# Patient Record
Sex: MALE | Race: WHITE | NOT HISPANIC OR LATINO | Employment: UNEMPLOYED | URBAN - METROPOLITAN AREA
[De-identification: names, ages, dates, MRNs, and addresses within clinical notes are randomized per-mention and may not be internally consistent; named-entity substitution may affect disease eponyms.]

---

## 2019-08-06 ENCOUNTER — HOSPITAL ENCOUNTER (EMERGENCY)
Facility: HOSPITAL | Age: 54
Discharge: HOME | End: 2019-08-07
Attending: EMERGENCY MEDICINE

## 2019-08-06 VITALS
HEIGHT: 72 IN | RESPIRATION RATE: 20 BRPM | TEMPERATURE: 99 F | WEIGHT: 210 LBS | OXYGEN SATURATION: 98 % | BODY MASS INDEX: 28.44 KG/M2 | HEART RATE: 84 BPM | DIASTOLIC BLOOD PRESSURE: 84 MMHG | SYSTOLIC BLOOD PRESSURE: 143 MMHG

## 2019-08-06 DIAGNOSIS — M54.50 LUMBAR BACK PAIN: Primary | ICD-10-CM

## 2019-08-06 DIAGNOSIS — R31.9 HEMATURIA, UNSPECIFIED TYPE: ICD-10-CM

## 2019-08-06 LAB
ALBUMIN SERPL BCP-MCNC: 3.9 G/DL (ref 3.5–5.2)
ALP SERPL-CCNC: 116 U/L (ref 55–135)
ALT SERPL W/O P-5'-P-CCNC: 68 U/L (ref 10–44)
ANION GAP SERPL CALC-SCNC: 9 MMOL/L (ref 8–16)
AST SERPL-CCNC: 34 U/L (ref 10–40)
BACTERIA #/AREA URNS HPF: NORMAL /HPF
BASOPHILS # BLD AUTO: 0.03 K/UL (ref 0–0.2)
BASOPHILS NFR BLD: 0.4 % (ref 0–1.9)
BILIRUB SERPL-MCNC: 0.3 MG/DL (ref 0.1–1)
BILIRUB UR QL STRIP: NEGATIVE
BUN SERPL-MCNC: 11 MG/DL (ref 6–20)
CALCIUM SERPL-MCNC: 9.5 MG/DL (ref 8.7–10.5)
CHLORIDE SERPL-SCNC: 100 MMOL/L (ref 95–110)
CLARITY UR: CLEAR
CO2 SERPL-SCNC: 30 MMOL/L (ref 23–29)
COLOR UR: YELLOW
CREAT SERPL-MCNC: 1.3 MG/DL (ref 0.5–1.4)
DIFFERENTIAL METHOD: ABNORMAL
EOSINOPHIL # BLD AUTO: 0.1 K/UL (ref 0–0.5)
EOSINOPHIL NFR BLD: 1.7 % (ref 0–8)
ERYTHROCYTE [DISTWIDTH] IN BLOOD BY AUTOMATED COUNT: 13 % (ref 11.5–14.5)
EST. GFR  (AFRICAN AMERICAN): >60 ML/MIN/1.73 M^2
EST. GFR  (NON AFRICAN AMERICAN): >60 ML/MIN/1.73 M^2
GLUCOSE SERPL-MCNC: 100 MG/DL (ref 70–110)
GLUCOSE UR QL STRIP: NEGATIVE
HCT VFR BLD AUTO: 45.9 % (ref 40–54)
HCV AB SERPL QL IA: NEGATIVE
HGB BLD-MCNC: 16.2 G/DL (ref 14–18)
HGB UR QL STRIP: ABNORMAL
HIV 1+2 AB+HIV1 P24 AG SERPL QL IA: NEGATIVE
KETONES UR QL STRIP: NEGATIVE
LEUKOCYTE ESTERASE UR QL STRIP: NEGATIVE
LYMPHOCYTES # BLD AUTO: 3.2 K/UL (ref 1–4.8)
LYMPHOCYTES NFR BLD: 39.2 % (ref 18–48)
MCH RBC QN AUTO: 30.6 PG (ref 27–31)
MCHC RBC AUTO-ENTMCNC: 35.3 G/DL (ref 32–36)
MCV RBC AUTO: 87 FL (ref 82–98)
MICROSCOPIC COMMENT: NORMAL
MONOCYTES # BLD AUTO: 0.6 K/UL (ref 0.3–1)
MONOCYTES NFR BLD: 7.7 % (ref 4–15)
NEUTROPHILS # BLD AUTO: 4.2 K/UL (ref 1.8–7.7)
NEUTROPHILS NFR BLD: 51 % (ref 38–73)
NITRITE UR QL STRIP: NEGATIVE
PH UR STRIP: 6 [PH] (ref 5–8)
PLATELET # BLD AUTO: 315 K/UL (ref 150–350)
PMV BLD AUTO: 8.9 FL (ref 9.2–12.9)
POTASSIUM SERPL-SCNC: 3.9 MMOL/L (ref 3.5–5.1)
PROT SERPL-MCNC: 7.4 G/DL (ref 6–8.4)
PROT UR QL STRIP: NEGATIVE
RBC # BLD AUTO: 5.3 M/UL (ref 4.6–6.2)
RBC #/AREA URNS HPF: 3 /HPF (ref 0–4)
SODIUM SERPL-SCNC: 139 MMOL/L (ref 136–145)
SP GR UR STRIP: 1.01 (ref 1–1.03)
URN SPEC COLLECT METH UR: ABNORMAL
UROBILINOGEN UR STRIP-ACNC: NEGATIVE EU/DL
WBC # BLD AUTO: 8.21 K/UL (ref 3.9–12.7)

## 2019-08-06 PROCEDURE — 86703 HIV-1/HIV-2 1 RESULT ANTBDY: CPT

## 2019-08-06 PROCEDURE — 36415 COLL VENOUS BLD VENIPUNCTURE: CPT

## 2019-08-06 PROCEDURE — 85025 COMPLETE CBC W/AUTO DIFF WBC: CPT

## 2019-08-06 PROCEDURE — 96361 HYDRATE IV INFUSION ADD-ON: CPT

## 2019-08-06 PROCEDURE — 81000 URINALYSIS NONAUTO W/SCOPE: CPT

## 2019-08-06 PROCEDURE — 86803 HEPATITIS C AB TEST: CPT

## 2019-08-06 PROCEDURE — 63600175 PHARM REV CODE 636 W HCPCS: Performed by: REGISTERED NURSE

## 2019-08-06 PROCEDURE — 80053 COMPREHEN METABOLIC PANEL: CPT

## 2019-08-06 PROCEDURE — 99284 EMERGENCY DEPT VISIT MOD MDM: CPT | Mod: 25

## 2019-08-06 PROCEDURE — 96374 THER/PROPH/DIAG INJ IV PUSH: CPT

## 2019-08-06 RX ORDER — CYCLOBENZAPRINE HCL 10 MG
10 TABLET ORAL 3 TIMES DAILY PRN
Qty: 15 TABLET | Refills: 0 | Status: SHIPPED | OUTPATIENT
Start: 2019-08-06 | End: 2019-08-11

## 2019-08-06 RX ORDER — DICLOFENAC SODIUM 75 MG/1
75 TABLET, DELAYED RELEASE ORAL 2 TIMES DAILY
Qty: 20 TABLET | Refills: 0 | Status: ON HOLD | OUTPATIENT
Start: 2019-08-06 | End: 2023-06-07 | Stop reason: HOSPADM

## 2019-08-06 RX ORDER — KETOROLAC TROMETHAMINE 30 MG/ML
30 INJECTION, SOLUTION INTRAMUSCULAR; INTRAVENOUS
Status: COMPLETED | OUTPATIENT
Start: 2019-08-06 | End: 2019-08-06

## 2019-08-06 RX ORDER — TRAMADOL HYDROCHLORIDE 50 MG/1
50 TABLET ORAL EVERY 6 HOURS PRN
Qty: 12 TABLET | Refills: 0 | Status: SHIPPED | OUTPATIENT
Start: 2019-08-06

## 2019-08-06 RX ADMIN — SODIUM CHLORIDE 1000 ML: 0.9 INJECTION, SOLUTION INTRAVENOUS at 10:08

## 2019-08-06 RX ADMIN — KETOROLAC TROMETHAMINE 30 MG: 30 INJECTION, SOLUTION INTRAMUSCULAR at 10:08

## 2019-08-07 NOTE — ED PROVIDER NOTES
"SCRIBE #1 NOTE: ISamm, am scribing for, and in the presence of, LEATHA Pierce Jr.. I have scribed the HPI, ROS, and PEx    SCRIBE #2 NOTE: I, Josselin Villeda , am scribing for, and in the presence of,  LEATHA Pierce Jr.. I have scribed the remaining portions of the note not scribed by Scribe #1.      History     Chief Complaint   Patient presents with    Back Pain     left side pain, patient c/o stinging     Review of patient's allergies indicates:   Allergen Reactions    Hydrocodone      "I get flustered and disoriented when I take hydrocodone"         History of Present Illness     HPI    8/6/2019, 9:41 PM  History obtained from the patient      History of Present Illness: Michael Hopper is a 53 y.o. male patient with a PMHx of colitis, CAD and HTN who presents to the Emergency Department for evaluation of left lower back pain which onset gradually 2 days ago. Symptoms are worsening and moderate in severity. Pt states the pain is worse with movement and palpation. No other sxs reported. Patient denies any pain radiation, fever, chills, extremity weakness/numbness, saddle anesthesia, bowel/bladder incontinence, and all other sxs at this time. Pt reports similar pain in the past. Pt makes note he just made it back from [place] after being out of the country for a year. No further complaints or concerns at this time.         Arrival mode: Personal vehicle    PCP: Nkechi Madrid NP        Past Medical History:  Past Medical History:   Diagnosis Date    Coronary artery disease     Hypertension        Past Surgical History:  Past Surgical History:   Procedure Laterality Date    APPENDECTOMY      CARDIAC SURGERY      cardiac stents          Family History:  No family history on file.    Social History:  Social History     Tobacco Use    Smoking status: Current Every Day Smoker   Substance and Sexual Activity    Alcohol use: No    Drug use: Not on file    Sexual activity: Not on " file        Review of Systems     Review of Systems   Constitutional: Negative for chills and fever.   HENT: Negative for sore throat.    Respiratory: Negative for shortness of breath.    Cardiovascular: Negative for chest pain.   Gastrointestinal: Negative for nausea.   Genitourinary: Negative for dysuria.        (-) Urinary or bowel incontinence   Musculoskeletal: Negative for back pain.   Skin: Negative for rash.   Neurological: Negative for weakness and numbness.        (-) saddle anesthesia    Hematological: Does not bruise/bleed easily.   All other systems reviewed and are negative.       Physical Exam     Initial Vitals [08/06/19 2120]   BP Pulse Resp Temp SpO2   (!) 143/84 84 20 98.6 °F (37 °C) 98 %      MAP       --          Physical Exam  Nursing Notes and Vital Signs Reviewed.  Constitutional: Patient is in no apparent distress. Well-developed and well-nourished.  Head: Atraumatic. Normocephalic.  Eyes: PERRL. EOM intact. Conjunctivae are not pale. No scleral icterus.  ENT: Mucous membranes are moist. Oropharynx is clear and symmetric.    Neck: Supple. Full ROM. No lymphadenopathy.  Cardiovascular: Regular rate. Regular rhythm. No murmurs, rubs, or gallops. Distal pulses are 2+ and symmetric.  Pulmonary/Chest: No respiratory distress. Clear to auscultation bilaterally. No wheezing or rales.  Abdominal: Soft and non-distended.  There is no tenderness.  No rebound, guarding, or rigidity. Good bowel sounds.  Genitourinary: No CVA tenderness  Musculoskeletal: Moves all extremities. No obvious deformities. No edema.   Back: lumbar paraspinal tenderness that is worse with movement. No midline bony tenderness, deformities, or step-offs of the T-spine or L-spine. Skin appears normal without abrasions or bruising. No erythema, induration, or fluctuance.   Neurological: Awake and alert. Appropriate for age. negative straight leg raise bilaterally. No strength deficit; equal and 5/5 in bilateral upper and lower  extremities. No light touch sensory deficit. DTRs 2+ and equal. Normal gait. No acute focal neurological deficits noted.  Skin: Warm and dry.  Neurological:  Alert, awake, and appropriate.  Normal speech.  No acute focal neurological deficits are appreciated.  Psychiatric: Normal affect. Good eye contact. Appropriate in content.     ED Course   Procedures  ED Vital Signs:  Vitals:    08/06/19 2120   BP: (!) 143/84   Pulse: 84   Resp: 20   Temp: 98.6 °F (37 °C)   TempSrc: Oral   SpO2: 98%   Weight: 95.3 kg (210 lb)   Height: 6' (1.829 m)       Abnormal Lab Results:  Labs Reviewed   URINALYSIS, REFLEX TO URINE CULTURE - Abnormal; Notable for the following components:       Result Value    Occult Blood UA 2+ (*)     All other components within normal limits    Narrative:     Preferred Collection Type->Urine, Clean Catch   CBC W/ AUTO DIFFERENTIAL - Abnormal; Notable for the following components:    MPV 8.9 (*)     All other components within normal limits   COMPREHENSIVE METABOLIC PANEL - Abnormal; Notable for the following components:    CO2 30 (*)     ALT 68 (*)     All other components within normal limits   HIV 1 / 2 ANTIBODY   HEPATITIS C ANTIBODY   URINALYSIS MICROSCOPIC    Narrative:     Preferred Collection Type->Urine, Clean Catch        All Lab Results:  Results for orders placed or performed during the hospital encounter of 08/06/19   HIV 1/2 Ag/Ab (4th Gen)   Result Value Ref Range    HIV 1/2 Ag/Ab Negative Negative   Hepatitis C antibody   Result Value Ref Range    Hepatitis C Ab Negative    Urinalysis, Reflex to Urine Culture Urine, Clean Catch   Result Value Ref Range    Specimen UA Urine, Clean Catch     Color, UA Yellow Yellow, Straw, Viki    Appearance, UA Clear Clear    pH, UA 6.0 5.0 - 8.0    Specific Gravity, UA 1.010 1.005 - 1.030    Protein, UA Negative Negative    Glucose, UA Negative Negative    Ketones, UA Negative Negative    Bilirubin (UA) Negative Negative    Occult Blood UA 2+ (A)  Negative    Nitrite, UA Negative Negative    Urobilinogen, UA Negative <2.0 EU/dL    Leukocytes, UA Negative Negative   CBC auto differential   Result Value Ref Range    WBC 8.21 3.90 - 12.70 K/uL    RBC 5.30 4.60 - 6.20 M/uL    Hemoglobin 16.2 14.0 - 18.0 g/dL    Hematocrit 45.9 40.0 - 54.0 %    Mean Corpuscular Volume 87 82 - 98 fL    Mean Corpuscular Hemoglobin 30.6 27.0 - 31.0 pg    Mean Corpuscular Hemoglobin Conc 35.3 32.0 - 36.0 g/dL    RDW 13.0 11.5 - 14.5 %    Platelets 315 150 - 350 K/uL    MPV 8.9 (L) 9.2 - 12.9 fL    Gran # (ANC) 4.2 1.8 - 7.7 K/uL    Lymph # 3.2 1.0 - 4.8 K/uL    Mono # 0.6 0.3 - 1.0 K/uL    Eos # 0.1 0.0 - 0.5 K/uL    Baso # 0.03 0.00 - 0.20 K/uL    Gran% 51.0 38.0 - 73.0 %    Lymph% 39.2 18.0 - 48.0 %    Mono% 7.7 4.0 - 15.0 %    Eosinophil% 1.7 0.0 - 8.0 %    Basophil% 0.4 0.0 - 1.9 %    Differential Method Automated    Comprehensive metabolic panel   Result Value Ref Range    Sodium 139 136 - 145 mmol/L    Potassium 3.9 3.5 - 5.1 mmol/L    Chloride 100 95 - 110 mmol/L    CO2 30 (H) 23 - 29 mmol/L    Glucose 100 70 - 110 mg/dL    BUN, Bld 11 6 - 20 mg/dL    Creatinine 1.3 0.5 - 1.4 mg/dL    Calcium 9.5 8.7 - 10.5 mg/dL    Total Protein 7.4 6.0 - 8.4 g/dL    Albumin 3.9 3.5 - 5.2 g/dL    Total Bilirubin 0.3 0.1 - 1.0 mg/dL    Alkaline Phosphatase 116 55 - 135 U/L    AST 34 10 - 40 U/L    ALT 68 (H) 10 - 44 U/L    Anion Gap 9 8 - 16 mmol/L    eGFR if African American >60 >60 mL/min/1.73 m^2    eGFR if non African American >60 >60 mL/min/1.73 m^2   Urinalysis Microscopic   Result Value Ref Range    RBC, UA 3 0 - 4 /hpf    Bacteria Rare None-Occ /hpf    Microscopic Comment SEE COMMENT          Imaging Results:  Imaging Results          CT Renal Stone Study ABD Pelvis WO (Final result)  Result time 08/06/19 23:18:56    Final result by Travis Herrera III, MD (08/06/19 23:18:56)                 Impression:      1.  No kidney stones or obstruction.  No definite evidence of acute  appendicitis.  No bowel obstruction or free air.    2.  Fatty liver.    All CT scans at this facility are performed  using dose modulation techniques as appropriate to performed exam including the following:  automated exposure control; adjustment of mA and/or kV according to the patients size (this includes techniques or standardized protocols for targeted exams where dose is matched to indication/reason for exam: i.e. extremities or head);  iterative reconstruction technique.      Electronically signed by: Travis Herrera  Date:    08/06/2019  Time:    23:18             Narrative:    EXAMINATION:  CT RENAL STONE STUDY ABD PELVIS WO    CLINICAL HISTORY:  Hematuria;    TECHNIQUE:  Axial CT images performed through the abdomen and pelvis without intravenous contrast. Multiplanar reformats were performed and interpreted.    FINDINGS:  The heart size is normal.  Lung bases are grossly clear.  No free intraperitoneal air or bowel obstruction is seen.  Bony windows show degenerative changes but no gross acute abnormality.    Liver is diffusely of low-attenuation consistent with fatty change.  Gallbladder is mildly distended.  Spleen is unremarkable.  Scattered granulomatous calcifications.  There is no adrenal mass or enlargement.  No pancreatic abnormality is seen.  The kidneys are unremarkable.  No abnormality in the right lower quadrant that would suggest acute appendicitis.  Moderate volume of stool in the colon.  Bladder is unremarkable.                                          The Emergency Provider reviewed the vital signs and test results, which are outlined above.     ED Discussion     11:36 PM: Reassessed pt at this time.  Pt states his condition has improved at this time. Discussed with pt all pertinent ED information and results. Discussed pt dx and plan of tx. Gave pt all f/u and return to the ED instructions. All questions and concerns were addressed at this time. Pt expresses understanding of information  and instructions, and is comfortable with plan to discharge. Pt is stable for discharge.    I discussed with patient and/or family/caretaker that evaluation in the ED does not suggest any emergent or life threatening medical conditions requiring immediate intervention beyond what was provided in the ED, and I believe patient is safe for discharge.  Regardless, an unremarkable evaluation in the ED does not preclude the development or presence of a serious of life threatening condition. As such, patient was instructed to return immediately for any worsening or change in current symptoms.          ED Medication(s):  Medications   ketorolac injection 30 mg (30 mg Intravenous Given 8/6/19 2219)   sodium chloride 0.9% bolus 1,000 mL (1,000 mLs Intravenous New Bag 8/6/19 2216)       New Prescriptions    CYCLOBENZAPRINE (FLEXERIL) 10 MG TABLET    Take 1 tablet (10 mg total) by mouth 3 (three) times daily as needed for Muscle spasms.    DICLOFENAC (VOLTAREN) 75 MG EC TABLET    Take 1 tablet (75 mg total) by mouth 2 (two) times daily.    TRAMADOL (ULTRAM) 50 MG TABLET    Take 1 tablet (50 mg total) by mouth every 6 (six) hours as needed for Pain.       Follow-up Information     Nkechi Madrid NP In 3 days.    Specialty:  Family Medicine  Contact information:  [address]  [place] [address] [phone]             [facility]    Specialty:  Emergency Medicine  Why:  If symptoms worsen  Contact information:  [address] [facility] [address] [phone]                                   Scribe Attestation:   Scribe #1: I performed the above scribed service and the documentation accurately describes the services I performed. I attest to the accuracy of the note.     Attending:   Physician Attestation Statement for Scribe #1: I, Ganesh Alan Jr., LEATHA, personally performed the services described in this documentation, as scribed by Samm Song, in my presence, and  it is both accurate and complete.       Scribe Attestation:   Scribe #2: I performed the above scribed service and the documentation accurately describes the services I performed. I attest to the accuracy of the note.    Attending Attestation:           Physician Attestation for Scribe:    Physician Attestation Statement for Scribe #2: I, Ganesh Alan Jr., LEATHA, reviewed documentation, as scribed by Josselin Villeda  in my presence, and it is both accurate and complete. I also acknowledge and confirm the content of the note done by Scribe #1.           Clinical Impression       ICD-10-CM ICD-9-CM   1. Lumbar back pain M54.5 724.2   2. Hematuria, unspecified type R31.9 599.70               Ganesh Alan Jr., ANA ROSA  08/07/19 1524

## 2020-07-05 NOTE — ED NOTES
Patient identifiers verified and correct for Michael VERN Hopper.    Pt to ED c/o L sided flank pain. Pt denies urinary sx.     LOC: The patient is awake, alert and aware of environment with an appropriate affect, the patient is oriented x 3 and speaking appropriately.  Appearance: Pt is resting in stretcher, no acute distress is noted. Clothing and hygiene are appropriate.   Skin: The skin is warm and dry, color consistent with ethnicity, patient has normal skin turgor and moist mucus membranes, skin intact, no breakdown or bruising noted.  Musculoskeletal: Moves all extremities well, full range of motion. No obvious deformities noted.   Respiratory: Airway open and patent. Respiration rate even and unlabored. No use of accessory muscles noted.   Cardiac: Patient has a normal rate, no periphreal edema noted, capillary refill < 3 seconds.  Abdomen: No distension noted. Soft and non-tender to palpation.  Neurologic: Symmetrical expression noted in face. Hand grasps equal. Normal sensation reported in all extremities. No obvious neurological deficits noted.   : WNL except pt c/o L sided flank pain. Denies urinary sx.      60.3

## 2021-10-30 ENCOUNTER — HOSPITAL ENCOUNTER (EMERGENCY)
Facility: HOSPITAL | Age: 56
Discharge: HOME | End: 2021-10-30
Attending: EMERGENCY MEDICINE

## 2021-10-30 VITALS
OXYGEN SATURATION: 99 % | TEMPERATURE: 98 F | SYSTOLIC BLOOD PRESSURE: 190 MMHG | DIASTOLIC BLOOD PRESSURE: 93 MMHG | RESPIRATION RATE: 20 BRPM | HEART RATE: 88 BPM

## 2021-10-30 DIAGNOSIS — K04.7 DENTAL ABSCESS: Primary | ICD-10-CM

## 2021-10-30 PROCEDURE — 99284 EMERGENCY DEPT VISIT MOD MDM: CPT

## 2021-10-30 RX ORDER — PENICILLIN V POTASSIUM 500 MG/1
500 TABLET, FILM COATED ORAL EVERY 6 HOURS
Qty: 28 TABLET | Refills: 0 | Status: ON HOLD | OUTPATIENT
Start: 2021-10-30 | End: 2023-06-07 | Stop reason: HOSPADM

## 2021-10-30 RX ORDER — IBUPROFEN 800 MG/1
800 TABLET ORAL EVERY 6 HOURS PRN
Qty: 30 TABLET | Refills: 0 | Status: ON HOLD | OUTPATIENT
Start: 2021-10-30 | End: 2023-06-07 | Stop reason: HOSPADM

## 2023-06-02 ENCOUNTER — HOSPITAL ENCOUNTER (INPATIENT)
Facility: HOSPITAL | Age: 58
LOS: 6 days | Discharge: HOME | DRG: 246 | End: 2023-06-08
Attending: EMERGENCY MEDICINE | Admitting: INTERNAL MEDICINE
Payer: MEDICAID

## 2023-06-02 DIAGNOSIS — R41.0 CONFUSION: ICD-10-CM

## 2023-06-02 DIAGNOSIS — I42.9 CARDIOMYOPATHY, UNSPECIFIED TYPE: ICD-10-CM

## 2023-06-02 DIAGNOSIS — R94.31 ABNORMAL EKG: ICD-10-CM

## 2023-06-02 DIAGNOSIS — N17.9 AKI (ACUTE KIDNEY INJURY): Primary | ICD-10-CM

## 2023-06-02 DIAGNOSIS — R79.89 ELEVATED TROPONIN: ICD-10-CM

## 2023-06-02 DIAGNOSIS — E86.0 DEHYDRATION: ICD-10-CM

## 2023-06-02 DIAGNOSIS — I21.4 NSTEMI (NON-ST ELEVATED MYOCARDIAL INFARCTION): ICD-10-CM

## 2023-06-02 LAB
ALBUMIN SERPL BCP-MCNC: 4 G/DL (ref 3.5–5.2)
ALP SERPL-CCNC: 76 U/L (ref 55–135)
ALT SERPL W/O P-5'-P-CCNC: 78 U/L (ref 10–44)
ANION GAP SERPL CALC-SCNC: 15 MMOL/L (ref 8–16)
AST SERPL-CCNC: 67 U/L (ref 10–40)
BILIRUB SERPL-MCNC: 2.7 MG/DL (ref 0.1–1)
BUN SERPL-MCNC: 24 MG/DL (ref 6–20)
CALCIUM SERPL-MCNC: 10.5 MG/DL (ref 8.7–10.5)
CHLORIDE SERPL-SCNC: 79 MMOL/L (ref 95–110)
CO2 SERPL-SCNC: 38 MMOL/L (ref 23–29)
CREAT SERPL-MCNC: 1.7 MG/DL (ref 0.5–1.4)
EST. GFR  (NO RACE VARIABLE): 46 ML/MIN/1.73 M^2
GLUCOSE SERPL-MCNC: 121 MG/DL (ref 70–110)
POCT GLUCOSE: 130 MG/DL (ref 70–110)
POTASSIUM SERPL-SCNC: 3 MMOL/L (ref 3.5–5.1)
PROT SERPL-MCNC: 7.7 G/DL (ref 6–8.4)
SODIUM SERPL-SCNC: 132 MMOL/L (ref 136–145)
TROPONIN I SERPL DL<=0.01 NG/ML-MCNC: 0.16 NG/ML (ref 0–0.03)

## 2023-06-02 PROCEDURE — 93010 EKG 12-LEAD: ICD-10-PCS | Mod: ,,, | Performed by: INTERNAL MEDICINE

## 2023-06-02 PROCEDURE — 93005 ELECTROCARDIOGRAM TRACING: CPT

## 2023-06-02 PROCEDURE — 84484 ASSAY OF TROPONIN QUANT: CPT | Performed by: NURSE PRACTITIONER

## 2023-06-02 PROCEDURE — 80053 COMPREHEN METABOLIC PANEL: CPT | Performed by: NURSE PRACTITIONER

## 2023-06-02 PROCEDURE — 82962 GLUCOSE BLOOD TEST: CPT

## 2023-06-02 PROCEDURE — 11000001 HC ACUTE MED/SURG PRIVATE ROOM

## 2023-06-02 PROCEDURE — 83735 ASSAY OF MAGNESIUM: CPT | Performed by: NURSE PRACTITIONER

## 2023-06-02 PROCEDURE — 85025 COMPLETE CBC W/AUTO DIFF WBC: CPT | Performed by: NURSE PRACTITIONER

## 2023-06-02 PROCEDURE — 93010 ELECTROCARDIOGRAM REPORT: CPT | Mod: ,,, | Performed by: INTERNAL MEDICINE

## 2023-06-02 PROCEDURE — 93010 ELECTROCARDIOGRAM REPORT: CPT | Mod: 76,59,, | Performed by: INTERNAL MEDICINE

## 2023-06-02 RX ORDER — MAG HYDROX/ALUMINUM HYD/SIMETH 200-200-20
30 SUSPENSION, ORAL (FINAL DOSE FORM) ORAL ONCE
Status: COMPLETED | OUTPATIENT
Start: 2023-06-03 | End: 2023-06-03

## 2023-06-02 RX ORDER — LIDOCAINE HYDROCHLORIDE 20 MG/ML
15 SOLUTION OROPHARYNGEAL ONCE
Status: DISCONTINUED | OUTPATIENT
Start: 2023-06-03 | End: 2023-06-08 | Stop reason: HOSPADM

## 2023-06-02 RX ORDER — PANTOPRAZOLE SODIUM 40 MG/1
40 TABLET, DELAYED RELEASE ORAL
Status: COMPLETED | OUTPATIENT
Start: 2023-06-02 | End: 2023-06-03

## 2023-06-02 RX ORDER — POTASSIUM CHLORIDE 7.45 MG/ML
10 INJECTION INTRAVENOUS
Status: COMPLETED | OUTPATIENT
Start: 2023-06-03 | End: 2023-06-03

## 2023-06-02 RX ORDER — ASPIRIN 325 MG
325 TABLET ORAL
Status: COMPLETED | OUTPATIENT
Start: 2023-06-03 | End: 2023-06-03

## 2023-06-02 NOTE — Clinical Note
The catheter was inserted into the proximal   left anterior descending. Hemodynamics were performed.

## 2023-06-02 NOTE — Clinical Note
The catheter was inserted into the ostium   right coronary artery. Hemodynamics were performed.  An angiography was performed of the right coronary arteries. Multiple views were taken. The catheter was unable to engage the area..

## 2023-06-02 NOTE — Clinical Note
The catheter was inserted into the ostium   right coronary artery. The catheter was unable to engage the area..

## 2023-06-02 NOTE — Clinical Note
Diagnosis: CONNOR (acute kidney injury) [[id number]]   Future Attending Provider: PHILLIP PAUL [[id number]]   Admitting Provider:: PHILLIP PAUL [[id number]]

## 2023-06-02 NOTE — Clinical Note
The catheter was reinserted into the proximal   left anterior descending. Hemodynamics were performed.

## 2023-06-03 PROBLEM — I50.43 ACUTE ON CHRONIC COMBINED SYSTOLIC AND DIASTOLIC CONGESTIVE HEART FAILURE: Status: ACTIVE | Noted: 2023-06-03

## 2023-06-03 PROBLEM — I51.3 LEFT VENTRICULAR APICAL THROMBUS: Status: ACTIVE | Noted: 2023-06-03

## 2023-06-03 PROBLEM — I21.4 NSTEMI (NON-ST ELEVATED MYOCARDIAL INFARCTION): Status: ACTIVE | Noted: 2023-06-03

## 2023-06-03 LAB
ALBUMIN SERPL BCP-MCNC: 3.1 G/DL (ref 3.5–5.2)
ALBUMIN SERPL BCP-MCNC: 3.3 G/DL (ref 3.5–5.2)
ALP SERPL-CCNC: 50 U/L (ref 55–135)
ALP SERPL-CCNC: 57 U/L (ref 55–135)
ALT SERPL W/O P-5'-P-CCNC: 52 U/L (ref 10–44)
ALT SERPL W/O P-5'-P-CCNC: 54 U/L (ref 10–44)
AMPHET+METHAMPHET UR QL: NEGATIVE
ANION GAP SERPL CALC-SCNC: 12 MMOL/L (ref 8–16)
ANION GAP SERPL CALC-SCNC: 17 MMOL/L (ref 8–16)
AORTIC ROOT ANNULUS: 3.54 CM
APTT PPP: 22.5 SEC (ref 21–32)
APTT PPP: 24.8 SEC (ref 21–32)
APTT PPP: 35.9 SEC (ref 21–32)
ASCENDING AORTA: 3.61 CM
AST SERPL-CCNC: 41 U/L (ref 10–40)
AST SERPL-CCNC: 47 U/L (ref 10–40)
AV INDEX (PROSTH): 0.79
AV MEAN GRADIENT: 8 MMHG
AV PEAK GRADIENT: 15 MMHG
AV VALVE AREA: 3.28 CM2
AV VELOCITY RATIO: 0.7
BACTERIA #/AREA URNS HPF: ABNORMAL /HPF
BARBITURATES UR QL SCN>200 NG/ML: NEGATIVE
BASOPHILS # BLD AUTO: 0.01 K/UL (ref 0–0.2)
BASOPHILS NFR BLD: 0.1 % (ref 0–1.9)
BASOPHILS NFR BLD: 0.2 % (ref 0–1.9)
BASOPHILS NFR BLD: 0.2 % (ref 0–1.9)
BENZODIAZ UR QL SCN>200 NG/ML: NEGATIVE
BILIRUB SERPL-MCNC: 1.8 MG/DL (ref 0.1–1)
BILIRUB SERPL-MCNC: 1.9 MG/DL (ref 0.1–1)
BILIRUB UR QL STRIP: ABNORMAL
BNP SERPL-MCNC: 71 PG/ML (ref 0–99)
BSA FOR ECHO PROCEDURE: 1.96 M2
BUN SERPL-MCNC: 21 MG/DL (ref 6–20)
BUN SERPL-MCNC: 23 MG/DL (ref 6–20)
BZE UR QL SCN: NEGATIVE
CALCIUM SERPL-MCNC: 9.2 MG/DL (ref 8.7–10.5)
CALCIUM SERPL-MCNC: 9.3 MG/DL (ref 8.7–10.5)
CANNABINOIDS UR QL SCN: NEGATIVE
CHLORIDE SERPL-SCNC: 84 MMOL/L (ref 95–110)
CHLORIDE SERPL-SCNC: 88 MMOL/L (ref 95–110)
CLARITY UR: ABNORMAL
CO2 SERPL-SCNC: 29 MMOL/L (ref 23–29)
CO2 SERPL-SCNC: 31 MMOL/L (ref 23–29)
COLOR UR: ABNORMAL
CREAT SERPL-MCNC: 1.1 MG/DL (ref 0.5–1.4)
CREAT SERPL-MCNC: 1.2 MG/DL (ref 0.5–1.4)
CREAT UR-MCNC: 433.9 MG/DL (ref 23–375)
CV ECHO LV RWT: 0.62 CM
DIFFERENTIAL METHOD: ABNORMAL
DOP CALC AO PEAK VEL: 1.94 M/S
DOP CALC AO VTI: 30.9 CM
DOP CALC LVOT AREA: 4.2 CM2
DOP CALC LVOT DIAMETER: 2.3 CM
DOP CALC LVOT PEAK VEL: 1.35 M/S
DOP CALC LVOT STROKE VOLUME: 101.32 CM3
DOP CALC RVOT PEAK VEL: 0.76 M/S
DOP CALC RVOT VTI: 16.1 CM
DOP CALCLVOT PEAK VEL VTI: 24.4 CM
E WAVE DECELERATION TIME: 294.04 MSEC
E/A RATIO: 0.8
E/E' RATIO: 6.73 M/S
ECHO LV POSTERIOR WALL: 1.51 CM (ref 0.6–1.1)
EJECTION FRACTION: 20 %
EOSINOPHIL # BLD AUTO: 0 K/UL (ref 0–0.5)
EOSINOPHIL NFR BLD: 0.1 % (ref 0–8)
EOSINOPHIL NFR BLD: 0.4 % (ref 0–8)
EOSINOPHIL NFR BLD: 0.4 % (ref 0–8)
ERYTHROCYTE [DISTWIDTH] IN BLOOD BY AUTOMATED COUNT: 11.4 % (ref 11.5–14.5)
ERYTHROCYTE [DISTWIDTH] IN BLOOD BY AUTOMATED COUNT: 11.5 % (ref 11.5–14.5)
ERYTHROCYTE [DISTWIDTH] IN BLOOD BY AUTOMATED COUNT: 11.6 % (ref 11.5–14.5)
EST. GFR  (NO RACE VARIABLE): >60 ML/MIN/1.73 M^2
EST. GFR  (NO RACE VARIABLE): >60 ML/MIN/1.73 M^2
FRACTIONAL SHORTENING: 15 % (ref 28–44)
GLUCOSE SERPL-MCNC: 92 MG/DL (ref 70–110)
GLUCOSE SERPL-MCNC: 94 MG/DL (ref 70–110)
GLUCOSE UR QL STRIP: NEGATIVE
HCT VFR BLD AUTO: 33.1 % (ref 40–54)
HCT VFR BLD AUTO: 34.9 % (ref 40–54)
HCT VFR BLD AUTO: 42.2 % (ref 40–54)
HGB BLD-MCNC: 12.6 G/DL (ref 14–18)
HGB BLD-MCNC: 12.8 G/DL (ref 14–18)
HGB BLD-MCNC: 15.8 G/DL (ref 14–18)
HGB UR QL STRIP: ABNORMAL
HYALINE CASTS #/AREA URNS LPF: 60 /LPF
IMM GRANULOCYTES # BLD AUTO: 0.02 K/UL (ref 0–0.04)
IMM GRANULOCYTES # BLD AUTO: 0.02 K/UL (ref 0–0.04)
IMM GRANULOCYTES # BLD AUTO: 0.03 K/UL (ref 0–0.04)
IMM GRANULOCYTES NFR BLD AUTO: 0.4 % (ref 0–0.5)
INR PPP: 1.1 (ref 0.8–1.2)
INTERVENTRICULAR SEPTUM: 1.53 CM (ref 0.6–1.1)
IVC DIAMETER: 1.91 CM
IVRT: 159.85 MSEC
KETONES UR QL STRIP: ABNORMAL
LA MAJOR: 4.29 CM
LA MINOR: 4.3 CM
LA WIDTH: 3.2 CM
LEFT ATRIUM VOLUME INDEX MOD: 17.6 ML/M2
LEFT ATRIUM VOLUME MOD: 34.68 CM3
LEFT INTERNAL DIMENSION IN SYSTOLE: 4.11 CM (ref 2.1–4)
LEFT VENTRICLE DIASTOLIC VOLUME INDEX: 55.85 ML/M2
LEFT VENTRICLE DIASTOLIC VOLUME: 110.03 ML
LEFT VENTRICLE MASS INDEX: 160 G/M2
LEFT VENTRICLE SYSTOLIC VOLUME INDEX: 37.9 ML/M2
LEFT VENTRICLE SYSTOLIC VOLUME: 74.6 ML
LEFT VENTRICULAR INTERNAL DIMENSION IN DIASTOLE: 4.85 CM (ref 3.5–6)
LEFT VENTRICULAR MASS: 314.33 G
LEUKOCYTE ESTERASE UR QL STRIP: NEGATIVE
LIPASE SERPL-CCNC: 73 U/L (ref 4–60)
LV LATERAL E/E' RATIO: 7.4 M/S
LV SEPTAL E/E' RATIO: 6.17 M/S
LVOT MG: 4.25 MMHG
LVOT MV: 0.97 CM/S
LYMPHOCYTES # BLD AUTO: 1 K/UL (ref 1–4.8)
LYMPHOCYTES NFR BLD: 13 % (ref 18–48)
LYMPHOCYTES NFR BLD: 17.5 % (ref 18–48)
LYMPHOCYTES NFR BLD: 18.9 % (ref 18–48)
MAGNESIUM SERPL-MCNC: 2.6 MG/DL (ref 1.6–2.6)
MCH RBC QN AUTO: 34.2 PG (ref 27–31)
MCH RBC QN AUTO: 34.7 PG (ref 27–31)
MCH RBC QN AUTO: 35.3 PG (ref 27–31)
MCHC RBC AUTO-ENTMCNC: 36.7 G/DL (ref 32–36)
MCHC RBC AUTO-ENTMCNC: 37.4 G/DL (ref 32–36)
MCHC RBC AUTO-ENTMCNC: 38.1 G/DL (ref 32–36)
MCV RBC AUTO: 91 FL (ref 82–98)
MCV RBC AUTO: 93 FL (ref 82–98)
MCV RBC AUTO: 94 FL (ref 82–98)
METHADONE UR QL SCN>300 NG/ML: NEGATIVE
MICROSCOPIC COMMENT: ABNORMAL
MONOCYTES # BLD AUTO: 0.5 K/UL (ref 0.3–1)
MONOCYTES # BLD AUTO: 0.5 K/UL (ref 0.3–1)
MONOCYTES # BLD AUTO: 0.7 K/UL (ref 0.3–1)
MONOCYTES NFR BLD: 9.3 % (ref 4–15)
MONOCYTES NFR BLD: 9.3 % (ref 4–15)
MONOCYTES NFR BLD: 9.7 % (ref 4–15)
MV PEAK A VEL: 0.46 M/S
MV PEAK E VEL: 0.37 M/S
NEUTROPHILS # BLD AUTO: 3.8 K/UL (ref 1.8–7.7)
NEUTROPHILS # BLD AUTO: 4.1 K/UL (ref 1.8–7.7)
NEUTROPHILS # BLD AUTO: 5.8 K/UL (ref 1.8–7.7)
NEUTROPHILS NFR BLD: 70.8 % (ref 38–73)
NEUTROPHILS NFR BLD: 72.2 % (ref 38–73)
NEUTROPHILS NFR BLD: 76.7 % (ref 38–73)
NITRITE UR QL STRIP: NEGATIVE
NRBC BLD-RTO: 0 /100 WBC
OPIATES UR QL SCN: NEGATIVE
PCP UR QL SCN>25 NG/ML: NEGATIVE
PH UR STRIP: 5 [PH] (ref 5–8)
PISA TR MAX VEL: 2.05 M/S
PLATELET # BLD AUTO: 156 K/UL (ref 150–450)
PLATELET # BLD AUTO: 162 K/UL (ref 150–450)
PLATELET # BLD AUTO: 208 K/UL (ref 150–450)
PMV BLD AUTO: 9.4 FL (ref 9.2–12.9)
PMV BLD AUTO: 9.4 FL (ref 9.2–12.9)
PMV BLD AUTO: 9.9 FL (ref 9.2–12.9)
POTASSIUM SERPL-SCNC: 2.7 MMOL/L (ref 3.5–5.1)
POTASSIUM SERPL-SCNC: 3 MMOL/L (ref 3.5–5.1)
PROT SERPL-MCNC: 5.9 G/DL (ref 6–8.4)
PROT SERPL-MCNC: 6.4 G/DL (ref 6–8.4)
PROT UR QL STRIP: ABNORMAL
PROTHROMBIN TIME: 11.8 SEC (ref 9–12.5)
PULM VEIN S/D RATIO: 1.81
PV MEAN GRADIENT: 1.21 MMHG
PV MV: 0.77 M/S
PV PEAK D VEL: 0.26 M/S
PV PEAK S VEL: 0.47 M/S
PV PEAK VELOCITY: 1.02 CM/S
RA MAJOR: 4.58 CM
RA PRESSURE: 3 MMHG
RBC # BLD AUTO: 3.63 M/UL (ref 4.6–6.2)
RBC # BLD AUTO: 3.74 M/UL (ref 4.6–6.2)
RBC # BLD AUTO: 4.48 M/UL (ref 4.6–6.2)
RBC #/AREA URNS HPF: 7 /HPF (ref 0–4)
RIGHT VENTRICULAR END-DIASTOLIC DIMENSION: 3.92 CM
RV TISSUE DOPPLER FREE WALL SYSTOLIC VELOCITY 1 (APICAL 4 CHAMBER VIEW): 0.02 CM/S
SODIUM SERPL-SCNC: 130 MMOL/L (ref 136–145)
SODIUM SERPL-SCNC: 131 MMOL/L (ref 136–145)
SP GR UR STRIP: >1.03 (ref 1–1.03)
SQUAMOUS #/AREA URNS HPF: 1 /HPF
STJ: 3.52 CM
TDI LATERAL: 0.05 M/S
TDI SEPTAL: 0.06 M/S
TDI: 0.06 M/S
TOXICOLOGY INFORMATION: ABNORMAL
TR MAX PG: 17 MMHG
TRICUSPID ANNULAR PLANE SYSTOLIC EXCURSION: 2.3 CM
TROPONIN I SERPL DL<=0.01 NG/ML-MCNC: 0.12 NG/ML (ref 0–0.03)
TROPONIN I SERPL DL<=0.01 NG/ML-MCNC: 0.13 NG/ML (ref 0–0.03)
TROPONIN I SERPL DL<=0.01 NG/ML-MCNC: 0.13 NG/ML (ref 0–0.03)
TV REST PULMONARY ARTERY PRESSURE: 20 MMHG
URN SPEC COLLECT METH UR: ABNORMAL
UROBILINOGEN UR STRIP-ACNC: ABNORMAL EU/DL
WBC # BLD AUTO: 5.39 K/UL (ref 3.9–12.7)
WBC # BLD AUTO: 5.6 K/UL (ref 3.9–12.7)
WBC # BLD AUTO: 7.54 K/UL (ref 3.9–12.7)
WBC #/AREA URNS HPF: 8 /HPF (ref 0–5)

## 2023-06-03 PROCEDURE — G0378 HOSPITAL OBSERVATION PER HR: HCPCS

## 2023-06-03 PROCEDURE — 81000 URINALYSIS NONAUTO W/SCOPE: CPT | Mod: 59 | Performed by: NURSE PRACTITIONER

## 2023-06-03 PROCEDURE — 93010 EKG 12-LEAD: ICD-10-PCS | Mod: ,,, | Performed by: INTERNAL MEDICINE

## 2023-06-03 PROCEDURE — 84484 ASSAY OF TROPONIN QUANT: CPT | Performed by: EMERGENCY MEDICINE

## 2023-06-03 PROCEDURE — 83690 ASSAY OF LIPASE: CPT | Performed by: NURSE PRACTITIONER

## 2023-06-03 PROCEDURE — 96361 HYDRATE IV INFUSION ADD-ON: CPT

## 2023-06-03 PROCEDURE — 85730 THROMBOPLASTIN TIME PARTIAL: CPT | Mod: 91 | Performed by: INTERNAL MEDICINE

## 2023-06-03 PROCEDURE — 11000001 HC ACUTE MED/SURG PRIVATE ROOM

## 2023-06-03 PROCEDURE — 25000003 PHARM REV CODE 250: Performed by: EMERGENCY MEDICINE

## 2023-06-03 PROCEDURE — 63600175 PHARM REV CODE 636 W HCPCS: Performed by: EMERGENCY MEDICINE

## 2023-06-03 PROCEDURE — 80053 COMPREHEN METABOLIC PANEL: CPT | Performed by: NURSE PRACTITIONER

## 2023-06-03 PROCEDURE — 25000003 PHARM REV CODE 250: Performed by: INTERNAL MEDICINE

## 2023-06-03 PROCEDURE — 36415 COLL VENOUS BLD VENIPUNCTURE: CPT | Performed by: INTERNAL MEDICINE

## 2023-06-03 PROCEDURE — 99204 OFFICE O/P NEW MOD 45 MIN: CPT | Mod: 25,,, | Performed by: INTERNAL MEDICINE

## 2023-06-03 PROCEDURE — 85025 COMPLETE CBC W/AUTO DIFF WBC: CPT | Mod: 91 | Performed by: INTERNAL MEDICINE

## 2023-06-03 PROCEDURE — 96376 TX/PRO/DX INJ SAME DRUG ADON: CPT

## 2023-06-03 PROCEDURE — 63600175 PHARM REV CODE 636 W HCPCS: Performed by: INTERNAL MEDICINE

## 2023-06-03 PROCEDURE — 80053 COMPREHEN METABOLIC PANEL: CPT | Mod: 91 | Performed by: INTERNAL MEDICINE

## 2023-06-03 PROCEDURE — 96366 THER/PROPH/DIAG IV INF ADDON: CPT

## 2023-06-03 PROCEDURE — 99285 EMERGENCY DEPT VISIT HI MDM: CPT | Mod: 25

## 2023-06-03 PROCEDURE — 36415 COLL VENOUS BLD VENIPUNCTURE: CPT | Performed by: NURSE PRACTITIONER

## 2023-06-03 PROCEDURE — 84484 ASSAY OF TROPONIN QUANT: CPT | Mod: 91 | Performed by: NURSE PRACTITIONER

## 2023-06-03 PROCEDURE — 25000003 PHARM REV CODE 250: Performed by: NURSE PRACTITIONER

## 2023-06-03 PROCEDURE — 80307 DRUG TEST PRSMV CHEM ANLYZR: CPT | Performed by: NURSE PRACTITIONER

## 2023-06-03 PROCEDURE — 85730 THROMBOPLASTIN TIME PARTIAL: CPT | Performed by: INTERNAL MEDICINE

## 2023-06-03 PROCEDURE — 93005 ELECTROCARDIOGRAM TRACING: CPT

## 2023-06-03 PROCEDURE — 63600175 PHARM REV CODE 636 W HCPCS: Performed by: NURSE PRACTITIONER

## 2023-06-03 PROCEDURE — 96375 TX/PRO/DX INJ NEW DRUG ADDON: CPT

## 2023-06-03 PROCEDURE — 85025 COMPLETE CBC W/AUTO DIFF WBC: CPT | Performed by: NURSE PRACTITIONER

## 2023-06-03 PROCEDURE — 99204 PR OFFICE/OUTPT VISIT, NEW, LEVL IV, 45-59 MIN: ICD-10-PCS | Mod: 25,,, | Performed by: INTERNAL MEDICINE

## 2023-06-03 PROCEDURE — 93010 ELECTROCARDIOGRAM REPORT: CPT | Mod: ,,, | Performed by: INTERNAL MEDICINE

## 2023-06-03 PROCEDURE — 96365 THER/PROPH/DIAG IV INF INIT: CPT

## 2023-06-03 PROCEDURE — 25500020 PHARM REV CODE 255: Performed by: INTERNAL MEDICINE

## 2023-06-03 PROCEDURE — 85610 PROTHROMBIN TIME: CPT | Performed by: INTERNAL MEDICINE

## 2023-06-03 PROCEDURE — 83880 ASSAY OF NATRIURETIC PEPTIDE: CPT | Performed by: INTERNAL MEDICINE

## 2023-06-03 RX ORDER — ONDANSETRON 2 MG/ML
8 INJECTION INTRAMUSCULAR; INTRAVENOUS EVERY 6 HOURS PRN
Status: DISCONTINUED | OUTPATIENT
Start: 2023-06-03 | End: 2023-06-08 | Stop reason: HOSPADM

## 2023-06-03 RX ORDER — OXYCODONE AND ACETAMINOPHEN 10; 325 MG/1; MG/1
1 TABLET ORAL EVERY 6 HOURS PRN
Status: DISCONTINUED | OUTPATIENT
Start: 2023-06-03 | End: 2023-06-03

## 2023-06-03 RX ORDER — POTASSIUM CHLORIDE 7.45 MG/ML
10 INJECTION INTRAVENOUS ONCE
Status: COMPLETED | OUTPATIENT
Start: 2023-06-03 | End: 2023-06-03

## 2023-06-03 RX ORDER — HEPARIN SODIUM,PORCINE/D5W 25000/250
0-40 INTRAVENOUS SOLUTION INTRAVENOUS CONTINUOUS
Status: DISCONTINUED | OUTPATIENT
Start: 2023-06-03 | End: 2023-06-06

## 2023-06-03 RX ORDER — ONDANSETRON 2 MG/ML
4 INJECTION INTRAMUSCULAR; INTRAVENOUS
Status: COMPLETED | OUTPATIENT
Start: 2023-06-03 | End: 2023-06-03

## 2023-06-03 RX ORDER — ATORVASTATIN CALCIUM 10 MG/1
10 TABLET, FILM COATED ORAL DAILY
Status: DISCONTINUED | OUTPATIENT
Start: 2023-06-03 | End: 2023-06-06

## 2023-06-03 RX ORDER — POTASSIUM CHLORIDE 20 MEQ/1
40 TABLET, EXTENDED RELEASE ORAL ONCE
Status: COMPLETED | OUTPATIENT
Start: 2023-06-03 | End: 2023-06-03

## 2023-06-03 RX ORDER — METOPROLOL SUCCINATE 25 MG/1
25 TABLET, EXTENDED RELEASE ORAL DAILY
Status: DISCONTINUED | OUTPATIENT
Start: 2023-06-03 | End: 2023-06-04

## 2023-06-03 RX ORDER — CLOPIDOGREL BISULFATE 75 MG/1
75 TABLET ORAL DAILY
Status: DISCONTINUED | OUTPATIENT
Start: 2023-06-03 | End: 2023-06-06

## 2023-06-03 RX ORDER — MAG HYDROX/ALUMINUM HYD/SIMETH 200-200-20
30 SUSPENSION, ORAL (FINAL DOSE FORM) ORAL EVERY 6 HOURS PRN
Status: DISCONTINUED | OUTPATIENT
Start: 2023-06-03 | End: 2023-06-08 | Stop reason: HOSPADM

## 2023-06-03 RX ORDER — SODIUM CHLORIDE 9 MG/ML
INJECTION, SOLUTION INTRAVENOUS CONTINUOUS
Status: DISCONTINUED | OUTPATIENT
Start: 2023-06-03 | End: 2023-06-03

## 2023-06-03 RX ORDER — SODIUM CHLORIDE AND POTASSIUM CHLORIDE 150; 900 MG/100ML; MG/100ML
INJECTION, SOLUTION INTRAVENOUS CONTINUOUS
Status: DISCONTINUED | OUTPATIENT
Start: 2023-06-03 | End: 2023-06-06

## 2023-06-03 RX ORDER — TRAMADOL HYDROCHLORIDE 50 MG/1
50 TABLET ORAL EVERY 6 HOURS PRN
Status: DISCONTINUED | OUTPATIENT
Start: 2023-06-03 | End: 2023-06-08 | Stop reason: HOSPADM

## 2023-06-03 RX ADMIN — ALUMINUM HYDROXIDE, MAGNESIUM HYDROXIDE, AND DIMETHICONE 30 ML: 200; 20; 200 SUSPENSION ORAL at 11:06

## 2023-06-03 RX ADMIN — ATORVASTATIN CALCIUM 10 MG: 10 TABLET, FILM COATED ORAL at 09:06

## 2023-06-03 RX ADMIN — METOPROLOL SUCCINATE 25 MG: 25 TABLET, EXTENDED RELEASE ORAL at 09:06

## 2023-06-03 RX ADMIN — ASPIRIN 325 MG ORAL TABLET 325 MG: 325 PILL ORAL at 12:06

## 2023-06-03 RX ADMIN — POTASSIUM CHLORIDE 10 MEQ: 7.46 INJECTION, SOLUTION INTRAVENOUS at 02:06

## 2023-06-03 RX ADMIN — HUMAN ALBUMIN MICROSPHERES AND PERFLUTREN 0.11 MG: 10; .22 INJECTION, SOLUTION INTRAVENOUS at 09:06

## 2023-06-03 RX ADMIN — TRAMADOL HYDROCHLORIDE 50 MG: 50 TABLET, COATED ORAL at 11:06

## 2023-06-03 RX ADMIN — SODIUM CHLORIDE, POTASSIUM CHLORIDE, SODIUM LACTATE AND CALCIUM CHLORIDE 1000 ML: 600; 310; 30; 20 INJECTION, SOLUTION INTRAVENOUS at 12:06

## 2023-06-03 RX ADMIN — PANTOPRAZOLE SODIUM 40 MG: 40 TABLET, DELAYED RELEASE ORAL at 12:06

## 2023-06-03 RX ADMIN — POTASSIUM CHLORIDE 10 MEQ: 7.46 INJECTION, SOLUTION INTRAVENOUS at 01:06

## 2023-06-03 RX ADMIN — SODIUM CHLORIDE: 9 INJECTION, SOLUTION INTRAVENOUS at 06:06

## 2023-06-03 RX ADMIN — ALUMINUM HYDROXIDE, MAGNESIUM HYDROXIDE, AND DIMETHICONE 30 ML: 200; 20; 200 SUSPENSION ORAL at 12:06

## 2023-06-03 RX ADMIN — HEPARIN SODIUM 12 UNITS/KG/HR: 10000 INJECTION, SOLUTION INTRAVENOUS at 09:06

## 2023-06-03 RX ADMIN — ONDANSETRON 4 MG: 2 INJECTION INTRAMUSCULAR; INTRAVENOUS at 04:06

## 2023-06-03 RX ADMIN — SODIUM CHLORIDE AND POTASSIUM CHLORIDE: 9; 1.49 INJECTION, SOLUTION INTRAVENOUS at 11:06

## 2023-06-03 RX ADMIN — POTASSIUM CHLORIDE 10 MEQ: 7.46 INJECTION, SOLUTION INTRAVENOUS at 12:06

## 2023-06-03 RX ADMIN — POTASSIUM CHLORIDE 40 MEQ: 1500 TABLET, EXTENDED RELEASE ORAL at 01:06

## 2023-06-03 RX ADMIN — CLOPIDOGREL BISULFATE 75 MG: 75 TABLET ORAL at 09:06

## 2023-06-03 NOTE — ASSESSMENT & PLAN NOTE
Not on diuretics now due to n/v  Monitor volume status, BNP ordered   Will start OMT once n/v resolves and renal function stable   Discuss Life[facility] upon DC

## 2023-06-03 NOTE — H&P
"[facility] - Emergency Dept.  [facility] Medicine  History & Physical    Patient Name: Michael Hopper  MRN: [id number]  Patient Class: OP- Observation  Admission Date: 6/2/2023  Attending Physician: Cedric Tovar MD   Primary Care Provider: Nkechi Madrid NP         Patient information was obtained from patient and ER records.     Subjective:     Principal Problem:Serum total bilirubin elevated    Chief Complaint:   Chief Complaint   Patient presents with    Emesis     X 4 days, vomiting multiple times a day, dizzy, reports confusion for 3 days        HPI: Michael Hopper is a 57 year old male with history of CAD and hypertension who presents to ED for further evaluation of nausea and vomiting that began three days ago. He came to the ED due dizziness and lightheadedness upon stand and "cloudy thinking". He admits to some generalized abdominal pain. His last BM was one week ago. He denies fever, chest pain, or palpitations. He denies opioid use.     In ED, CXR negative. Labs revealed elevated liver enzymes and elevated Total Bilirubin. Abdominal ultrasound and lipase are pending. Initial troponin 0.159, trending down 0.131. Case has been discussed with  Cardiology. Patient will be placed in observation under the care of [facility] medicine.       Past Medical History:   Diagnosis Date    Coronary artery disease     Hypertension        Past Surgical History:   Procedure Laterality Date    APPENDECTOMY      CARDIAC SURGERY      cardiac stents        Review of patient's allergies indicates:   Allergen Reactions    Hydrocodone      "I get flustered and disoriented when I take hydrocodone"       No current facility-administered medications on file prior to encounter.     Current Outpatient Medications on File Prior to Encounter   Medication Sig    atorvastatin (LIPITOR) 10 MG tablet Take 10 mg by mouth once daily.    clopidogrel (PLAVIX) 75 mg tablet Take 75 mg by mouth once daily.    ibuprofen (ADVIL,MOTRIN) 800 MG tablet " Take 1 tablet (800 mg total) by mouth every 6 (six) hours as needed for Pain.    metoprolol succinate (TOPROL-XL) 25 MG 24 hr tablet Take 25 mg by mouth once daily.    oxycodone-acetaminophen (PERCOCET)  mg per tablet Take 0.5-1 tablets by mouth every 4 (four) hours as needed for Pain.    diclofenac (VOLTAREN) 75 MG EC tablet Take 1 tablet (75 mg total) by mouth 2 (two) times daily.    penicillin v potassium (VEETID) 500 MG tablet Take 1 tablet (500 mg total) by mouth every 6 (six) hours.    traMADol (ULTRAM) 50 mg tablet Take 1 tablet (50 mg total) by mouth every 6 (six) hours as needed for Pain.     Family History    None       Tobacco Use    Smoking status: Every Day    Smokeless tobacco: Not on file   Substance and Sexual Activity    Alcohol use: No    Drug use: Not on file    Sexual activity: Not on file     Review of Systems   Constitutional:  Negative for chills, diaphoresis, fatigue and fever.   HENT:  Negative for drooling, ear pain, rhinorrhea and sore throat.    Eyes: Negative.    Respiratory:  Negative for cough, shortness of breath and wheezing.    Cardiovascular:  Negative for palpitations and leg swelling.   Gastrointestinal:  Positive for abdominal pain, constipation, nausea and vomiting. Negative for diarrhea.   Endocrine: Negative.    Genitourinary:  Negative for dysuria, hematuria and urgency.   Musculoskeletal: Negative.    Skin:  Negative for color change and wound.   Allergic/Immunologic: Negative.    Neurological:  Negative for dizziness, syncope and speech difficulty.   Hematological: Negative.    Psychiatric/Behavioral: Negative.     Objective:     Vital Signs (Most Recent):  Temp: 99 °F (37.2 °C) (06/03/23 0300)  Pulse: 63 (06/03/23 0300)  Resp: 12 (06/03/23 0300)  BP: 132/60 (06/03/23 0300)  SpO2: 100 % (06/03/23 0300) Vital Signs (24h Range):  Temp:  [98.2 °F (36.8 °C)-99 °F (37.2 °C)] 99 °F (37.2 °C)  Pulse:  [63-98] 63  Resp:  [12-18] 12  SpO2:  [99 %-100 %] 100 %  BP:  (129-132)/(60-86) 132/60     Weight: 76 kg (167 lb 8.8 oz)  Body mass index is 22.72 kg/m².     Physical Exam  Vitals and nursing note reviewed.   Constitutional:       General: He is not in acute distress.     Appearance: He is well-developed.   HENT:      Head: Normocephalic and atraumatic.   Cardiovascular:      Rate and Rhythm: Normal rate and regular rhythm.      Heart sounds: Normal heart sounds.   Pulmonary:      Effort: Pulmonary effort is normal. No respiratory distress.      Breath sounds: Normal breath sounds.   Abdominal:      General: Bowel sounds are normal. There is distension.      Palpations: Abdomen is soft.      Tenderness: There is abdominal tenderness.   Musculoskeletal:         General: Normal range of motion.      Cervical back: Normal range of motion and neck supple. No edema.   Skin:     General: Skin is dry.   Neurological:      Mental Status: He is alert and oriented to person, place, and time.           Significant Labs: All pertinent labs within the past 24 hours have been reviewed.  CBC:   Recent Labs   Lab 06/02/23  2157   WBC 7.54   HGB 15.8   HCT 42.2        CMP:   Recent Labs   Lab 06/02/23  2157   *   K 3.0*   CL 79*   CO2 38*   *   BUN 24*   CREATININE 1.7*   CALCIUM 10.5   PROT 7.7   ALBUMIN 4.0   BILITOT 2.7*   ALKPHOS 76   AST 67*   ALT 78*   ANIONGAP 15       Significant Imaging:   Imaging Results              X-Ray Abdomen Flat And Erect (In process)                      X-Ray Chest AP Portable (Final result)  Result time 06/02/23 22:01:28      Final result by Phill Torres MD (06/02/23 22:01:28)                   Impression:      No acute abnormality.      Electronically signed by: Phill Torres  Date:    06/02/2023  Time:    22:01               Narrative:    EXAMINATION:  XR CHEST AP PORTABLE    CLINICAL HISTORY:  Disorientation, unspecified    TECHNIQUE:  Single frontal view of the chest was performed.    COMPARISON:  None    FINDINGS:  The lungs  are clear, with normal appearance of pulmonary vasculature and no pleural effusion or pneumothorax.    The cardiac silhouette is normal in size. The hilar and mediastinal contours are unremarkable.    Bones are intact.                                        Assessment/Plan:     * Serum total bilirubin elevated  In setting of 3 day history of nausea, vomiting, and food intolerance. AST/ALT are also elevated. Bilirubin in urine noted as well.   --will need to rule out hepatobiliary involvement  --check lipase and abdominal ultrasound now  --NPO for now  --IVFs  --antiemetics    Hypokalemia  Replete  Magnesium wnl      Acute renal failure  Suspected related to IVVD r/t NV  Intravenous hydration   Follow kidney function    Elevated troponin  EKG reviewed by cardiology and unchanged  Suspect demand ischemia r/t vomiting, renal failure, ?GI   Trending down  Check echocardiogram  Cardiology consulted      Vomiting  --see plan for Elevated total Bilirubin  Check KUB; suspect constipation      Hypertension  BP stable. Resume metoprolol      VTE Risk Mitigation (From admission, onward)    None               On 06/03/2023, patient should be placed in hospital observation services under my care in collaboration with Dr. Tovar.      Hank Lopez NP  Department of Hospital Medicine  Formerly [facility] - Emergency Dept.

## 2023-06-03 NOTE — ASSESSMENT & PLAN NOTE
Cont ACS tx - asa, statin, bb, heparin drip  R/LHC Monday with Dr. French pending stable renal function  Reg GI eval for N/V in case pt has PCI needing to remain on DAPT

## 2023-06-03 NOTE — ED NOTES
Pt resting in ED stretcher comfortably, skin warm and dry, RR even and unlabored. JANNETHS. NADN.

## 2023-06-03 NOTE — PLAN OF CARE
Discussed poc with pt, pt verbalized understanding    Purposeful rounding every 2hours    VS wnl  Cardiac monitoring in use, pt is NSR, tele monitor # [id number]  Fall precautions in place, remains injury free  Heparin infusing at 14 units/kg   No sign of active bleeding noted   Accurate I&Os  Bed locked at lowest position  Call light within reach  Chart check complete  Will cont with POC     Problem: Adult Inpatient Plan of Care  Goal: Plan of Care Review  Outcome: Ongoing, Progressing  Goal: Patient-Specific Goal (Individualized)  Outcome: Ongoing, Progressing  Goal: Absence of Hospital-Acquired Illness or Injury  Outcome: Ongoing, Progressing  Goal: Optimal Comfort and Wellbeing  Outcome: Ongoing, Progressing  Goal: Readiness for Transition of Care  Outcome: Ongoing, Progressing     Problem: Fluid and Electrolyte Imbalance (Acute Kidney Injury/Impairment)  Goal: Fluid and Electrolyte Balance  Outcome: Ongoing, Progressing     Problem: Oral Intake Inadequate (Acute Kidney Injury/Impairment)  Goal: Optimal Nutrition Intake  Outcome: Ongoing, Progressing     Problem: Renal Function Impairment (Acute Kidney Injury/Impairment)  Goal: Effective Renal Function  Outcome: Ongoing, Progressing

## 2023-06-03 NOTE — FIRST PROVIDER EVALUATION
Medical screening examination initiated.  I have conducted a focused provider triage encounter, findings are as follows:    Brief history of present illness:  57-year-old male presents to the ER for increased confusion with nausea and vomiting times several days.    Vitals:    06/02/23 2055   BP: 129/86   BP Location: Right arm   Patient Position: Sitting   Pulse: 98   Resp: 18   Temp: 98.2 °F (36.8 °C)   TempSrc: Oral   SpO2: 99%   Weight: 76 kg (167 lb 8.8 oz)   Height: 6' (1.829 m)       Pertinent physical exam:  No acute distress    Brief workup plan:  Labs, imaging, further eval    Preliminary workup initiated; this workup will be continued and followed by the physician or advanced practice provider that is assigned to the patient when roomed.

## 2023-06-03 NOTE — H&P (VIEW-ONLY)
"[facility] - Emergency Dept.  Cardiology  Consult Note    Patient Name: Michael Hopper  MRN: [id number]  Admission Date: 6/2/2023  Hospital Length of Stay: 0 days  Code Status: No Order   Attending Provider: Kelby Gee MD   Consulting Provider: Chicho Singh Md, MD  Primary Care Physician: Nkechi Madrid NP  Principal Problem:Serum total bilirubin elevated    Patient information was obtained from patient, past medical records, ER records and primary team.     Inpatient consult to Cardiology  Consult performed by: Chicho Singh MD  Consult ordered by: Hank Lopez NP  Reason for consult: NSTEMI        Subjective:     Chief Complaint:  N/V abd pain    HPI:   HPI: Michael Hopper is a 57 year old male with history of CAD and hypertension who presents to ED for further evaluation of nausea and vomiting that began three days ago. He came to the ED due dizziness and lightheadedness upon stand and "cloudy thinking". He admits to some generalized abdominal pain. His last BM was one week ago. He denies fever, chest pain, or palpitations. He denies opioid use.      In ED, CXR negative. Labs revealed elevated liver enzymes and elevated Total Bilirubin. Abdominal ultrasound and lipase are pending. Initial troponin 0.159, trending down 0.131. Case has been discussed with  Cardiology. Patient will be placed in observation under the care of hospital medicine.       Cardiology consulted for NSTEMI. ECHO today with severe apical hypokin and possible thrombus; started on heparin drip for ACS. Will need LHC once renal function continues to improve but also needs GI eval for N/V as pt will need to keep down meds post cath if he undergoes PCI. Will likely have R/LHC Monday with Dr. French.       Results for orders placed during the hospital encounter of 06/02/23    Echo    Interpretation Summary  · The left ventricle is mildly enlarged with concentric hypertrophy and severely decreased systolic function.  · The estimated ejection " "fraction is 20%.  · Grade I left ventricular diastolic dysfunction.  · There are segmental left ventricular wall motion abnormalities.  · Normal right ventricular size with normal right ventricular systolic function.  · Normal central venous pressure (3 mmHg).  · The estimated PA systolic pressure is 20 mmHg.  · A small spherical solid left ventricular thrombus is possible. The thrombus is fixed and located in the apex.        Past Medical History:   Diagnosis Date    Coronary artery disease     Hypertension        Past Surgical History:   Procedure Laterality Date    APPENDECTOMY      CARDIAC SURGERY      cardiac stents        Review of patient's allergies indicates:   Allergen Reactions    Hydrocodone      "I get flustered and disoriented when I take hydrocodone"       No current facility-administered medications on file prior to encounter.     Current Outpatient Medications on File Prior to Encounter   Medication Sig    atorvastatin (LIPITOR) 10 MG tablet Take 10 mg by mouth once daily.    clopidogrel (PLAVIX) 75 mg tablet Take 75 mg by mouth once daily.    ibuprofen (ADVIL,MOTRIN) 800 MG tablet Take 1 tablet (800 mg total) by mouth every 6 (six) hours as needed for Pain.    metoprolol succinate (TOPROL-XL) 25 MG 24 hr tablet Take 25 mg by mouth once daily.    oxycodone-acetaminophen (PERCOCET)  mg per tablet Take 0.5-1 tablets by mouth every 4 (four) hours as needed for Pain.    diclofenac (VOLTAREN) 75 MG EC tablet Take 1 tablet (75 mg total) by mouth 2 (two) times daily.    penicillin v potassium (VEETID) 500 MG tablet Take 1 tablet (500 mg total) by mouth every 6 (six) hours.    traMADol (ULTRAM) 50 mg tablet Take 1 tablet (50 mg total) by mouth every 6 (six) hours as needed for Pain.     Family History    None       Tobacco Use    Smoking status: Every Day    Smokeless tobacco: Not on file   Substance and Sexual Activity    Alcohol use: No    Drug use: Not on file    Sexual activity: " Not on file     Review of Systems   Constitutional: Positive for decreased appetite and malaise/fatigue.   HENT: Negative.     Eyes: Negative.    Cardiovascular:  Positive for chest pain, dyspnea on exertion, orthopnea and palpitations.   Respiratory:  Positive for shortness of breath.    Endocrine: Negative.    Skin: Negative.    Musculoskeletal: Negative.    Gastrointestinal:  Positive for abdominal pain, nausea and vomiting.   Genitourinary: Negative.    Neurological: Negative.    Psychiatric/Behavioral: Negative.     Allergic/Immunologic: Negative.    All other systems reviewed and are negative.  Objective:     Vital Signs (Most Recent):  Temp: 98.2 °F (36.8 °C) (06/03/23 0703)  Pulse: (!) 55 (06/03/23 1100)  Resp: 18 (06/03/23 1157)  BP: 129/66 (06/03/23 1100)  SpO2: 98 % (06/03/23 1100) Vital Signs (24h Range):  Temp:  [98.2 °F (36.8 °C)-99 °F (37.2 °C)] 98.2 °F (36.8 °C)  Pulse:  [28-98] 55  Resp:  [12-18] 18  SpO2:  [98 %-100 %] 98 %  BP: (121-132)/(60-86) 129/66     Weight: 75.8 kg (167 lb)  Body mass index is 22.65 kg/m².    SpO2: 98 %         Intake/Output Summary (Last 24 hours) at 6/3/2023 1256  Last data filed at 6/3/2023 0322  Gross per 24 hour   Intake 1158.71 ml   Output --   Net 1158.71 ml       Lines/Drains/Airways       Peripheral Intravenous Line  Duration                  Peripheral IV - Single Lumen 06/02/23 2158 20 G Left Antecubital <1 day         Peripheral IV - Single Lumen 06/03/23 0011 20 G Anterior;Distal;Right Upper Arm <1 day                     Physical Exam  Vitals and nursing note reviewed.   Constitutional:       General: He is in acute distress.      Appearance: He is well-developed. He is ill-appearing. He is not diaphoretic.   HENT:      Head: Normocephalic and atraumatic.      Nose: Nose normal.   Eyes:      General: No scleral icterus.     Conjunctiva/sclera: Conjunctivae normal.   Neck:      Thyroid: No thyromegaly.      Vascular: No JVD.   Cardiovascular:      Rate and  Rhythm: Normal rate and regular rhythm.      Heart sounds: S1 normal and S2 normal. Murmur heard.     No friction rub. No gallop. No S3 or S4 sounds.   Pulmonary:      Effort: Pulmonary effort is normal. No respiratory distress.      Breath sounds: Normal breath sounds. No stridor. No wheezing or rales.   Chest:      Chest wall: No tenderness.   Abdominal:      General: Bowel sounds are normal. There is no distension.      Palpations: Abdomen is soft. There is no mass.      Tenderness: There is no abdominal tenderness. There is no rebound.   Genitourinary:     Comments: Deferred  Musculoskeletal:         General: No tenderness or deformity. Normal range of motion.      Cervical back: Normal range of motion and neck supple.   Lymphadenopathy:      Cervical: No cervical adenopathy.   Skin:     General: Skin is warm and dry.      Coloration: Skin is not pale.      Findings: No erythema or rash.   Neurological:      Mental Status: He is alert and oriented to person, place, and time.      Motor: No abnormal muscle tone.      Coordination: Coordination normal.   Psychiatric:         Behavior: Behavior normal.         Thought Content: Thought content normal.         Judgment: Judgment normal.        Significant Labs: All pertinent lab results from the last 24 hours have been reviewed. and   Recent Lab Results  (Last 5 results in the past 24 hours)        06/03/23  1149   06/03/23  0913   06/03/23  0903   06/03/23  0708   06/03/23  0705        Albumin         3.3       Alkaline Phosphatase         50       ALT         54       Anion Gap         17       Ao root annulus     3.54           Ascending aorta     3.61           Ao peak roberto     1.94           Ao VTI     30.9           aPTT   22.5  Comment: Refer to local heparin nomogram for intensity/dose specific   therapeutic   range.               AST         47       AV valve area     3.28           AV mean gradient     8           AV index (prosthetic)     0.79           AV  peak gradient     15           AV Velocity Ratio     0.70           Baso #   0.01     0.01         Basophil %   0.2     0.2         BILIRUBIN TOTAL         1.9  Comment: For infants and newborns, interpretation of results should be based  on gestational age, weight and in agreement with clinical  observations.    Premature Infant recommended reference ranges:  Up to 24 hours.............<8.0 mg/dL  Up to 48 hours............<12.0 mg/dL  3-5 days..................<15.0 mg/dL  6-29 days.................<15.0 mg/dL         BSA     1.96           BUN         23       Calcium         9.2       Chloride         84       CO2         29       Creatinine         1.2       Left Ventricle Relative Wall Thickness     0.62           Differential Method   Automated     Automated         E/A ratio     0.80           E/E' ratio     6.73           eGFR         >60       EF     20           Eos #   0.0     0.0         Eosinophil %   0.4     0.4         E wave deceleration time     294.04           FS     15           Glucose         94       Gran # (ANC)   4.1     3.8         Gran %   72.2     70.8         Hematocrit   33.1     34.9         Hemoglobin   12.6     12.8         Immature Grans (Abs)   0.02  Comment: Mild elevation in immature granulocytes is non specific and   can be seen in a variety of conditions including stress response,   acute inflammation, trauma and pregnancy. Correlation with other   laboratory and clinical findings is essential.       0.02  Comment: Mild elevation in immature granulocytes is non specific and   can be seen in a variety of conditions including stress response,   acute inflammation, trauma and pregnancy. Correlation with other   laboratory and clinical findings is essential.           Immature Granulocytes   0.4     0.4         INR   1.1  Comment: Coumadin Therapy:  2.0 - 3.0 for INR for all indicators except mechanical heart valves  and antiphospholipid syndromes which should use 2.5 - 3.5.                IVC diameter     1.91           IVRT     159.85           IVSd     1.53           LA WIDTH     3.20           Left Atrium Major Axis     4.29           Left Atrium Minor Axis     4.30           LA volume     34.68           LA Volume Index (Mod)     17.6           LVOT area     4.2           Lipase               LV LATERAL E/E' RATIO     7.40           LV SEPTAL E/E' RATIO     6.17           LV EDV BP     110.03           LV Diastolic Volume Index     55.85           LVIDd     4.85           LVIDs     4.11           LV mass     314.33           LV Mass Index     160           Left Ventricular Outflow Tract Mean Gradient     4.25           Left Ventricular Outflow Tract Mean Velocity     0.97           LVOT diameter     2.30           LVOT peak stanley     1.35           LVOT stroke volume     101.32           LVOT peak VTI     24.40           LV ESV BP     74.60           LV Systolic Volume Index     37.9           Lymph #   1.0     1.0         Lymph %   17.5     18.9         MCH   34.7     34.2         MCHC   38.1     36.7         MCV   91     93         Mean e'     0.06           Mono #   0.5     0.5         Mono %   9.3     9.3         MPV   9.4     9.4         MV Peak A Stanley     0.46           MV Peak E Stanley     0.37           nRBC   0     0         Platelets   156     162         Potassium         3.0       PROTEIN TOTAL         6.4       Protime   11.8             Pulmonary Valve Mean Velocity     0.77           PV mean gradient     1.21           PV Peak D Stanley     0.26           PV Peak S Stanley     0.47           PV PEAK VELOCITY     1.02           Pulm vein S/D ratio     1.81           Posterior Wall     1.51           Right Atrial Pressure (from IVC)     3           RA Major Axis     4.58           RBC   3.63     3.74         RDW   11.5     11.4         RV S'     0.02           RVDD     3.92           RVOT peak stanley     0.76           RVOT peak VTI     16.1           Sodium         130       STJ     3.52            TAPSE     2.30           TDI SEPTAL     0.06           TDI LATERAL     0.05           Triscuspid Valve Regurgitation Peak Gradient     17           TR Max Stanley     2.05           Troponin I 0.119  Comment: The reference interval for Troponin I represents the 99th percentile   cutoff   for our facility and is consistent with 3rd generation assay   performance.                 TV rest pulmonary artery pressure     20           WBC   5.60     5.39                                Significant Imaging: Echocardiogram: Transthoracic echo (TTE) complete (Cupid Only):   Results for orders placed or performed during the hospital encounter of 06/02/23   Echo   Result Value Ref Range    BSA 1.96 m2    TDI SEPTAL 0.06 m/s    LV LATERAL E/E' RATIO 7.40 m/s    LV SEPTAL E/E' RATIO 6.17 m/s    LA WIDTH 3.20 cm    IVC diameter 1.91 cm    Left Ventricular Outflow Tract Mean Velocity 0.97 cm/s    Left Ventricular Outflow Tract Mean Gradient 4.25 mmHg    Pulmonary Valve Mean Velocity 0.77 m/s    TDI LATERAL 0.05 m/s    PV PEAK VELOCITY 1.02 cm/s    LVIDd 4.85 3.5 - 6.0 cm    IVS 1.53 (A) 0.6 - 1.1 cm    Posterior Wall 1.51 (A) 0.6 - 1.1 cm    Ao root annulus 3.54 cm    LVIDs 4.11 (A) 2.1 - 4.0 cm    FS 15 28 - 44 %    STJ 3.52 cm    Ascending aorta 3.61 cm    LV mass 314.33 g    RVDD 3.92 cm    TAPSE 2.30 cm    RV S' 0.02 cm/s    Left Ventricle Relative Wall Thickness 0.62 cm    AV mean gradient 8 mmHg    AV valve area 3.28 cm2    AV Velocity Ratio 0.70     AV index (prosthetic) 0.79     E/A ratio 0.80     Mean e' 0.06 m/s    E wave deceleration time 294.04 msec    IVRT 159.85 msec    Pulm vein S/D ratio 1.81     LVOT diameter 2.30 cm    LVOT area 4.2 cm2    LVOT peak stanley 1.35 m/s    LVOT peak VTI 24.40 cm    Ao peak stanley 1.94 m/s    Ao VTI 30.9 cm    RVOT peak stanley 0.76 m/s    RVOT peak VTI 16.1 cm    LVOT stroke volume 101.32 cm3    AV peak gradient 15 mmHg    PV mean gradient 1.21 mmHg    E/E' ratio 6.73 m/s    MV Peak E Stanley 0.37  m/s    TR Max Stanley 2.05 m/s    MV Peak A Stanley 0.46 m/s    PV Peak S Stanley 0.47 m/s    PV Peak D Stanley 0.26 m/s    LV Systolic Volume 74.60 mL    LV Systolic Volume Index 37.9 mL/m2    LV Diastolic Volume 110.03 mL    LV Diastolic Volume Index 55.85 mL/m2    LV Mass Index 160 g/m2    RA Major Axis 4.58 cm    Left Atrium Minor Axis 4.30 cm    Left Atrium Major Axis 4.29 cm    Triscuspid Valve Regurgitation Peak Gradient 17 mmHg    LA Volume Index (Mod) 17.6 mL/m2    LA volume (mod) 34.68 cm3    Right Atrial Pressure (from IVC) 3 mmHg    EF 20 %    TV rest pulmonary artery pressure 20 mmHg    Narrative    · The left ventricle is mildly enlarged with concentric hypertrophy and   severely decreased systolic function.  · The estimated ejection fraction is 20%.  · Grade I left ventricular diastolic dysfunction.  · There are segmental left ventricular wall motion abnormalities.  · Normal right ventricular size with normal right ventricular systolic   function.  · Normal central venous pressure (3 mmHg).  · The estimated PA systolic pressure is 20 mmHg.  · A small spherical solid left ventricular thrombus is possible. The   thrombus is fixed and located in the apex.        Assessment and Plan:     * Serum total bilirubin elevated  S/p abd u/s - f/u with GI/hepatology     Left ventricular apical thrombus  Cont heparin drip   Rec anticoag upon DC x min 3 months with repeat ECHO     Acute on chronic combined systolic and diastolic congestive heart failure  Not on diuretics now due to n/v  Monitor volume status, BNP ordered   Will start OMT once n/v resolves and renal function stable   Discuss [facility] upon DC      NSTEMI (non-ST elevated myocardial infarction)  Cont ACS tx - asa, statin, bb, heparin drip  R/LHC Monday with Dr. French pending stable renal function  Reg GI eval for N/V in case pt has PCI needing to remain on DAPT    Hypokalemia  Replete K > 4, Mg > 2    Acute renal failure  S/p IVF improving    Elevated troponin  See  plan for NSTEMI    Vomiting  Rec GI eval if no improvement     Hypertension  Titrate meds         VTE Risk Mitigation (From admission, onward)         Ordered     heparin 25,000 units in dextrose 5% (100 units/ml) IV bolus from bag - ADDITIONAL PRN BOLUS - 60 units/kg (max bolus 4000 units)  As needed (PRN)        Question:  Heparin Infusion Adjustment (DO NOT MODIFY ANSWER)  Answer:  [email or web address]\Images\Pharmacy\HeparinInfusions\heparin LOW INTENSITY nomogram for [facility] [id number]    06/03/23 0858     heparin 25,000 units in dextrose 5% (100 units/ml) IV bolus from bag - ADDITIONAL PRN BOLUS - 30 units/kg (max bolus 4000 units)  As needed (PRN)        Question:  Heparin Infusion Adjustment (DO NOT MODIFY ANSWER)  Answer:  [email or web address]\Images\Pharmacy\HeparinInfusions\heparin LOW INTENSITY nomogram for [facility] [id number]    06/03/23 0858     heparin 25,000 units in dextrose 5% 250 mL (100 units/mL) infusion LOW INTENSITY nomogram - [facility]  Continuous        Question Answer Comment   Heparin Infusion Adjustment (DO NOT MODIFY ANSWER) [email or web address]\epic\Images\Pharmacy\HeparinInfusions\heparin LOW INTENSITY nomogram for [facility] [id number]    Begin at (in units/kg/hr) 12        06/03/23 0858                Thank you for your consult. I will follow-up with patient. Please contact us if you have any additional questions.    Chicho Singh Md, MD  Cardiology   [facility] Emergency Dept.

## 2023-06-03 NOTE — HPI
"HPI: Michael Hopper is a 57 year old male with history of CAD and hypertension who presents to ED for further evaluation of nausea and vomiting that began three days ago. He came to the ED due dizziness and lightheadedness upon stand and "cloudy thinking". He admits to some generalized abdominal pain. His last BM was one week ago. He denies fever, chest pain, or palpitations. He denies opioid use.      In ED, CXR negative. Labs revealed elevated liver enzymes and elevated Total Bilirubin. Abdominal ultrasound and lipase are pending. Initial troponin 0.159, trending down 0.131. Case has been discussed with  Cardiology. Patient will be placed in observation under the care of hospital medicine.       Cardiology consulted for NSTEMI. ECHO today with severe apical hypokin and possible thrombus; started on heparin drip for ACS. Will need LHC once renal function continues to improve but also needs GI eval for N/V as pt will need to keep down meds post cath if he undergoes PCI. Will likely have R/LHC Monday with Dr. French.       Results for orders placed during the hospital encounter of 06/02/23    Echo    Interpretation Summary  · The left ventricle is mildly enlarged with concentric hypertrophy and severely decreased systolic function.  · The estimated ejection fraction is 20%.  · Grade I left ventricular diastolic dysfunction.  · There are segmental left ventricular wall motion abnormalities.  · Normal right ventricular size with normal right ventricular systolic function.  · Normal central venous pressure (3 mmHg).  · The estimated PA systolic pressure is 20 mmHg.  · A small spherical solid left ventricular thrombus is possible. The thrombus is fixed and located in the apex.    "

## 2023-06-03 NOTE — SUBJECTIVE & OBJECTIVE
"Past Medical History:   Diagnosis Date    Coronary artery disease     Hypertension        Past Surgical History:   Procedure Laterality Date    APPENDECTOMY      CARDIAC SURGERY      cardiac stents        Review of patient's allergies indicates:   Allergen Reactions    Hydrocodone      "I get flustered and disoriented when I take hydrocodone"       No current facility-administered medications on file prior to encounter.     Current Outpatient Medications on File Prior to Encounter   Medication Sig    atorvastatin (LIPITOR) 10 MG tablet Take 10 mg by mouth once daily.    clopidogrel (PLAVIX) 75 mg tablet Take 75 mg by mouth once daily.    ibuprofen (ADVIL,MOTRIN) 800 MG tablet Take 1 tablet (800 mg total) by mouth every 6 (six) hours as needed for Pain.    metoprolol succinate (TOPROL-XL) 25 MG 24 hr tablet Take 25 mg by mouth once daily.    oxycodone-acetaminophen (PERCOCET)  mg per tablet Take 0.5-1 tablets by mouth every 4 (four) hours as needed for Pain.    diclofenac (VOLTAREN) 75 MG EC tablet Take 1 tablet (75 mg total) by mouth 2 (two) times daily.    penicillin v potassium (VEETID) 500 MG tablet Take 1 tablet (500 mg total) by mouth every 6 (six) hours.    traMADol (ULTRAM) 50 mg tablet Take 1 tablet (50 mg total) by mouth every 6 (six) hours as needed for Pain.     Family History    None       Tobacco Use    Smoking status: Every Day    Smokeless tobacco: Not on file   Substance and Sexual Activity    Alcohol use: No    Drug use: Not on file    Sexual activity: Not on file     Review of Systems   Constitutional:  Negative for chills, diaphoresis, fatigue and fever.   HENT:  Negative for drooling, ear pain, rhinorrhea and sore throat.    Eyes: Negative.    Respiratory:  Negative for cough, shortness of breath and wheezing.    Cardiovascular:  Negative for palpitations and leg swelling.   Gastrointestinal:  Positive for abdominal pain, constipation, nausea and vomiting. Negative for diarrhea. "   Endocrine: Negative.    Genitourinary:  Negative for dysuria, hematuria and urgency.   Musculoskeletal: Negative.    Skin:  Negative for color change and wound.   Allergic/Immunologic: Negative.    Neurological:  Negative for dizziness, syncope and speech difficulty.   Hematological: Negative.    Psychiatric/Behavioral: Negative.     Objective:     Vital Signs (Most Recent):  Temp: 99 °F (37.2 °C) (06/03/23 0300)  Pulse: 63 (06/03/23 0300)  Resp: 12 (06/03/23 0300)  BP: 132/60 (06/03/23 0300)  SpO2: 100 % (06/03/23 0300) Vital Signs (24h Range):  Temp:  [98.2 °F (36.8 °C)-99 °F (37.2 °C)] 99 °F (37.2 °C)  Pulse:  [63-98] 63  Resp:  [12-18] 12  SpO2:  [99 %-100 %] 100 %  BP: (129-132)/(60-86) 132/60     Weight: 76 kg (167 lb 8.8 oz)  Body mass index is 22.72 kg/m².     Physical Exam  Vitals and nursing note reviewed.   Constitutional:       General: He is not in acute distress.     Appearance: He is well-developed.   HENT:      Head: Normocephalic and atraumatic.   Cardiovascular:      Rate and Rhythm: Normal rate and regular rhythm.      Heart sounds: Normal heart sounds.   Pulmonary:      Effort: Pulmonary effort is normal. No respiratory distress.      Breath sounds: Normal breath sounds.   Abdominal:      General: Bowel sounds are normal. There is distension.      Palpations: Abdomen is soft.      Tenderness: There is abdominal tenderness.   Musculoskeletal:         General: Normal range of motion.      Cervical back: Normal range of motion and neck supple. No edema.   Skin:     General: Skin is dry.   Neurological:      Mental Status: He is alert and oriented to person, place, and time.           Significant Labs: All pertinent labs within the past 24 hours have been reviewed.  CBC:   Recent Labs   Lab 06/02/23  2157   WBC 7.54   HGB 15.8   HCT 42.2        CMP:   Recent Labs   Lab 06/02/23  2157   *   K 3.0*   CL 79*   CO2 38*   *   BUN 24*   CREATININE 1.7*   CALCIUM 10.5   PROT 7.7    ALBUMIN 4.0   BILITOT 2.7*   ALKPHOS 76   AST 67*   ALT 78*   ANIONGAP 15       Significant Imaging:   Imaging Results              X-Ray Abdomen Flat And Erect (In process)                      X-Ray Chest AP Portable (Final result)  Result time 06/02/23 22:01:28      Final result by Phill Torres MD (06/02/23 22:01:28)                   Impression:      No acute abnormality.      Electronically signed by: Phill Torres  Date:    06/02/2023  Time:    22:01               Narrative:    EXAMINATION:  XR CHEST AP PORTABLE    CLINICAL HISTORY:  Disorientation, unspecified    TECHNIQUE:  Single frontal view of the chest was performed.    COMPARISON:  None    FINDINGS:  The lungs are clear, with normal appearance of pulmonary vasculature and no pleural effusion or pneumothorax.    The cardiac silhouette is normal in size. The hilar and mediastinal contours are unremarkable.    Bones are intact.

## 2023-06-03 NOTE — SUBJECTIVE & OBJECTIVE
"Past Medical History:   Diagnosis Date    Coronary artery disease     Hypertension        Past Surgical History:   Procedure Laterality Date    APPENDECTOMY      CARDIAC SURGERY      cardiac stents        Review of patient's allergies indicates:   Allergen Reactions    Hydrocodone      "I get flustered and disoriented when I take hydrocodone"       No current facility-administered medications on file prior to encounter.     Current Outpatient Medications on File Prior to Encounter   Medication Sig    atorvastatin (LIPITOR) 10 MG tablet Take 10 mg by mouth once daily.    clopidogrel (PLAVIX) 75 mg tablet Take 75 mg by mouth once daily.    ibuprofen (ADVIL,MOTRIN) 800 MG tablet Take 1 tablet (800 mg total) by mouth every 6 (six) hours as needed for Pain.    metoprolol succinate (TOPROL-XL) 25 MG 24 hr tablet Take 25 mg by mouth once daily.    oxycodone-acetaminophen (PERCOCET)  mg per tablet Take 0.5-1 tablets by mouth every 4 (four) hours as needed for Pain.    diclofenac (VOLTAREN) 75 MG EC tablet Take 1 tablet (75 mg total) by mouth 2 (two) times daily.    penicillin v potassium (VEETID) 500 MG tablet Take 1 tablet (500 mg total) by mouth every 6 (six) hours.    traMADol (ULTRAM) 50 mg tablet Take 1 tablet (50 mg total) by mouth every 6 (six) hours as needed for Pain.     Family History    None       Tobacco Use    Smoking status: Every Day    Smokeless tobacco: Not on file   Substance and Sexual Activity    Alcohol use: No    Drug use: Not on file    Sexual activity: Not on file     Review of Systems   Constitutional: Positive for decreased appetite and malaise/fatigue.   HENT: Negative.     Eyes: Negative.    Cardiovascular:  Positive for chest pain, dyspnea on exertion, orthopnea and palpitations.   Respiratory:  Positive for shortness of breath.    Endocrine: Negative.    Skin: Negative.    Musculoskeletal: Negative.    Gastrointestinal:  Positive for abdominal pain, nausea and vomiting.   Genitourinary: " Negative.    Neurological: Negative.    Psychiatric/Behavioral: Negative.     Allergic/Immunologic: Negative.    All other systems reviewed and are negative.  Objective:     Vital Signs (Most Recent):  Temp: 98.2 °F (36.8 °C) (06/03/23 0703)  Pulse: (!) 55 (06/03/23 1100)  Resp: 18 (06/03/23 1157)  BP: 129/66 (06/03/23 1100)  SpO2: 98 % (06/03/23 1100) Vital Signs (24h Range):  Temp:  [98.2 °F (36.8 °C)-99 °F (37.2 °C)] 98.2 °F (36.8 °C)  Pulse:  [28-98] 55  Resp:  [12-18] 18  SpO2:  [98 %-100 %] 98 %  BP: (121-132)/(60-86) 129/66     Weight: 75.8 kg (167 lb)  Body mass index is 22.65 kg/m².    SpO2: 98 %         Intake/Output Summary (Last 24 hours) at 6/3/2023 1256  Last data filed at 6/3/2023 0322  Gross per 24 hour   Intake 1158.71 ml   Output --   Net 1158.71 ml       Lines/Drains/Airways       Peripheral Intravenous Line  Duration                  Peripheral IV - Single Lumen 06/02/23 2158 20 G Left Antecubital <1 day         Peripheral IV - Single Lumen 06/03/23 0011 20 G Anterior;Distal;Right Upper Arm <1 day                     Physical Exam  Vitals and nursing note reviewed.   Constitutional:       General: He is in acute distress.      Appearance: He is well-developed. He is ill-appearing. He is not diaphoretic.   HENT:      Head: Normocephalic and atraumatic.      Nose: Nose normal.   Eyes:      General: No scleral icterus.     Conjunctiva/sclera: Conjunctivae normal.   Neck:      Thyroid: No thyromegaly.      Vascular: No JVD.   Cardiovascular:      Rate and Rhythm: Normal rate and regular rhythm.      Heart sounds: S1 normal and S2 normal. Murmur heard.     No friction rub. No gallop. No S3 or S4 sounds.   Pulmonary:      Effort: Pulmonary effort is normal. No respiratory distress.      Breath sounds: Normal breath sounds. No stridor. No wheezing or rales.   Chest:      Chest wall: No tenderness.   Abdominal:      General: Bowel sounds are normal. There is no distension.      Palpations: Abdomen is  soft. There is no mass.      Tenderness: There is no abdominal tenderness. There is no rebound.   Genitourinary:     Comments: Deferred  Musculoskeletal:         General: No tenderness or deformity. Normal range of motion.      Cervical back: Normal range of motion and neck supple.   Lymphadenopathy:      Cervical: No cervical adenopathy.   Skin:     General: Skin is warm and dry.      Coloration: Skin is not pale.      Findings: No erythema or rash.   Neurological:      Mental Status: He is alert and oriented to person, place, and time.      Motor: No abnormal muscle tone.      Coordination: Coordination normal.   Psychiatric:         Behavior: Behavior normal.         Thought Content: Thought content normal.         Judgment: Judgment normal.        Significant Labs: All pertinent lab results from the last 24 hours have been reviewed. and   Recent Lab Results  (Last 5 results in the past 24 hours)        06/03/23  1149   06/03/23  0913   06/03/23  0903   06/03/23  0708   06/03/23  0705        Albumin         3.3       Alkaline Phosphatase         50       ALT         54       Anion Gap         17       Ao root annulus     3.54           Ascending aorta     3.61           Ao peak roberto     1.94           Ao VTI     30.9           aPTT   22.5  Comment: Refer to local heparin nomogram for intensity/dose specific   therapeutic   range.               AST         47       AV valve area     3.28           AV mean gradient     8           AV index (prosthetic)     0.79           AV peak gradient     15           AV Velocity Ratio     0.70           Baso #   0.01     0.01         Basophil %   0.2     0.2         BILIRUBIN TOTAL         1.9  Comment: For infants and newborns, interpretation of results should be based  on gestational age, weight and in agreement with clinical  observations.    Premature Infant recommended reference ranges:  Up to 24 hours.............<8.0 mg/dL  Up to 48 hours............<12.0 mg/dL  3-5  days..................<15.0 mg/dL  6-29 days.................<15.0 mg/dL         BSA     1.96           BUN         23       Calcium         9.2       Chloride         84       CO2         29       Creatinine         1.2       Left Ventricle Relative Wall Thickness     0.62           Differential Method   Automated     Automated         E/A ratio     0.80           E/E' ratio     6.73           eGFR         >60       EF     20           Eos #   0.0     0.0         Eosinophil %   0.4     0.4         E wave deceleration time     294.04           FS     15           Glucose         94       Gran # (ANC)   4.1     3.8         Gran %   72.2     70.8         Hematocrit   33.1     34.9         Hemoglobin   12.6     12.8         Immature Grans (Abs)   0.02  Comment: Mild elevation in immature granulocytes is non specific and   can be seen in a variety of conditions including stress response,   acute inflammation, trauma and pregnancy. Correlation with other   laboratory and clinical findings is essential.       0.02  Comment: Mild elevation in immature granulocytes is non specific and   can be seen in a variety of conditions including stress response,   acute inflammation, trauma and pregnancy. Correlation with other   laboratory and clinical findings is essential.           Immature Granulocytes   0.4     0.4         INR   1.1  Comment: Coumadin Therapy:  2.0 - 3.0 for INR for all indicators except mechanical heart valves  and antiphospholipid syndromes which should use 2.5 - 3.5.               IVC diameter     1.91           IVRT     159.85           IVSd     1.53           LA WIDTH     3.20           Left Atrium Major Axis     4.29           Left Atrium Minor Axis     4.30           LA volume     34.68           LA Volume Index (Mod)     17.6           LVOT area     4.2           Lipase               LV LATERAL E/E' RATIO     7.40           LV SEPTAL E/E' RATIO     6.17           LV EDV BP     110.03           LV  Diastolic Volume Index     55.85           LVIDd     4.85           LVIDs     4.11           LV mass     314.33           LV Mass Index     160           Left Ventricular Outflow Tract Mean Gradient     4.25           Left Ventricular Outflow Tract Mean Velocity     0.97           LVOT diameter     2.30           LVOT peak stanley     1.35           LVOT stroke volume     101.32           LVOT peak VTI     24.40           LV ESV BP     74.60           LV Systolic Volume Index     37.9           Lymph #   1.0     1.0         Lymph %   17.5     18.9         MCH   34.7     34.2         MCHC   38.1     36.7         MCV   91     93         Mean e'     0.06           Mono #   0.5     0.5         Mono %   9.3     9.3         MPV   9.4     9.4         MV Peak A Stanley     0.46           MV Peak E Stanley     0.37           nRBC   0     0         Platelets   156     162         Potassium         3.0       PROTEIN TOTAL         6.4       Protime   11.8             Pulmonary Valve Mean Velocity     0.77           PV mean gradient     1.21           PV Peak D Stanley     0.26           PV Peak S Stanley     0.47           PV PEAK VELOCITY     1.02           Pulm vein S/D ratio     1.81           Posterior Wall     1.51           Right Atrial Pressure (from IVC)     3           RA Major Axis     4.58           RBC   3.63     3.74         RDW   11.5     11.4         RV S'     0.02           RVDD     3.92           RVOT peak stanley     0.76           RVOT peak VTI     16.1           Sodium         130       STJ     3.52           TAPSE     2.30           TDI SEPTAL     0.06           TDI LATERAL     0.05           Triscuspid Valve Regurgitation Peak Gradient     17           TR Max Stanley     2.05           Troponin I 0.119  Comment: The reference interval for Troponin I represents the 99th percentile   cutoff   for our facility and is consistent with 3rd generation assay   performance.                 TV rest pulmonary artery pressure     20            WBC   5.60     5.39                                Significant Imaging: Echocardiogram: Transthoracic echo (TTE) complete (Cupid Only):   Results for orders placed or performed during the hospital encounter of 06/02/23   Echo   Result Value Ref Range    BSA 1.96 m2    TDI SEPTAL 0.06 m/s    LV LATERAL E/E' RATIO 7.40 m/s    LV SEPTAL E/E' RATIO 6.17 m/s    LA WIDTH 3.20 cm    IVC diameter 1.91 cm    Left Ventricular Outflow Tract Mean Velocity 0.97 cm/s    Left Ventricular Outflow Tract Mean Gradient 4.25 mmHg    Pulmonary Valve Mean Velocity 0.77 m/s    TDI LATERAL 0.05 m/s    PV PEAK VELOCITY 1.02 cm/s    LVIDd 4.85 3.5 - 6.0 cm    IVS 1.53 (A) 0.6 - 1.1 cm    Posterior Wall 1.51 (A) 0.6 - 1.1 cm    Ao root annulus 3.54 cm    LVIDs 4.11 (A) 2.1 - 4.0 cm    FS 15 28 - 44 %    STJ 3.52 cm    Ascending aorta 3.61 cm    LV mass 314.33 g    RVDD 3.92 cm    TAPSE 2.30 cm    RV S' 0.02 cm/s    Left Ventricle Relative Wall Thickness 0.62 cm    AV mean gradient 8 mmHg    AV valve area 3.28 cm2    AV Velocity Ratio 0.70     AV index (prosthetic) 0.79     E/A ratio 0.80     Mean e' 0.06 m/s    E wave deceleration time 294.04 msec    IVRT 159.85 msec    Pulm vein S/D ratio 1.81     LVOT diameter 2.30 cm    LVOT area 4.2 cm2    LVOT peak stanley 1.35 m/s    LVOT peak VTI 24.40 cm    Ao peak stanley 1.94 m/s    Ao VTI 30.9 cm    RVOT peak stanley 0.76 m/s    RVOT peak VTI 16.1 cm    LVOT stroke volume 101.32 cm3    AV peak gradient 15 mmHg    PV mean gradient 1.21 mmHg    E/E' ratio 6.73 m/s    MV Peak E Stanley 0.37 m/s    TR Max Stanley 2.05 m/s    MV Peak A Stanley 0.46 m/s    PV Peak S Stanley 0.47 m/s    PV Peak D Stanley 0.26 m/s    LV Systolic Volume 74.60 mL    LV Systolic Volume Index 37.9 mL/m2    LV Diastolic Volume 110.03 mL    LV Diastolic Volume Index 55.85 mL/m2    LV Mass Index 160 g/m2    RA Major Axis 4.58 cm    Left Atrium Minor Axis 4.30 cm    Left Atrium Major Axis 4.29 cm    Triscuspid Valve Regurgitation Peak Gradient 17 mmHg    LA  Volume Index (Mod) 17.6 mL/m2    LA volume (mod) 34.68 cm3    Right Atrial Pressure (from IVC) 3 mmHg    EF 20 %    TV rest pulmonary artery pressure 20 mmHg    Narrative    · The left ventricle is mildly enlarged with concentric hypertrophy and   severely decreased systolic function.  · The estimated ejection fraction is 20%.  · Grade I left ventricular diastolic dysfunction.  · There are segmental left ventricular wall motion abnormalities.  · Normal right ventricular size with normal right ventricular systolic   function.  · Normal central venous pressure (3 mmHg).  · The estimated PA systolic pressure is 20 mmHg.  · A small spherical solid left ventricular thrombus is possible. The   thrombus is fixed and located in the apex.

## 2023-06-03 NOTE — ASSESSMENT & PLAN NOTE
In setting of 3 day history of nausea, vomiting, and food intolerance. AST/ALT are also elevated. Bilirubin in urine noted as well.   --will need to rule out hepatobiliary involvement  --check lipase and abdominal ultrasound now  --NPO for now  --IVFs  --antiemetics

## 2023-06-03 NOTE — ASSESSMENT & PLAN NOTE
EKG reviewed by cardiology and unchanged  Suspect demand ischemia r/t vomiting, renal failure, ?GI   Trending down  Check echocardiogram  Cardiology consulted

## 2023-06-03 NOTE — CONSULTS
"[facility]  Cardiology  Consult Note    Patient Name: Michael Hopper  MRN: [id number]  Admission Date: 6/2/2023  Hospital Length of Stay: 0 days  Code Status: No Order   Attending Provider: Kelby Gee MD   Consulting Provider: Chicho Singh Md, MD  Primary Care Physician: Nkechi Madrid NP  Principal Problem:Serum total bilirubin elevated    Patient information was obtained from patient, past medical records, ER records and primary team.     Inpatient consult to Cardiology  Consult performed by: Chicho Singh MD  Consult ordered by: Hank Lopez NP  Reason for consult: NSTEMI        Subjective:     Chief Complaint:  N/V abd pain    HPI:   HPI: Michael Hopper is a 57 year old male with history of CAD and hypertension who presents to ED for further evaluation of nausea and vomiting that began three days ago. He came to the ED due dizziness and lightheadedness upon stand and "cloudy thinking". He admits to some generalized abdominal pain. His last BM was one week ago. He denies fever, chest pain, or palpitations. He denies opioid use.      In ED, CXR negative. Labs revealed elevated liver enzymes and elevated Total Bilirubin. Abdominal ultrasound and lipase are pending. Initial troponin 0.159, trending down 0.131. Case has been discussed with  Cardiology. Patient will be placed in observation under the care of hospital medicine.       Cardiology consulted for NSTEMI. ECHO today with severe apical hypokin and possible thrombus; started on heparin drip for ACS. Will need LHC once renal function continues to improve but also needs GI eval for N/V as pt will need to keep down meds post cath if he undergoes PCI. Will likely have R/LHC Monday with Dr. French.       Results for orders placed during the hospital encounter of 06/02/23    Echo    Interpretation Summary  · The left ventricle is mildly enlarged with concentric hypertrophy and severely decreased systolic function.  · The estimated ejection " "fraction is 20%.  · Grade I left ventricular diastolic dysfunction.  · There are segmental left ventricular wall motion abnormalities.  · Normal right ventricular size with normal right ventricular systolic function.  · Normal central venous pressure (3 mmHg).  · The estimated PA systolic pressure is 20 mmHg.  · A small spherical solid left ventricular thrombus is possible. The thrombus is fixed and located in the apex.        Past Medical History:   Diagnosis Date    Coronary artery disease     Hypertension        Past Surgical History:   Procedure Laterality Date    APPENDECTOMY      CARDIAC SURGERY      cardiac stents        Review of patient's allergies indicates:   Allergen Reactions    Hydrocodone      "I get flustered and disoriented when I take hydrocodone"       No current facility-administered medications on file prior to encounter.     Current Outpatient Medications on File Prior to Encounter   Medication Sig    atorvastatin (LIPITOR) 10 MG tablet Take 10 mg by mouth once daily.    clopidogrel (PLAVIX) 75 mg tablet Take 75 mg by mouth once daily.    ibuprofen (ADVIL,MOTRIN) 800 MG tablet Take 1 tablet (800 mg total) by mouth every 6 (six) hours as needed for Pain.    metoprolol succinate (TOPROL-XL) 25 MG 24 hr tablet Take 25 mg by mouth once daily.    oxycodone-acetaminophen (PERCOCET)  mg per tablet Take 0.5-1 tablets by mouth every 4 (four) hours as needed for Pain.    diclofenac (VOLTAREN) 75 MG EC tablet Take 1 tablet (75 mg total) by mouth 2 (two) times daily.    penicillin v potassium (VEETID) 500 MG tablet Take 1 tablet (500 mg total) by mouth every 6 (six) hours.    traMADol (ULTRAM) 50 mg tablet Take 1 tablet (50 mg total) by mouth every 6 (six) hours as needed for Pain.     Family History    None       Tobacco Use    Smoking status: Every Day    Smokeless tobacco: Not on file   Substance and Sexual Activity    Alcohol use: No    Drug use: Not on file    Sexual activity: " Not on file     Review of Systems   Constitutional: Positive for decreased appetite and malaise/fatigue.   HENT: Negative.     Eyes: Negative.    Cardiovascular:  Positive for chest pain, dyspnea on exertion, orthopnea and palpitations.   Respiratory:  Positive for shortness of breath.    Endocrine: Negative.    Skin: Negative.    Musculoskeletal: Negative.    Gastrointestinal:  Positive for abdominal pain, nausea and vomiting.   Genitourinary: Negative.    Neurological: Negative.    Psychiatric/Behavioral: Negative.     Allergic/Immunologic: Negative.    All other systems reviewed and are negative.  Objective:     Vital Signs (Most Recent):  Temp: 98.2 °F (36.8 °C) (06/03/23 0703)  Pulse: (!) 55 (06/03/23 1100)  Resp: 18 (06/03/23 1157)  BP: 129/66 (06/03/23 1100)  SpO2: 98 % (06/03/23 1100) Vital Signs (24h Range):  Temp:  [98.2 °F (36.8 °C)-99 °F (37.2 °C)] 98.2 °F (36.8 °C)  Pulse:  [28-98] 55  Resp:  [12-18] 18  SpO2:  [98 %-100 %] 98 %  BP: (121-132)/(60-86) 129/66     Weight: 75.8 kg (167 lb)  Body mass index is 22.65 kg/m².    SpO2: 98 %         Intake/Output Summary (Last 24 hours) at 6/3/2023 1256  Last data filed at 6/3/2023 0322  Gross per 24 hour   Intake 1158.71 ml   Output --   Net 1158.71 ml       Lines/Drains/Airways       Peripheral Intravenous Line  Duration                  Peripheral IV - Single Lumen 06/02/23 2158 20 G Left Antecubital <1 day         Peripheral IV - Single Lumen 06/03/23 0011 20 G Anterior;Distal;Right Upper Arm <1 day                     Physical Exam  Vitals and nursing note reviewed.   Constitutional:       General: He is in acute distress.      Appearance: He is well-developed. He is ill-appearing. He is not diaphoretic.   HENT:      Head: Normocephalic and atraumatic.      Nose: Nose normal.   Eyes:      General: No scleral icterus.     Conjunctiva/sclera: Conjunctivae normal.   Neck:      Thyroid: No thyromegaly.      Vascular: No JVD.   Cardiovascular:      Rate and  Rhythm: Normal rate and regular rhythm.      Heart sounds: S1 normal and S2 normal. Murmur heard.     No friction rub. No gallop. No S3 or S4 sounds.   Pulmonary:      Effort: Pulmonary effort is normal. No respiratory distress.      Breath sounds: Normal breath sounds. No stridor. No wheezing or rales.   Chest:      Chest wall: No tenderness.   Abdominal:      General: Bowel sounds are normal. There is no distension.      Palpations: Abdomen is soft. There is no mass.      Tenderness: There is no abdominal tenderness. There is no rebound.   Genitourinary:     Comments: Deferred  Musculoskeletal:         General: No tenderness or deformity. Normal range of motion.      Cervical back: Normal range of motion and neck supple.   Lymphadenopathy:      Cervical: No cervical adenopathy.   Skin:     General: Skin is warm and dry.      Coloration: Skin is not pale.      Findings: No erythema or rash.   Neurological:      Mental Status: He is alert and oriented to person, place, and time.      Motor: No abnormal muscle tone.      Coordination: Coordination normal.   Psychiatric:         Behavior: Behavior normal.         Thought Content: Thought content normal.         Judgment: Judgment normal.        Significant Labs: All pertinent lab results from the last 24 hours have been reviewed. and   Recent Lab Results  (Last 5 results in the past 24 hours)        06/03/23  1149   06/03/23  0913   06/03/23  0903   06/03/23  0708   06/03/23  0705        Albumin         3.3       Alkaline Phosphatase         50       ALT         54       Anion Gap         17       Ao root annulus     3.54           Ascending aorta     3.61           Ao peak roberto     1.94           Ao VTI     30.9           aPTT   22.5  Comment: Refer to local heparin nomogram for intensity/dose specific   therapeutic   range.               AST         47       AV valve area     3.28           AV mean gradient     8           AV index (prosthetic)     0.79           AV  peak gradient     15           AV Velocity Ratio     0.70           Baso #   0.01     0.01         Basophil %   0.2     0.2         BILIRUBIN TOTAL         1.9  Comment: For infants and newborns, interpretation of results should be based  on gestational age, weight and in agreement with clinical  observations.    Premature Infant recommended reference ranges:  Up to 24 hours.............<8.0 mg/dL  Up to 48 hours............<12.0 mg/dL  3-5 days..................<15.0 mg/dL  6-29 days.................<15.0 mg/dL         BSA     1.96           BUN         23       Calcium         9.2       Chloride         84       CO2         29       Creatinine         1.2       Left Ventricle Relative Wall Thickness     0.62           Differential Method   Automated     Automated         E/A ratio     0.80           E/E' ratio     6.73           eGFR         >60       EF     20           Eos #   0.0     0.0         Eosinophil %   0.4     0.4         E wave deceleration time     294.04           FS     15           Glucose         94       Gran # (ANC)   4.1     3.8         Gran %   72.2     70.8         Hematocrit   33.1     34.9         Hemoglobin   12.6     12.8         Immature Grans (Abs)   0.02  Comment: Mild elevation in immature granulocytes is non specific and   can be seen in a variety of conditions including stress response,   acute inflammation, trauma and pregnancy. Correlation with other   laboratory and clinical findings is essential.       0.02  Comment: Mild elevation in immature granulocytes is non specific and   can be seen in a variety of conditions including stress response,   acute inflammation, trauma and pregnancy. Correlation with other   laboratory and clinical findings is essential.           Immature Granulocytes   0.4     0.4         INR   1.1  Comment: Coumadin Therapy:  2.0 - 3.0 for INR for all indicators except mechanical heart valves  and antiphospholipid syndromes which should use 2.5 - 3.5.                IVC diameter     1.91           IVRT     159.85           IVSd     1.53           LA WIDTH     3.20           Left Atrium Major Axis     4.29           Left Atrium Minor Axis     4.30           LA volume     34.68           LA Volume Index (Mod)     17.6           LVOT area     4.2           Lipase               LV LATERAL E/E' RATIO     7.40           LV SEPTAL E/E' RATIO     6.17           LV EDV BP     110.03           LV Diastolic Volume Index     55.85           LVIDd     4.85           LVIDs     4.11           LV mass     314.33           LV Mass Index     160           Left Ventricular Outflow Tract Mean Gradient     4.25           Left Ventricular Outflow Tract Mean Velocity     0.97           LVOT diameter     2.30           LVOT peak stanley     1.35           LVOT stroke volume     101.32           LVOT peak VTI     24.40           LV ESV BP     74.60           LV Systolic Volume Index     37.9           Lymph #   1.0     1.0         Lymph %   17.5     18.9         MCH   34.7     34.2         MCHC   38.1     36.7         MCV   91     93         Mean e'     0.06           Mono #   0.5     0.5         Mono %   9.3     9.3         MPV   9.4     9.4         MV Peak A Stanley     0.46           MV Peak E Stanley     0.37           nRBC   0     0         Platelets   156     162         Potassium         3.0       PROTEIN TOTAL         6.4       Protime   11.8             Pulmonary Valve Mean Velocity     0.77           PV mean gradient     1.21           PV Peak D Stanley     0.26           PV Peak S Stanley     0.47           PV PEAK VELOCITY     1.02           Pulm vein S/D ratio     1.81           Posterior Wall     1.51           Right Atrial Pressure (from IVC)     3           RA Major Axis     4.58           RBC   3.63     3.74         RDW   11.5     11.4         RV S'     0.02           RVDD     3.92           RVOT peak stanley     0.76           RVOT peak VTI     16.1           Sodium         130       STJ     3.52            TAPSE     2.30           TDI SEPTAL     0.06           TDI LATERAL     0.05           Triscuspid Valve Regurgitation Peak Gradient     17           TR Max Stanley     2.05           Troponin I 0.119  Comment: The reference interval for Troponin I represents the 99th percentile   cutoff   for our facility and is consistent with 3rd generation assay   performance.                 TV rest pulmonary artery pressure     20           WBC   5.60     5.39                                Significant Imaging: Echocardiogram: Transthoracic echo (TTE) complete ([facility]upid Only):   Results for orders placed or performed during the hospital encounter of 06/02/23   Echo   Result Value Ref Range    BSA 1.96 m2    TDI SEPTAL 0.06 m/s    LV LATERAL E/E' RATIO 7.40 m/s    LV SEPTAL E/E' RATIO 6.17 m/s    LA WIDTH 3.20 cm    IVC diameter 1.91 cm    Left Ventricular Outflow Tract Mean Velocity 0.97 cm/s    Left Ventricular Outflow Tract Mean Gradient 4.25 mmHg    Pulmonary Valve Mean Velocity 0.77 m/s    TDI LATERAL 0.05 m/s    PV PEAK VELOCITY 1.02 cm/s    LVIDd 4.85 3.5 - 6.0 cm    IVS 1.53 (A) 0.6 - 1.1 cm    Posterior Wall 1.51 (A) 0.6 - 1.1 cm    Ao root annulus 3.54 cm    LVIDs 4.11 (A) 2.1 - 4.0 cm    FS 15 28 - 44 %    STJ 3.52 cm    Ascending aorta 3.61 cm    LV mass 314.33 g    RVDD 3.92 cm    TAPSE 2.30 cm    RV S' 0.02 cm/s    Left Ventricle Relative Wall Thickness 0.62 cm    AV mean gradient 8 mmHg    AV valve area 3.28 cm2    AV Velocity Ratio 0.70     AV index (prosthetic) 0.79     E/A ratio 0.80     Mean e' 0.06 m/s    E wave deceleration time 294.04 msec    IVRT 159.85 msec    Pulm vein S/D ratio 1.81     LVOT diameter 2.30 cm    LVOT area 4.2 cm2    LVOT peak stanley 1.35 m/s    LVOT peak VTI 24.40 cm    Ao peak stanley 1.94 m/s    Ao VTI 30.9 cm    RVOT peak stanley 0.76 m/s    RVOT peak VTI 16.1 cm    LVOT stroke volume 101.32 cm3    AV peak gradient 15 mmHg    PV mean gradient 1.21 mmHg    E/E' ratio 6.73 m/s    MV Peak E Stanley 0.37  m/s    TR Max Stanley 2.05 m/s    MV Peak A Stanley 0.46 m/s    PV Peak S Stanley 0.47 m/s    PV Peak D Stanley 0.26 m/s    LV Systolic Volume 74.60 mL    LV Systolic Volume Index 37.9 mL/m2    LV Diastolic Volume 110.03 mL    LV Diastolic Volume Index 55.85 mL/m2    LV Mass Index 160 g/m2    RA Major Axis 4.58 cm    Left Atrium Minor Axis 4.30 cm    Left Atrium Major Axis 4.29 cm    Triscuspid Valve Regurgitation Peak Gradient 17 mmHg    LA Volume Index (Mod) 17.6 mL/m2    LA volume (mod) 34.68 cm3    Right Atrial Pressure (from IVC) 3 mmHg    EF 20 %    TV rest pulmonary artery pressure 20 mmHg    Narrative    · The left ventricle is mildly enlarged with concentric hypertrophy and   severely decreased systolic function.  · The estimated ejection fraction is 20%.  · Grade I left ventricular diastolic dysfunction.  · There are segmental left ventricular wall motion abnormalities.  · Normal right ventricular size with normal right ventricular systolic   function.  · Normal central venous pressure (3 mmHg).  · The estimated PA systolic pressure is 20 mmHg.  · A small spherical solid left ventricular thrombus is possible. The   thrombus is fixed and located in the apex.        Assessment and Plan:     * Serum total bilirubin elevated  S/p abd u/s - f/u with GI/hepatology     Left ventricular apical thrombus  Cont heparin drip   Rec anticoag upon DC x min 3 months with repeat ECHO     Acute on chronic combined systolic and diastolic congestive heart failure  Not on diuretics now due to n/v  Monitor volume status, BNP ordered   Will start OMT once n/v resolves and renal function stable   Discuss [facility] upon DC      NSTEMI (non-ST elevated myocardial infarction)  Cont ACS tx - asa, statin, bb, heparin drip  R/LHC Monday with Dr. French pending stable renal function  Reg GI eval for N/V in case pt has PCI needing to remain on DAPT    Hypokalemia  Replete K > 4, Mg > 2    Acute renal failure  S/p IVF improving    Elevated troponin  See  plan for NSTEMI    Vomiting  Rec GI eval if no improvement     Hypertension  Titrate meds         VTE Risk Mitigation (From admission, onward)         Ordered     heparin 25,000 units in dextrose 5% (100 units/ml) IV bolus from bag - ADDITIONAL PRN BOLUS - 60 units/kg (max bolus 4000 units)  As needed (PRN)        Question:  Heparin Infusion Adjustment (DO NOT MODIFY ANSWER)  Answer:  [email or web address]\epic\Images\Pharmacy\HeparinInfusions\heparin LOW INTENSITY nomogram for [facility] [id number]    06/03/23 0858     heparin 25,000 units in dextrose 5% (100 units/ml) IV bolus from bag - ADDITIONAL PRN BOLUS - 30 units/kg (max bolus 4000 units)  As needed (PRN)        Question:  Heparin Infusion Adjustment (DO NOT MODIFY ANSWER)  Answer:  [email or web address]\epic\Images\Pharmacy\HeparinInfusions\heparin LOW INTENSITY nomogram for [facility] [id number]    06/03/23 0858     heparin 25,000 units in dextrose 5% 250 mL (100 units/mL) infusion LOW INTENSITY nomogram - [facility]  Continuous        Question Answer Comment   Heparin Infusion Adjustment (DO NOT MODIFY ANSWER) [email or web address]\epic\Images\Pharmacy\HeparinInfusions\heparin LOW INTENSITY nomogram for [facility] [id number]    Begin at (in units/kg/hr) 12        06/03/23 0858                Thank you for your consult. I will follow-up with patient. Please contact us if you have any additional questions.    Chicho Singh Md, MD  Cardiology   [facility]

## 2023-06-03 NOTE — ED PROVIDER NOTES
"SCRIBE #1 NOTE: I, Elif Timbo, am scribing for, and in the presence of, Tomas Bear MD. I have scribed the entire note.       History     Chief Complaint   Patient presents with    Emesis     X 4 days, vomiting multiple times a day, dizzy, reports confusion for 3 days     Review of patient's allergies indicates:   Allergen Reactions    Hydrocodone      "I get flustered and disoriented when I take hydrocodone"         History of Present Illness     HPI    6/2/2023, 11:41 PM  History obtained from the patient      History of Present Illness: Michael Hopper is a 57 y.o. male patient with a PMHx of coronary artery disease and hypertension who presents to the Emergency Department for evaluation of emesis which onset 3 days PTA. Symptoms are constant and moderate in severity. Pt states he has about 12 emesis episodes a day ever since sxs started. He is also c/o burning in upper GI tract d/t emesis.  Associated sxs include light-headedness, abd pain and chills. Patient denies any diarrhea, and all other sxs at this time. No further complaints or concerns at this time.       Arrival mode: Personal vehicle      PCP: Nkechi Madrid NP        Past Medical History:  Past Medical History:   Diagnosis Date    Coronary artery disease     Hypertension        Past Surgical History:  Past Surgical History:   Procedure Laterality Date    APPENDECTOMY      CARDIAC SURGERY      cardiac stents          Family History:  No family history on file.    Social History:  Social History     Tobacco Use    Smoking status: Every Day    Smokeless tobacco: Not on file   Substance and Sexual Activity    Alcohol use: No    Drug use: Not on file    Sexual activity: Not on file        Review of Systems     Review of Systems   Constitutional:  Positive for chills.   Gastrointestinal:  Positive for abdominal pain and vomiting. Negative for diarrhea.   Neurological:  Positive for light-headedness.      Physical Exam     Initial Vitals [06/02/23 " 2055]   BP Pulse Resp Temp SpO2   129/86 98 18 98.2 °F (36.8 °C) 99 %      MAP       --          Physical Exam  Nursing Notes and Vital Signs Reviewed.  Constitutional: Patient is in no acute distress. Well-developed and well-nourished.  Head: Atraumatic. Normocephalic.  Eyes: PERRL. EOM intact. Conjunctivae are not pale. No scleral icterus.  ENT: Mucous membranes are moist. Oropharynx is clear and symmetric.    Neck: Supple. Full ROM. No lymphadenopathy.  Cardiovascular: Regular rate. Regular rhythm. No murmurs, rubs, or gallops. Distal pulses are 2+ and symmetric.  Pulmonary/Chest: No respiratory distress. Clear to auscultation bilaterally. No wheezing or rales.  Abdominal: Soft and non-distended.  There is epigastric tenderness.  No rebound, guarding, or rigidity. Good bowel sounds.  Genitourinary: No CVA tenderness  Musculoskeletal: Moves all extremities. No obvious deformities. No edema. No calf tenderness.  Skin: Warm and dry.  Neurological:  Alert, awake, and appropriate.  Normal speech.  No acute focal neurological deficits are appreciated.  Psychiatric: Normal affect. Good eye contact. Appropriate in content.     ED Course   Critical Care    Date/Time: 6/14/2023 1:32 AM  Performed by: Tomas Bear MD  Authorized by: Tomas Bear MD   Direct patient critical care time: 6 minutes  Additional history critical care time: 7 minutes  Ordering / reviewing critical care time: 5 minutes  Documentation critical care time: 4 minutes  Consulting other physicians critical care time: 3 minutes  Consult with family critical care time: 4 minutes  Other critical care time: 5 minutes  Total critical care time (exclusive of procedural time) : 34 minutes  Critical care time was exclusive of separately billable procedures and treating other patients and teaching time.  Critical care was necessary to treat or prevent imminent or life-threatening deterioration of the following conditions: Elevated troponin.  Critical care was  time spent personally by me on the following activities: blood draw for specimens, development of treatment plan with patient or surrogate, discussions with consultants, interpretation of cardiac output measurements, evaluation of patient's response to treatment, examination of patient, obtaining history from patient or surrogate, ordering and performing treatments and interventions, ordering and review of laboratory studies, ordering and review of radiographic studies, pulse oximetry, re-evaluation of patient's condition and review of old charts.      ED Vital Signs:  Vitals:    06/02/23 2055 06/03/23 0300   BP: 129/86 132/60   Pulse: 98 63   Resp: 18 12   Temp: 98.2 °F (36.8 °C) 99 °F (37.2 °C)   TempSrc: Oral Oral   SpO2: 99% 100%   Weight: 76 kg (167 lb 8.8 oz)    Height: 6' (1.829 m)        Abnormal Lab Results:  Labs Reviewed   CBC W/ AUTO DIFFERENTIAL - Abnormal; Notable for the following components:       Result Value    RBC 4.48 (*)     MCH 35.3 (*)     MCHC 37.4 (*)     Gran % 76.7 (*)     Lymph % 13.0 (*)     All other components within normal limits   COMPREHENSIVE METABOLIC PANEL - Abnormal; Notable for the following components:    Sodium 132 (*)     Potassium 3.0 (*)     Chloride 79 (*)     CO2 38 (*)     Glucose 121 (*)     BUN 24 (*)     Creatinine 1.7 (*)     Total Bilirubin 2.7 (*)     AST 67 (*)     ALT 78 (*)     eGFR 46 (*)     All other components within normal limits   DRUG SCREEN PANEL, URINE EMERGENCY - Abnormal; Notable for the following components:    Creatinine, Urine 433.9 (*)     All other components within normal limits    Narrative:     Specimen Source->Urine   URINALYSIS, REFLEX TO URINE CULTURE - Abnormal; Notable for the following components:    Color, UA Orange (*)     Appearance, UA Hazy (*)     Specific Gravity, UA >1.030 (*)     Protein, UA 2+ (*)     Ketones, UA 1+ (*)     Bilirubin (UA) 1+ (*)     Occult Blood UA 1+ (*)     Urobilinogen, UA 4.0-6.0 (*)     All other  components within normal limits    Narrative:     Specimen Source->Urine   TROPONIN I - Abnormal; Notable for the following components:    Troponin I 0.159 (*)     All other components within normal limits   URINALYSIS MICROSCOPIC - Abnormal; Notable for the following components:    RBC, UA 7 (*)     WBC, UA 8 (*)     Hyaline Casts, UA 60 (*)     All other components within normal limits    Narrative:     Specimen Source->Urine   TROPONIN I - Abnormal; Notable for the following components:    Troponin I 0.131 (*)     All other components within normal limits   POCT GLUCOSE - Abnormal; Notable for the following components:    POCT Glucose 130 (*)     All other components within normal limits   MAGNESIUM   MAGNESIUM   LIPASE   TROPONIN I        All Lab Results:  Results for orders placed or performed during the hospital encounter of 06/02/23   CBC auto differential   Result Value Ref Range    WBC 7.54 3.90 - 12.70 K/uL    RBC 4.48 (L) 4.60 - 6.20 M/uL    Hemoglobin 15.8 14.0 - 18.0 g/dL    Hematocrit 42.2 40.0 - 54.0 %    MCV 94 82 - 98 fL    MCH 35.3 (H) 27.0 - 31.0 pg    MCHC 37.4 (H) 32.0 - 36.0 g/dL    RDW 11.6 11.5 - 14.5 %    Platelets 208 150 - 450 K/uL    MPV 9.9 9.2 - 12.9 fL    Immature Granulocytes 0.4 0.0 - 0.5 %    Gran # (ANC) 5.8 1.8 - 7.7 K/uL    Immature Grans (Abs) 0.03 0.00 - 0.04 K/uL    Lymph # 1.0 1.0 - 4.8 K/uL    Mono # 0.7 0.3 - 1.0 K/uL    Eos # 0.0 0.0 - 0.5 K/uL    Baso # 0.01 0.00 - 0.20 K/uL    nRBC 0 0 /100 WBC    Gran % 76.7 (H) 38.0 - 73.0 %    Lymph % 13.0 (L) 18.0 - 48.0 %    Mono % 9.7 4.0 - 15.0 %    Eosinophil % 0.1 0.0 - 8.0 %    Basophil % 0.1 0.0 - 1.9 %    Differential Method Automated    Comprehensive metabolic panel   Result Value Ref Range    Sodium 132 (L) 136 - 145 mmol/L    Potassium 3.0 (L) 3.5 - 5.1 mmol/L    Chloride 79 (L) 95 - 110 mmol/L    CO2 38 (H) 23 - 29 mmol/L    Glucose 121 (H) 70 - 110 mg/dL    BUN 24 (H) 6 - 20 mg/dL    Creatinine 1.7 (H) 0.5 - 1.4 mg/dL     Calcium 10.5 8.7 - 10.5 mg/dL    Total Protein 7.7 6.0 - 8.4 g/dL    Albumin 4.0 3.5 - 5.2 g/dL    Total Bilirubin 2.7 (H) 0.1 - 1.0 mg/dL    Alkaline Phosphatase 76 55 - 135 U/L    AST 67 (H) 10 - 40 U/L    ALT 78 (H) 10 - 44 U/L    Anion Gap 15 8 - 16 mmol/L    eGFR 46 (A) >60 mL/min/1.73 m^2   Drug screen panel, emergency   Result Value Ref Range    Benzodiazepines Negative Negative    Methadone metabolites Negative Negative    Cocaine (Metab.) Negative Negative    Opiate Scrn, Ur Negative Negative    Barbiturate Screen, Ur Negative Negative    Amphetamine Screen, Ur Negative Negative    THC Negative Negative    Phencyclidine Negative Negative    Creatinine, Urine 433.9 (H) 23.0 - 375.0 mg/dL    Toxicology Information SEE COMMENT    Urinalysis, Reflex to Urine Culture Urine, Clean Catch    Specimen: Urine   Result Value Ref Range    Specimen UA Urine, Clean Catch     Color, UA Orange (A) Yellow, Straw, Viki    Appearance, UA Hazy (A) Clear    pH, UA 5.0 5.0 - 8.0    Specific Gravity, UA >1.030 (A) 1.005 - 1.030    Protein, UA 2+ (A) Negative    Glucose, UA Negative Negative    Ketones, UA 1+ (A) Negative    Bilirubin (UA) 1+ (A) Negative    Occult Blood UA 1+ (A) Negative    Nitrite, UA Negative Negative    Urobilinogen, UA 4.0-6.0 (A) <2.0 EU/dL    Leukocytes, UA Negative Negative   Troponin I   Result Value Ref Range    Troponin I 0.159 (H) 0.000 - 0.026 ng/mL   Magnesium   Result Value Ref Range    Magnesium 2.6 1.6 - 2.6 mg/dL   Urinalysis Microscopic   Result Value Ref Range    RBC, UA 7 (H) 0 - 4 /hpf    WBC, UA 8 (H) 0 - 5 /hpf    Bacteria None None-Occ /hpf    Squam Epithel, UA 1 /hpf    Hyaline Casts, UA 60 (A) 0-1/lpf /lpf    Microscopic Comment SEE COMMENT    Troponin I   Result Value Ref Range    Troponin I 0.131 (H) 0.000 - 0.026 ng/mL   POCT glucose   Result Value Ref Range    POCT Glucose 130 (H) 70 - 110 mg/dL        Imaging Results:  Imaging Results              X-Ray Chest AP Portable (Final  result)  Result time 06/02/23 22:01:28      Final result by Phill Torres MD (06/02/23 22:01:28)                   Impression:      No acute abnormality.      Electronically signed by: Phill Torres  Date:    06/02/2023  Time:    22:01               Narrative:    EXAMINATION:  XR CHEST AP PORTABLE    CLINICAL HISTORY:  Disorientation, unspecified    TECHNIQUE:  Single frontal view of the chest was performed.    COMPARISON:  None    FINDINGS:  The lungs are clear, with normal appearance of pulmonary vasculature and no pleural effusion or pneumothorax.    The cardiac silhouette is normal in size. The hilar and mediastinal contours are unremarkable.    Bones are intact.                                       The EKG was ordered, reviewed, and independently interpreted by the ED provider.  Interpretation time: 22:11  Rate: 86 BPM  Rhythm:  Sinus rhythm with occasional premature ventricular complexes  Interpretation: Left axis deviation.   Minimal voltage criteria for LVH, may be normal variant (Shelby product).   Anteroseptal infarct, age undetermined. No STEMI.    The EKG was ordered, reviewed, and independently interpreted by the ED provider.  Interpretation time: 23:40  Rate: 61 BPM  Rhythm: normal sinus rhythm  Interpretation: : Left axis deviation.   Minimal voltage criteria for LVH, may be normal variant (Shelby product).   Septal infarct, age undetermined.  T wave abnormality, consider inferior ischemia.   T wave abnormality, consider anterolateral ischemia.  No STEMI.           The Emergency Provider reviewed the vital signs and test results, which are outlined above.     ED Discussion     11:57 PM: Discussed pt's case with Dr. Saleh (Cardiology) who states ecg reviewed and old ecgs. No significant changes from old ecgs. 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     5:27 AM: Discussed case with Hank Lopez NP (Hospital Medicine). Dr. Tovar agrees with current care and management of  pt and accepts admission.   Admitting Service: Hospital Medicine  Admitting Physician: Dr. Tovar  Admit to: obs med tele    5:28 AM: Re-evaluated pt. I have discussed test results, shared treatment plan, and the need for admission with patient and family at bedside. Pt and family express understanding at this time and agree with all information. All questions answered. Pt and family have no further questions or concerns at this time. Pt is ready for admit.         Medical Decision Making:   Clinical Tests:   Lab Tests: Ordered and Reviewed  Radiological Study: Ordered and Reviewed  Medical Tests: Ordered and Reviewed       DDx: ACS, Pancreatitis, Dehydration, Gastroenteritis, Gastritis, GERD    ED Medication(s):  Medications   aluminum-magnesium hydroxide-simethicone 200-200-20 mg/5 mL suspension 30 mL (30 mLs Oral Given 6/3/23 0013)     And   LIDOcaine HCl 2% oral solution 15 mL (15 mLs Oral Not Given 6/3/23 0030)   atorvastatin tablet 10 mg (has no administration in time range)   clopidogreL tablet 75 mg (has no administration in time range)   metoprolol succinate (TOPROL-XL) 24 hr tablet 25 mg (has no administration in time range)   0.9%  NaCl infusion (has no administration in time range)   ondansetron injection 8 mg (has no administration in time range)   promethazine (PHENERGAN) 6.25 mg in dextrose 5 % (D5W) 50 mL IVPB (has no administration in time range)   lactated ringers bolus 1,000 mL (0 mLs Intravenous Stopped 6/3/23 0114)   pantoprazole EC tablet 40 mg (40 mg Oral Given 6/3/23 0013)   potassium chloride 10 mEq in 100 mL IVPB (0 mEq Intravenous Stopped 6/3/23 0322)   aspirin tablet 325 mg (325 mg Oral Given 6/3/23 0013)   ondansetron injection 4 mg (4 mg Intravenous Given 6/3/23 0401)       New Prescriptions    No medications on file               Scribe Attestation:   Scribe #1: I performed the above scribed service and the documentation accurately describes the services I performed. I attest to the  accuracy of the note.     Attending:   Physician Attestation Statement for Scribe #1: I, Tomas Bear MD, personally performed the services described in this documentation, as scribed by Elif Izquierdo, in my presence, and it is both accurate and complete.           Clinical Impression       ICD-10-CM ICD-9-CM   1. CONNOR (acute kidney injury)  N17.9 584.9   2. Confusion  R41.0 298.9   3. Elevated troponin  R77.8 790.6   4. Dehydration  E86.0 276.51   5. Abnormal EKG  R94.31 794.31       Disposition:   Disposition: Placed in Observation  Condition: Fair      Tomas Bear MD  06/03/23 0550       Tomas Bear MD  06/14/23 0133

## 2023-06-03 NOTE — ED NOTES
Assumed care of pt at this time. PT resting in ED stretcher with heparin infusion begun. Skin warm and dry, RR even and unlabored. VSS. NADN.

## 2023-06-04 LAB
ALBUMIN SERPL BCP-MCNC: 3.1 G/DL (ref 3.5–5.2)
ALP SERPL-CCNC: 67 U/L (ref 55–135)
ALT SERPL W/O P-5'-P-CCNC: 48 U/L (ref 10–44)
ANION GAP SERPL CALC-SCNC: 14 MMOL/L (ref 8–16)
APTT PPP: 38.3 SEC (ref 21–32)
APTT PPP: 66.8 SEC (ref 21–32)
APTT PPP: 91.4 SEC (ref 21–32)
APTT PPP: 91.4 SEC (ref 21–32)
AST SERPL-CCNC: 55 U/L (ref 10–40)
BASOPHILS # BLD AUTO: 0.02 K/UL (ref 0–0.2)
BASOPHILS NFR BLD: 0.4 % (ref 0–1.9)
BILIRUB DIRECT SERPL-MCNC: 0.4 MG/DL (ref 0.1–0.3)
BILIRUB SERPL-MCNC: 1.4 MG/DL (ref 0.1–1)
BUN SERPL-MCNC: 19 MG/DL (ref 6–20)
CALCIUM SERPL-MCNC: 9.4 MG/DL (ref 8.7–10.5)
CHLORIDE SERPL-SCNC: 93 MMOL/L (ref 95–110)
CO2 SERPL-SCNC: 25 MMOL/L (ref 23–29)
CREAT SERPL-MCNC: 1.2 MG/DL (ref 0.5–1.4)
DIFFERENTIAL METHOD: ABNORMAL
EOSINOPHIL # BLD AUTO: 0 K/UL (ref 0–0.5)
EOSINOPHIL NFR BLD: 0.5 % (ref 0–8)
ERYTHROCYTE [DISTWIDTH] IN BLOOD BY AUTOMATED COUNT: 11.5 % (ref 11.5–14.5)
EST. GFR  (NO RACE VARIABLE): >60 ML/MIN/1.73 M^2
GLUCOSE SERPL-MCNC: 103 MG/DL (ref 70–110)
HCT VFR BLD AUTO: 35.8 % (ref 40–54)
HGB BLD-MCNC: 13 G/DL (ref 14–18)
IMM GRANULOCYTES # BLD AUTO: 0.01 K/UL (ref 0–0.04)
IMM GRANULOCYTES NFR BLD AUTO: 0.2 % (ref 0–0.5)
LYMPHOCYTES # BLD AUTO: 1 K/UL (ref 1–4.8)
LYMPHOCYTES NFR BLD: 16.7 % (ref 18–48)
MAGNESIUM SERPL-MCNC: 2.4 MG/DL (ref 1.6–2.6)
MCH RBC QN AUTO: 34.2 PG (ref 27–31)
MCHC RBC AUTO-ENTMCNC: 36.3 G/DL (ref 32–36)
MCV RBC AUTO: 94 FL (ref 82–98)
MONOCYTES # BLD AUTO: 0.5 K/UL (ref 0.3–1)
MONOCYTES NFR BLD: 8.1 % (ref 4–15)
NEUTROPHILS # BLD AUTO: 4.2 K/UL (ref 1.8–7.7)
NEUTROPHILS NFR BLD: 74.1 % (ref 38–73)
NRBC BLD-RTO: 0 /100 WBC
PLATELET # BLD AUTO: 145 K/UL (ref 150–450)
PMV BLD AUTO: 9.4 FL (ref 9.2–12.9)
POTASSIUM SERPL-SCNC: 4.3 MMOL/L (ref 3.5–5.1)
PROT SERPL-MCNC: 6.3 G/DL (ref 6–8.4)
RBC # BLD AUTO: 3.8 M/UL (ref 4.6–6.2)
SODIUM SERPL-SCNC: 132 MMOL/L (ref 136–145)
WBC # BLD AUTO: 5.7 K/UL (ref 3.9–12.7)

## 2023-06-04 PROCEDURE — 80053 COMPREHEN METABOLIC PANEL: CPT | Performed by: INTERNAL MEDICINE

## 2023-06-04 PROCEDURE — 83735 ASSAY OF MAGNESIUM: CPT | Performed by: INTERNAL MEDICINE

## 2023-06-04 PROCEDURE — 11000001 HC ACUTE MED/SURG PRIVATE ROOM

## 2023-06-04 PROCEDURE — 63600175 PHARM REV CODE 636 W HCPCS: Performed by: INTERNAL MEDICINE

## 2023-06-04 PROCEDURE — 85025 COMPLETE CBC W/AUTO DIFF WBC: CPT | Performed by: INTERNAL MEDICINE

## 2023-06-04 PROCEDURE — 25000003 PHARM REV CODE 250: Performed by: INTERNAL MEDICINE

## 2023-06-04 PROCEDURE — 85730 THROMBOPLASTIN TIME PARTIAL: CPT | Mod: 91 | Performed by: INTERNAL MEDICINE

## 2023-06-04 PROCEDURE — 36415 COLL VENOUS BLD VENIPUNCTURE: CPT | Performed by: INTERNAL MEDICINE

## 2023-06-04 PROCEDURE — G0378 HOSPITAL OBSERVATION PER HR: HCPCS

## 2023-06-04 PROCEDURE — 82248 BILIRUBIN DIRECT: CPT | Performed by: INTERNAL MEDICINE

## 2023-06-04 PROCEDURE — 85730 THROMBOPLASTIN TIME PARTIAL: CPT | Performed by: INTERNAL MEDICINE

## 2023-06-04 PROCEDURE — 25000003 PHARM REV CODE 250: Performed by: NURSE PRACTITIONER

## 2023-06-04 RX ORDER — LIDOCAINE HYDROCHLORIDE 20 MG/ML
15 SOLUTION OROPHARYNGEAL ONCE
Status: COMPLETED | OUTPATIENT
Start: 2023-06-04 | End: 2023-06-04

## 2023-06-04 RX ORDER — MAG HYDROX/ALUMINUM HYD/SIMETH 200-200-20
30 SUSPENSION, ORAL (FINAL DOSE FORM) ORAL ONCE
Status: COMPLETED | OUTPATIENT
Start: 2023-06-04 | End: 2023-06-04

## 2023-06-04 RX ADMIN — SODIUM CHLORIDE AND POTASSIUM CHLORIDE: 9; 1.49 INJECTION, SOLUTION INTRAVENOUS at 07:06

## 2023-06-04 RX ADMIN — ALUMINUM HYDROXIDE, MAGNESIUM HYDROXIDE, AND DIMETHICONE 30 ML: 200; 20; 200 SUSPENSION ORAL at 11:06

## 2023-06-04 RX ADMIN — TRAMADOL HYDROCHLORIDE 50 MG: 50 TABLET, COATED ORAL at 02:06

## 2023-06-04 RX ADMIN — LIDOCAINE HYDROCHLORIDE 15 ML: 20 SOLUTION ORAL at 11:06

## 2023-06-04 RX ADMIN — CLOPIDOGREL BISULFATE 75 MG: 75 TABLET ORAL at 09:06

## 2023-06-04 RX ADMIN — HEPARIN SODIUM 17 UNITS/KG/HR: 10000 INJECTION, SOLUTION INTRAVENOUS at 02:06

## 2023-06-04 RX ADMIN — HEPARIN SODIUM 19 UNITS/KG/HR: 10000 INJECTION, SOLUTION INTRAVENOUS at 07:06

## 2023-06-04 RX ADMIN — ATORVASTATIN CALCIUM 10 MG: 10 TABLET, FILM COATED ORAL at 09:06

## 2023-06-04 NOTE — PROGRESS NOTES
Plan for R/LHC tomorrow; keep NPO past midnight; will discuss with Dr. French and consent in AM  Cont ACS tx       Chicho Singh MD, [facility]  Cardiovascular Disease  [facility], [place]  [phone] (P)

## 2023-06-04 NOTE — ASSESSMENT & PLAN NOTE
Likely related to IVVD r/t NV  Improving with IVF  Creatinine currently 1.2 from 1.7.  Continue to follow

## 2023-06-04 NOTE — ASSESSMENT & PLAN NOTE
In setting of 3 day history of nausea, vomiting, and food intolerance. AST/ALT are also elevated. Bilirubin in urine noted as well.   --will need to rule out hepatobiliary involvement  --check lipase and abdominal ultrasound now  --NPO for now  --IVFs  --antiemetics    6/4/23  -Abdominal US -- sludge filled gallbladder, no gallstones. No evidence of cholecystitis. No biliary ductal obstruction. Fatty liver.  -T. Bilirubin and liver enzymes improving with IVF

## 2023-06-04 NOTE — HOSPITAL COURSE
Michael Hopper is a 57  year old male place on observation for elevated t.bilirubin. Abdominal US showed sludge filled gallbladder, no gallstones. No evidence of cholecystitis. No biliary ductal obstruction. Fatty liver. T. Bilirubin and liver enzymes improving with IVF. Cardiology consulted due to elevated troponin. ECHO showed left ventricle is mildly enlarged with severely decreased systolic function. Ejection fraction is 20%. There is grade I diastolic dysfunction consistent with impaired relaxation. There is a small spherical solid possible thrombus. The thrombus is fixed and located in the apex. There are segmental wall motion abnormalities. He was started on heparin drip for ACS. Will need LHC once renal function improves. On 6/5/23, R/LHC on 6/6/23 with Dr. French. Heparin infusion and current plan of care continued. Hypokalemia noted with K added to IVF. Kidney function normalized. H/H stable upon repeat evaluation. T. Bili improved and LFTs normalized. Pt verbalized symptom improvement upon exam with N/V resolved. ASA, Statin, and Plavix continued- beta blocker held secondary to bradycardia- will initiate at lower dose as appropriate. Lifevest upon discharge discussed with patient per Cardiology. On 6/6/23, pt taken to Cath lab and LAD stent replaced per Cardiology. BP elevated with Lopressor resumed and Brilinta initiated. On 6/7/23, Case discussed with Cardiology and Lovenox bridge initiated and pharmacy consulted for Coumadin due to thrombus at apex. Life vest to be obtained outpatient. Discharge anticipated once INR trending upward and patient able to demonstrate administration of Lovenox injections. Vital signs stable. Plan of care discussed with patient per  and Cardiology with understanding verbalized. On 6/8/23, patient transition to Eliquis for apical thrombus.  Case discussed with Cardiology and patient cleared for discharge with recommendations obtained.  Vital signs stable with no signs of  acute distress witnessed or reported. Patient seen and examined in deemed medically stable for discharge to home.  Medications reconciled and aspirin, Brilinta, Eliquis, and Cozaar prescribed.  Patient instructed to maintain compliance with low-sodium diet, prescribed medications, and follow-up appointments with understanding verbalized.  Patient instructed to follow-up with PCP and Cardiology upon discharge further evaluation.

## 2023-06-04 NOTE — PROGRESS NOTES
"[facility] Medicine  Progress Note    Patient Name: Michael Hopper  MRN: [id number]  Patient Class: OP- Observation   Admission Date: 6/2/2023  Length of Stay: 0 days  Attending Physician: Seble Khoury DO  Primary Care Provider: Nkechi Madrid NP        Subjective:     Principal Problem:Serum total bilirubin elevated        HPI:  Michael Hopper is a 57 year old male with history of CAD and hypertension who presents to ED for further evaluation of nausea and vomiting that began three days ago. He came to the ED due dizziness and lightheadedness upon stand and "cloudy thinking". He admits to some generalized abdominal pain. His last BM was one week ago. He denies fever, chest pain, or palpitations. He denies opioid use.     In ED, CXR negative. Labs revealed elevated liver enzymes and elevated Total Bilirubin. Abdominal ultrasound and lipase are pending. Initial troponin 0.159, trending down 0.131. Case has been discussed with  Cardiology. Patient will be placed in observation under the care of hospital medicine.       Overview/Hospital Course:  Michael Hopper is a 57  year old male place on observation for elevated t.bilirubin. Abdominal US showed sludge filled gallbladder, no gallstones. No evidence of cholecystitis. No biliary ductal obstruction. Fatty liver. T. Bilirubin and liver enzymes improving with IVF. Cardiology consulted due to elevated troponin. ECHO showed left ventricle is mildly enlarged with severely decreased systolic function. Ejection fraction is 20%. There is grade I diastolic dysfunction consistent with impaired relaxation. There is a small spherical solid possible thrombus. The thrombus is fixed and located in the apex. There are segmental wall motion abnormalities. He was started on heparin drip for ACS. Will need LHC once renal function improves. He will likely have R/LHC on Monday with Dr. French.          Interval History: Patient seen and examined. Pt reports having "burning" " sensation in his esophagus when he swallows. He currently denies CP, SOB, abdominal pain, N/V. Will place a one time order for GI cocktail and monitor.    Review of Systems   Constitutional:  Negative for chills, diaphoresis, fatigue and fever.   HENT:  Negative for drooling, ear pain, rhinorrhea and sore throat.    Eyes: Negative.    Respiratory:  Negative for cough, shortness of breath and wheezing.    Cardiovascular:  Negative for palpitations and leg swelling.   Gastrointestinal:  Negative for abdominal pain, constipation, diarrhea, nausea and vomiting.   Endocrine: Negative.    Genitourinary:  Negative for dysuria, hematuria and urgency.   Musculoskeletal: Negative.    Skin:  Negative for color change and wound.   Allergic/Immunologic: Negative.    Neurological:  Negative for dizziness, syncope and speech difficulty.   Hematological: Negative.    Psychiatric/Behavioral: Negative.     Objective:     Vital Signs (Most Recent):  Temp: 98.1 °F (36.7 °C) (06/04/23 1628)  Pulse: (!) 52 (06/04/23 1628)  Resp: 18 (06/04/23 1628)  BP: 129/67 (06/04/23 1628)  SpO2: 98 % (06/04/23 1628) Vital Signs (24h Range):  Temp:  [97.9 °F (36.6 °C)-98.3 °F (36.8 °C)] 98.1 °F (36.7 °C)  Pulse:  [50-60] 52  Resp:  [16-18] 18  SpO2:  [98 %-99 %] 98 %  BP: ()/(49-69) 129/67     Weight: 75.8 kg (167 lb)  Body mass index is 22.65 kg/m².    Intake/Output Summary (Last 24 hours) at 6/4/2023 1644  Last data filed at 6/4/2023 1106  Gross per 24 hour   Intake 2157 ml   Output --   Net 2157 ml         Physical Exam  Vitals and nursing note reviewed.   Constitutional:       General: He is not in acute distress.     Appearance: He is well-developed.   HENT:      Head: Normocephalic and atraumatic.      Mouth/Throat:      Mouth: Mucous membranes are moist.   Eyes:      Extraocular Movements: Extraocular movements intact.      Conjunctiva/sclera: Conjunctivae normal.   Cardiovascular:      Rate and Rhythm: Normal rate and regular rhythm.       Heart sounds: Normal heart sounds.   Pulmonary:      Effort: Pulmonary effort is normal. No respiratory distress.      Breath sounds: Normal breath sounds.   Abdominal:      General: Bowel sounds are normal. There is no distension.      Palpations: Abdomen is soft.      Tenderness: There is no abdominal tenderness.   Musculoskeletal:         General: Normal range of motion.      Cervical back: Normal range of motion and neck supple. No edema.   Skin:     General: Skin is warm and dry.      Capillary Refill: Capillary refill takes less than 2 seconds.   Neurological:      General: No focal deficit present.      Mental Status: He is alert and oriented to person, place, and time. Mental status is at baseline.           Significant Labs: All pertinent labs within the past 24 hours have been reviewed.  CBC:   Recent Labs   Lab 06/03/23  0708 06/03/23  0913 06/04/23  0441   WBC 5.39 5.60 5.70   HGB 12.8* 12.6* 13.0*   HCT 34.9* 33.1* 35.8*    156 145*     CMP:   Recent Labs   Lab 06/03/23  0705 06/03/23  1149 06/04/23  0441   * 131* 132*   K 3.0* 2.7* 4.3   CL 84* 88* 93*   CO2 29 31* 25   GLU 94 92 103   BUN 23* 21* 19   CREATININE 1.2 1.1 1.2   CALCIUM 9.2 9.3 9.4   PROT 6.4 5.9* 6.3   ALBUMIN 3.3* 3.1* 3.1*   BILITOT 1.9* 1.8* 1.4*   ALKPHOS 50* 57 67   AST 47* 41* 55*   ALT 54* 52* 48*   ANIONGAP 17* 12 14     Coagulation:   Recent Labs   Lab 06/03/23  0913 06/03/23  1553 06/04/23  1311   INR 1.1  --   --    APTT 22.5   < > 38.3*    < > = values in this interval not displayed.     Magnesium:   Recent Labs   Lab 06/02/23  2157 06/04/23  0441   MG 2.6 2.4       Significant Imaging: I have reviewed all pertinent imaging results/findings within the past 24 hours.      Assessment/Plan:      * Serum total bilirubin elevated  In setting of 3 day history of nausea, vomiting, and food intolerance. AST/ALT are also elevated. Bilirubin in urine noted as well.   --will need to rule out hepatobiliary  involvement  --check lipase and abdominal ultrasound now  --NPO for now  --IVFs  --antiemetics    6/4/23  -Abdominal US -- sludge filled gallbladder, no gallstones. No evidence of cholecystitis. No biliary ductal obstruction. Fatty liver.  -T. Bilirubin and liver enzymes improving with IVF    Hypokalemia  Repleted  Magnesium wnl      Acute renal failure  Likely related to IVVD r/t NV  Improving with IVF  Creatinine currently 1.2 from 1.7.  Continue to follow     Elevated troponin  EKG reviewed by cardiology and unchanged  Suspect demand ischemia r/t vomiting, renal failure, ?GI   Trending down  Check echocardiogram  Cardiology consulted    06/4/23  -ECHO showed left ventricle is mildly enlarged with severely decreased systolic function. Ejection fraction is 20%. There is grade I diastolic dysfunction consistent with impaired relaxation. There is a small spherical solid possible thrombus. The thrombus is fixed and located in the apex. There are segmental wall motion abnormalities.   -He was started on heparin drip for ACS. Will need LHC once renal function improves. He will likely have R/LHC on Monday with Dr. French.       Vomiting  --see plan for Elevated total Bilirubin.  Resolved.      Hypertension  BP stable. Resume metoprolol    VTE Risk Mitigation (From admission, onward)         Ordered     heparin 25,000 units in dextrose 5% (100 units/ml) IV bolus from bag - ADDITIONAL PRN BOLUS - 60 units/kg (max bolus 4000 units)  As needed (PRN)        Question:  Heparin Infusion Adjustment (DO NOT MODIFY ANSWER)  Answer:  [email or web address]\epic\Images\Pharmacy\HeparinInfusions\heparin LOW INTENSITY nomogram for [facility] [id number]    06/03/23 0858     heparin 25,000 units in dextrose 5% (100 units/ml) IV bolus from bag - ADDITIONAL PRN BOLUS - 30 units/kg (max bolus 4000 units)  As needed (PRN)        Question:  Heparin Infusion Adjustment (DO NOT MODIFY ANSWER)  Answer:   [email or web address]\epic\Images\Pharmacy\HeparinInfusions\heparin LOW INTENSITY nomogram for [facility] [id number]    06/03/23 0858     heparin 25,000 units in dextrose 5% 250 mL (100 units/mL) infusion LOW INTENSITY nomogram - [facility]  Continuous        Question Answer Comment   Heparin Infusion Adjustment (DO NOT MODIFY ANSWER) \\[email or web address]\epic\Images\Pharmacy\HeparinInfusions\heparin LOW INTENSITY nomogram for [facility] [id number]    Begin at (in units/kg/hr) 12        06/03/23 0858                Discharge Planning   GAUDENCIO:      Code Status: Not on file   Is the patient medically ready for discharge?:     Reason for patient still in hospital (select all that apply): Patient trending condition and Treatment                     LEATHA Juarez  Department of Hospital Medicine   [facility]

## 2023-06-04 NOTE — SUBJECTIVE & OBJECTIVE
"Interval History: Patient seen and examined. Pt reports having "burning" sensation in his esophagus when he swallows. He currently denies CP, SOB, abdominal pain, N/V. Will place a one time order for GI cocktail and monitor.    Review of Systems   Constitutional:  Negative for chills, diaphoresis, fatigue and fever.   HENT:  Negative for drooling, ear pain, rhinorrhea and sore throat.    Eyes: Negative.    Respiratory:  Negative for cough, shortness of breath and wheezing.    Cardiovascular:  Negative for palpitations and leg swelling.   Gastrointestinal:  Negative for abdominal pain, constipation, diarrhea, nausea and vomiting.   Endocrine: Negative.    Genitourinary:  Negative for dysuria, hematuria and urgency.   Musculoskeletal: Negative.    Skin:  Negative for color change and wound.   Allergic/Immunologic: Negative.    Neurological:  Negative for dizziness, syncope and speech difficulty.   Hematological: Negative.    Psychiatric/Behavioral: Negative.     Objective:     Vital Signs (Most Recent):  Temp: 98.1 °F (36.7 °C) (06/04/23 1628)  Pulse: (!) 52 (06/04/23 1628)  Resp: 18 (06/04/23 1628)  BP: 129/67 (06/04/23 1628)  SpO2: 98 % (06/04/23 1628) Vital Signs (24h Range):  Temp:  [97.9 °F (36.6 °C)-98.3 °F (36.8 °C)] 98.1 °F (36.7 °C)  Pulse:  [50-60] 52  Resp:  [16-18] 18  SpO2:  [98 %-99 %] 98 %  BP: ()/(49-69) 129/67     Weight: 75.8 kg (167 lb)  Body mass index is 22.65 kg/m².    Intake/Output Summary (Last 24 hours) at 6/4/2023 1644  Last data filed at 6/4/2023 1106  Gross per 24 hour   Intake 2157 ml   Output --   Net 2157 ml         Physical Exam  Vitals and nursing note reviewed.   Constitutional:       General: He is not in acute distress.     Appearance: He is well-developed.   HENT:      Head: Normocephalic and atraumatic.      Mouth/Throat:      Mouth: Mucous membranes are moist.   Eyes:      Extraocular Movements: Extraocular movements intact.      Conjunctiva/sclera: Conjunctivae normal. "   Cardiovascular:      Rate and Rhythm: Normal rate and regular rhythm.      Heart sounds: Normal heart sounds.   Pulmonary:      Effort: Pulmonary effort is normal. No respiratory distress.      Breath sounds: Normal breath sounds.   Abdominal:      General: Bowel sounds are normal. There is no distension.      Palpations: Abdomen is soft.      Tenderness: There is no abdominal tenderness.   Musculoskeletal:         General: Normal range of motion.      Cervical back: Normal range of motion and neck supple. No edema.   Skin:     General: Skin is warm and dry.      Capillary Refill: Capillary refill takes less than 2 seconds.   Neurological:      General: No focal deficit present.      Mental Status: He is alert and oriented to person, place, and time. Mental status is at baseline.           Significant Labs: All pertinent labs within the past 24 hours have been reviewed.  CBC:   Recent Labs   Lab 06/03/23  0708 06/03/23  0913 06/04/23  0441   WBC 5.39 5.60 5.70   HGB 12.8* 12.6* 13.0*   HCT 34.9* 33.1* 35.8*    156 145*     CMP:   Recent Labs   Lab 06/03/23  0705 06/03/23  1149 06/04/23  0441   * 131* 132*   K 3.0* 2.7* 4.3   CL 84* 88* 93*   CO2 29 31* 25   GLU 94 92 103   BUN 23* 21* 19   CREATININE 1.2 1.1 1.2   CALCIUM 9.2 9.3 9.4   PROT 6.4 5.9* 6.3   ALBUMIN 3.3* 3.1* 3.1*   BILITOT 1.9* 1.8* 1.4*   ALKPHOS 50* 57 67   AST 47* 41* 55*   ALT 54* 52* 48*   ANIONGAP 17* 12 14     Coagulation:   Recent Labs   Lab 06/03/23  0913 06/03/23  1553 06/04/23  1311   INR 1.1  --   --    APTT 22.5   < > 38.3*    < > = values in this interval not displayed.     Magnesium:   Recent Labs   Lab 06/02/23  2157 06/04/23  0441   MG 2.6 2.4       Significant Imaging: I have reviewed all pertinent imaging results/findings within the past 24 hours.

## 2023-06-04 NOTE — PLAN OF CARE
Discussed plan of care with pt. Pt verbalized understanding. Bed alarm refused with bed at lowest position. Pt reports burning in his throat and abdominal discomfort. Pain managed with ordered medication. Tele monitor [id number] in place. Call light within reach. Purposeful rounding Q2h.          Chart check complete.     Problem: Adult Inpatient Plan of Care  Goal: Plan of Care Review  Outcome: Ongoing, Progressing  Goal: Patient-Specific Goal (Individualized)  Outcome: Ongoing, Progressing  Goal: Absence of Hospital-Acquired Illness or Injury  Outcome: Ongoing, Progressing  Goal: Optimal Comfort and Wellbeing  Outcome: Ongoing, Progressing  Goal: Readiness for Transition of Care  Outcome: Ongoing, Progressing     Problem: Fluid and Electrolyte Imbalance (Acute Kidney Injury/Impairment)  Goal: Fluid and Electrolyte Balance  Outcome: Ongoing, Progressing     Problem: Oral Intake Inadequate (Acute Kidney Injury/Impairment)  Goal: Optimal Nutrition Intake  Outcome: Ongoing, Progressing     Problem: Renal Function Impairment (Acute Kidney Injury/Impairment)  Goal: Effective Renal Function  Outcome: Ongoing, Progressing     Problem: Pain Acute  Goal: Acceptable Pain Control and Functional Ability  Outcome: Ongoing, Progressing     Problem: Fall Injury Risk  Goal: Absence of Fall and Fall-Related Injury  Outcome: Ongoing, Progressing

## 2023-06-04 NOTE — ASSESSMENT & PLAN NOTE
EKG reviewed by cardiology and unchanged  Suspect demand ischemia r/t vomiting, renal failure, ?GI   Trending down  Check echocardiogram  Cardiology consulted    06/4/23  -ECHO showed left ventricle is mildly enlarged with severely decreased systolic function. Ejection fraction is 20%. There is grade I diastolic dysfunction consistent with impaired relaxation. There is a small spherical solid possible thrombus. The thrombus is fixed and located in the apex. There are segmental wall motion abnormalities.   -He was started on heparin drip for ACS. Will need LHC once renal function improves. He will likely have R/LHC on Monday with Dr. French.

## 2023-06-05 PROBLEM — R94.31 ABNORMAL EKG: Status: ACTIVE | Noted: 2023-06-05

## 2023-06-05 PROBLEM — E86.0 DEHYDRATION: Status: ACTIVE | Noted: 2023-06-05

## 2023-06-05 LAB
ALBUMIN SERPL BCP-MCNC: 3 G/DL (ref 3.5–5.2)
ALP SERPL-CCNC: 67 U/L (ref 55–135)
ALT SERPL W/O P-5'-P-CCNC: 42 U/L (ref 10–44)
ANION GAP SERPL CALC-SCNC: 12 MMOL/L (ref 8–16)
ANION GAP SERPL CALC-SCNC: 8 MMOL/L (ref 8–16)
APTT PPP: 44.1 SEC (ref 21–32)
APTT PPP: 50.8 SEC (ref 21–32)
APTT PPP: 58.4 SEC (ref 21–32)
APTT PPP: 77.6 SEC (ref 21–32)
AST SERPL-CCNC: 39 U/L (ref 10–40)
BASOPHILS # BLD AUTO: 0.03 K/UL (ref 0–0.2)
BASOPHILS # BLD AUTO: 0.03 K/UL (ref 0–0.2)
BASOPHILS NFR BLD: 0.6 % (ref 0–1.9)
BASOPHILS NFR BLD: 1.3 % (ref 0–1.9)
BILIRUB SERPL-MCNC: 1.1 MG/DL (ref 0.1–1)
BUN SERPL-MCNC: 17 MG/DL (ref 6–20)
BUN SERPL-MCNC: 18 MG/DL (ref 6–20)
CALCIUM SERPL-MCNC: 8.9 MG/DL (ref 8.7–10.5)
CALCIUM SERPL-MCNC: 8.9 MG/DL (ref 8.7–10.5)
CHLORIDE SERPL-SCNC: 94 MMOL/L (ref 95–110)
CHLORIDE SERPL-SCNC: 95 MMOL/L (ref 95–110)
CO2 SERPL-SCNC: 27 MMOL/L (ref 23–29)
CO2 SERPL-SCNC: 31 MMOL/L (ref 23–29)
CREAT SERPL-MCNC: 1.1 MG/DL (ref 0.5–1.4)
CREAT SERPL-MCNC: 1.2 MG/DL (ref 0.5–1.4)
DIFFERENTIAL METHOD: ABNORMAL
DIFFERENTIAL METHOD: ABNORMAL
EOSINOPHIL # BLD AUTO: 0 K/UL (ref 0–0.5)
EOSINOPHIL # BLD AUTO: 0 K/UL (ref 0–0.5)
EOSINOPHIL NFR BLD: 0.4 % (ref 0–8)
EOSINOPHIL NFR BLD: 0.4 % (ref 0–8)
ERYTHROCYTE [DISTWIDTH] IN BLOOD BY AUTOMATED COUNT: 11.5 % (ref 11.5–14.5)
ERYTHROCYTE [DISTWIDTH] IN BLOOD BY AUTOMATED COUNT: 11.7 % (ref 11.5–14.5)
EST. GFR  (NO RACE VARIABLE): >60 ML/MIN/1.73 M^2
EST. GFR  (NO RACE VARIABLE): >60 ML/MIN/1.73 M^2
GLUCOSE SERPL-MCNC: 83 MG/DL (ref 70–110)
GLUCOSE SERPL-MCNC: 88 MG/DL (ref 70–110)
HCT VFR BLD AUTO: 32.8 % (ref 40–54)
HCT VFR BLD AUTO: 63.5 % (ref 40–54)
HGB BLD-MCNC: 11.9 G/DL (ref 14–18)
HGB BLD-MCNC: 23.5 G/DL (ref 14–18)
IMM GRANULOCYTES # BLD AUTO: 0.01 K/UL (ref 0–0.04)
IMM GRANULOCYTES # BLD AUTO: 0.02 K/UL (ref 0–0.04)
IMM GRANULOCYTES NFR BLD AUTO: 0.4 % (ref 0–0.5)
IMM GRANULOCYTES NFR BLD AUTO: 0.4 % (ref 0–0.5)
LYMPHOCYTES # BLD AUTO: 0.4 K/UL (ref 1–4.8)
LYMPHOCYTES # BLD AUTO: 1.1 K/UL (ref 1–4.8)
LYMPHOCYTES NFR BLD: 15.5 % (ref 18–48)
LYMPHOCYTES NFR BLD: 21.2 % (ref 18–48)
MAGNESIUM SERPL-MCNC: 2.1 MG/DL (ref 1.6–2.6)
MCH RBC QN AUTO: 34.6 PG (ref 27–31)
MCH RBC QN AUTO: 34.9 PG (ref 27–31)
MCHC RBC AUTO-ENTMCNC: 36.3 G/DL (ref 32–36)
MCHC RBC AUTO-ENTMCNC: 37 G/DL (ref 32–36)
MCV RBC AUTO: 93 FL (ref 82–98)
MCV RBC AUTO: 96 FL (ref 82–98)
MONOCYTES # BLD AUTO: 0.1 K/UL (ref 0.3–1)
MONOCYTES # BLD AUTO: 0.4 K/UL (ref 0.3–1)
MONOCYTES NFR BLD: 6 % (ref 4–15)
MONOCYTES NFR BLD: 8.8 % (ref 4–15)
NEUTROPHILS # BLD AUTO: 1.8 K/UL (ref 1.8–7.7)
NEUTROPHILS # BLD AUTO: 3.4 K/UL (ref 1.8–7.7)
NEUTROPHILS NFR BLD: 68.6 % (ref 38–73)
NEUTROPHILS NFR BLD: 76.4 % (ref 38–73)
NRBC BLD-RTO: 0 /100 WBC
NRBC BLD-RTO: 0 /100 WBC
PLATELET # BLD AUTO: 148 K/UL (ref 150–450)
PLATELET # BLD AUTO: 43 K/UL (ref 150–450)
PLATELET BLD QL SMEAR: ABNORMAL
PMV BLD AUTO: 9.4 FL (ref 9.2–12.9)
PMV BLD AUTO: 9.7 FL (ref 9.2–12.9)
POTASSIUM SERPL-SCNC: 3.1 MMOL/L (ref 3.5–5.1)
POTASSIUM SERPL-SCNC: 3.5 MMOL/L (ref 3.5–5.1)
PROT SERPL-MCNC: 5.7 G/DL (ref 6–8.4)
RBC # BLD AUTO: 3.41 M/UL (ref 4.6–6.2)
RBC # BLD AUTO: 6.8 M/UL (ref 4.6–6.2)
SODIUM SERPL-SCNC: 133 MMOL/L (ref 136–145)
SODIUM SERPL-SCNC: 134 MMOL/L (ref 136–145)
WBC # BLD AUTO: 2.32 K/UL (ref 3.9–12.7)
WBC # BLD AUTO: 4.99 K/UL (ref 3.9–12.7)

## 2023-06-05 PROCEDURE — 85025 COMPLETE CBC W/AUTO DIFF WBC: CPT | Performed by: INTERNAL MEDICINE

## 2023-06-05 PROCEDURE — 83735 ASSAY OF MAGNESIUM: CPT | Performed by: INTERNAL MEDICINE

## 2023-06-05 PROCEDURE — 36415 COLL VENOUS BLD VENIPUNCTURE: CPT | Performed by: NURSE PRACTITIONER

## 2023-06-05 PROCEDURE — 25000003 PHARM REV CODE 250: Performed by: NURSE PRACTITIONER

## 2023-06-05 PROCEDURE — 36415 COLL VENOUS BLD VENIPUNCTURE: CPT | Performed by: INTERNAL MEDICINE

## 2023-06-05 PROCEDURE — 25000003 PHARM REV CODE 250: Performed by: PHYSICIAN ASSISTANT

## 2023-06-05 PROCEDURE — 99233 PR SUBSEQUENT HOSPITAL CARE,LEVL III: ICD-10-PCS | Mod: ,,, | Performed by: INTERNAL MEDICINE

## 2023-06-05 PROCEDURE — 11000001 HC ACUTE MED/SURG PRIVATE ROOM

## 2023-06-05 PROCEDURE — 85025 COMPLETE CBC W/AUTO DIFF WBC: CPT | Mod: 91 | Performed by: INTERNAL MEDICINE

## 2023-06-05 PROCEDURE — 85730 THROMBOPLASTIN TIME PARTIAL: CPT | Performed by: INTERNAL MEDICINE

## 2023-06-05 PROCEDURE — 80048 BASIC METABOLIC PNL TOTAL CA: CPT | Mod: XB | Performed by: NURSE PRACTITIONER

## 2023-06-05 PROCEDURE — 21400001 HC TELEMETRY ROOM

## 2023-06-05 PROCEDURE — 99233 SBSQ HOSP IP/OBS HIGH 50: CPT | Mod: ,,, | Performed by: INTERNAL MEDICINE

## 2023-06-05 PROCEDURE — 85730 THROMBOPLASTIN TIME PARTIAL: CPT | Mod: 91 | Performed by: INTERNAL MEDICINE

## 2023-06-05 PROCEDURE — 80053 COMPREHEN METABOLIC PANEL: CPT | Performed by: INTERNAL MEDICINE

## 2023-06-05 PROCEDURE — 63600175 PHARM REV CODE 636 W HCPCS: Performed by: INTERNAL MEDICINE

## 2023-06-05 RX ORDER — ASPIRIN 81 MG/1
81 TABLET ORAL DAILY
Status: DISCONTINUED | OUTPATIENT
Start: 2023-06-05 | End: 2023-06-08 | Stop reason: HOSPADM

## 2023-06-05 RX ADMIN — SODIUM CHLORIDE AND POTASSIUM CHLORIDE: 9; 1.49 INJECTION, SOLUTION INTRAVENOUS at 06:06

## 2023-06-05 RX ADMIN — ASPIRIN 81 MG: 81 TABLET, COATED ORAL at 04:06

## 2023-06-05 RX ADMIN — CLOPIDOGREL BISULFATE 75 MG: 75 TABLET ORAL at 09:06

## 2023-06-05 RX ADMIN — ATORVASTATIN CALCIUM 10 MG: 10 TABLET, FILM COATED ORAL at 09:06

## 2023-06-05 RX ADMIN — HEPARIN SODIUM 16 UNITS/KG/HR: 10000 INJECTION, SOLUTION INTRAVENOUS at 04:06

## 2023-06-05 NOTE — ASSESSMENT & PLAN NOTE
Patient is identified as having Combined Systolic and Diastolic heart failure that is Acute on chronic. CHF is currently controlled. Latest ECHO performed and demonstrates- Results for orders placed during the hospital encounter of 06/02/23    Echo    Interpretation Summary  · The left ventricle is mildly enlarged with concentric hypertrophy and severely decreased systolic function.  · The estimated ejection fraction is 20%.  · Grade I left ventricular diastolic dysfunction.  · There are segmental left ventricular wall motion abnormalities.  · Normal right ventricular size with normal right ventricular systolic function.  · Normal central venous pressure (3 mmHg).  · The estimated PA systolic pressure is 20 mmHg.  · A small spherical solid left ventricular thrombus is possible. The thrombus is fixed and located in the apex.  . Continue Beta Blocker and monitor clinical status closely. Monitor on telemetry. Patient is off CHF pathway.  Monitor strict Is&Os and daily weights.  Place on fluid restriction of gentle hydration due to IVVD . Continue to stress to patient importance of self efficacy and  on diet for CHF. Last BNP reviewed- and noted below   Recent Labs   Lab 06/03/23  0913   BNP 71   .  -echo results reviewed and need for LifeVest upon discharge discussed with patient per Cardiology

## 2023-06-05 NOTE — SUBJECTIVE & OBJECTIVE
Interval History: pt in bed upon exam and denies CP, dyspnea, N/V, and all other cardiac symptoms. R/LHC planned for tomorrow and NPO after MN. Need for life vest discussed per Cardiology. Heparin continued. Hypokalemia noted- IVF w/K infusing. H/H stable. T. Bili trending down with LFTs normalized.     Review of Systems   Constitutional:  Negative for chills, diaphoresis, fatigue and fever.   HENT:  Negative for drooling, ear pain, rhinorrhea and sore throat.    Eyes: Negative.    Respiratory:  Negative for cough, shortness of breath and wheezing.    Cardiovascular:  Negative for palpitations and leg swelling.   Gastrointestinal:  Negative for abdominal pain, constipation, diarrhea, nausea and vomiting.   Endocrine: Negative.    Genitourinary:  Negative for dysuria, hematuria and urgency.   Musculoskeletal: Negative.    Skin:  Negative for color change and wound.   Allergic/Immunologic: Negative.    Neurological:  Negative for dizziness, syncope and speech difficulty.   Hematological: Negative.    Psychiatric/Behavioral: Negative.     Objective:     Vital Signs (Most Recent):  Temp: 98.2 °F (36.8 °C) (06/05/23 1210)  Pulse: 62 (06/05/23 1210)  Resp: 18 (06/05/23 1210)  BP: (!) 108/56 (06/05/23 1210)  SpO2: 100 % (06/05/23 1210) Vital Signs (24h Range):  Temp:  [97.3 °F (36.3 °C)-98.7 °F (37.1 °C)] 98.2 °F (36.8 °C)  Pulse:  [51-74] 62  Resp:  [17-18] 18  SpO2:  [97 %-100 %] 100 %  BP: (102-130)/(56-71) 108/56     Weight: 75.8 kg (167 lb)  Body mass index is 22.65 kg/m².    Intake/Output Summary (Last 24 hours) at 6/5/2023 1623  Last data filed at 6/5/2023 0803  Gross per 24 hour   Intake 1437.44 ml   Output --   Net 1437.44 ml         Physical Exam  Vitals and nursing note reviewed.   Constitutional:       General: He is not in acute distress.     Appearance: He is well-developed.   HENT:      Head: Normocephalic and atraumatic.      Mouth/Throat:      Mouth: Mucous membranes are moist.   Eyes:      Extraocular  Movements: Extraocular movements intact.      Conjunctiva/sclera: Conjunctivae normal.   Cardiovascular:      Rate and Rhythm: Normal rate and regular rhythm.      Heart sounds: Normal heart sounds.   Pulmonary:      Effort: Pulmonary effort is normal. No respiratory distress.      Breath sounds: Normal breath sounds.   Abdominal:      General: Bowel sounds are normal. There is no distension.      Palpations: Abdomen is soft.      Tenderness: There is no abdominal tenderness.   Musculoskeletal:         General: Normal range of motion.      Cervical back: Normal range of motion and neck supple. No edema.   Skin:     General: Skin is warm and dry.      Capillary Refill: Capillary refill takes less than 2 seconds.   Neurological:      General: No focal deficit present.      Mental Status: He is alert and oriented to person, place, and time. Mental status is at baseline.           Significant Labs: All pertinent labs within the past 24 hours have been reviewed.  CBC:   Recent Labs   Lab 06/04/23 0441 06/05/23  0544 06/05/23  0751   WBC 5.70 2.32* 4.99   HGB 13.0* 23.5* 11.9*   HCT 35.8* 63.5* 32.8*   * 43* 148*     CMP:   Recent Labs   Lab 06/04/23 0441 06/05/23  0544   * 133*   K 4.3 3.1*   CL 93* 94*   CO2 25 27    83   BUN 19 18   CREATININE 1.2 1.2   CALCIUM 9.4 8.9   PROT 6.3 5.7*   ALBUMIN 3.1* 3.0*   BILITOT 1.4* 1.1*   ALKPHOS 67 67   AST 55* 39   ALT 48* 42   ANIONGAP 14 12         Significant Imaging: I have reviewed all pertinent imaging results/findings within the past 24 hours.

## 2023-06-05 NOTE — PROGRESS NOTES
[facility]  Cardiology  Progress Note    Patient Name: Michael Hopper  MRN: [id number]  Admission Date: 6/2/2023  Hospital Length of Stay: 0 days  Code Status: No Order   Attending Physician: Cesia Phan MD   Primary Care Physician: Nkechi Madrid NP  Expected Discharge Date:   Principal Problem:Serum total bilirubin elevated    Subjective:     Hospital Course:   6/5/23-Patient seen and examined in room. Patient denies CP today. Plan Right and LHC tomorrow.      Interval History: interval no CP. Plan LHC tomorrow.    Review of Systems   Constitutional: Negative for chills, diaphoresis, night sweats, weight gain and weight loss.   HENT:  Negative for congestion, hoarse voice, sore throat and stridor.    Eyes:  Negative for double vision and pain.   Cardiovascular:  Positive for chest pain. Negative for claudication, cyanosis, dyspnea on exertion, irregular heartbeat, leg swelling, near-syncope, orthopnea, palpitations, paroxysmal nocturnal dyspnea and syncope.   Respiratory:  Negative for cough, hemoptysis, shortness of breath, sleep disturbances due to breathing, snoring, sputum production and wheezing.    Endocrine: Negative for cold intolerance, heat intolerance and polydipsia.   Hematologic/Lymphatic: Negative for bleeding problem. Does not bruise/bleed easily.   Skin:  Negative for color change, dry skin and rash.   Musculoskeletal:  Negative for joint swelling and muscle cramps.   Gastrointestinal:  Negative for bloating, abdominal pain, constipation, diarrhea, dysphagia, melena, nausea and vomiting.   Genitourinary:  Negative for flank pain and urgency.   Neurological:  Negative for dizziness, focal weakness, headaches, light-headedness, loss of balance, seizures and weakness.   Psychiatric/Behavioral:  Negative for altered mental status and memory loss. The patient is not nervous/anxious.    Objective:     Vital Signs (Most Recent):  Temp: 97.3 °F (36.3 °C) (06/05/23 0735)  Pulse: (!) 58  (06/05/23 0735)  Resp: 18 (06/05/23 0735)  BP: 130/61 (06/05/23 0735)  SpO2: 100 % (06/05/23 0735) Vital Signs (24h Range):  Temp:  [97.3 °F (36.3 °C)-98.7 °F (37.1 °C)] 97.3 °F (36.3 °C)  Pulse:  [51-74] 58  Resp:  [17-18] 18  SpO2:  [97 %-100 %] 100 %  BP: (102-130)/(56-71) 130/61     Weight: 75.8 kg (167 lb)  Body mass index is 22.65 kg/m².     SpO2: 100 %         Intake/Output Summary (Last 24 hours) at 6/5/2023 1158  Last data filed at 6/5/2023 0803  Gross per 24 hour   Intake 1437.44 ml   Output --   Net 1437.44 ml       Lines/Drains/Airways       Peripheral Intravenous Line  Duration                  Peripheral IV - Single Lumen 06/04/23 1650 22 G Posterior;Right Forearm <1 day         Peripheral IV - Single Lumen 06/04/23 2300 22 G Anterior;Distal;Right Upper Arm <1 day                       Physical Exam  Eyes:      Pupils: Pupils are equal, round, and reactive to light.   Neck:      Trachea: No tracheal deviation.   Cardiovascular:      Rate and Rhythm: Normal rate and regular rhythm.      Pulses: Intact distal pulses.           Carotid pulses are 2+ on the right side and 2+ on the left side.       Radial pulses are 2+ on the right side and 2+ on the left side.        Femoral pulses are 2+ on the right side and 2+ on the left side.       Popliteal pulses are 2+ on the right side and 2+ on the left side.        Dorsalis pedis pulses are 2+ on the right side and 2+ on the left side.        Posterior tibial pulses are 2+ on the right side and 2+ on the left side.      Heart sounds: Normal heart sounds. No murmur heard.    No friction rub. No gallop.   Pulmonary:      Effort: Pulmonary effort is normal. No respiratory distress.      Breath sounds: Normal breath sounds. No stridor. No wheezing or rales.   Chest:      Chest wall: No tenderness.   Abdominal:      General: There is no distension.      Tenderness: There is no abdominal tenderness. There is no rebound.   Musculoskeletal:         General: No  tenderness.      Cervical back: Normal range of motion.   Skin:     General: Skin is warm and dry.   Neurological:      Mental Status: He is alert and oriented to person, place, and time.          Significant Labs: CMP   Recent Labs   Lab 06/04/23  0441 06/05/23  0544   * 133*   K 4.3 3.1*   CL 93* 94*   CO2 25 27    83   BUN 19 18   CREATININE 1.2 1.2   CALCIUM 9.4 8.9   PROT 6.3 5.7*   ALBUMIN 3.1* 3.0*   BILITOT 1.4* 1.1*   ALKPHOS 67 67   AST 55* 39   ALT 48* 42   ANIONGAP 14 12    and Troponin No results for input(s): TROPONINI in the last 48 hours.    Significant Imaging: Echocardiogram: Transthoracic echo (TTE) complete (Cupid Only):   Results for orders placed or performed during the hospital encounter of 06/02/23   Echo   Result Value Ref Range    BSA 1.96 m2    TDI SEPTAL 0.06 m/s    LV LATERAL E/E' RATIO 7.40 m/s    LV SEPTAL E/E' RATIO 6.17 m/s    LA WIDTH 3.20 cm    IVC diameter 1.91 cm    Left Ventricular Outflow Tract Mean Velocity 0.97 cm/s    Left Ventricular Outflow Tract Mean Gradient 4.25 mmHg    Pulmonary Valve Mean Velocity 0.77 m/s    TDI LATERAL 0.05 m/s    PV PEAK VELOCITY 1.02 cm/s    LVIDd 4.85 3.5 - 6.0 cm    IVS 1.53 (A) 0.6 - 1.1 cm    Posterior Wall 1.51 (A) 0.6 - 1.1 cm    Ao root annulus 3.54 cm    LVIDs 4.11 (A) 2.1 - 4.0 cm    FS 15 28 - 44 %    STJ 3.52 cm    Ascending aorta 3.61 cm    LV mass 314.33 g    RVDD 3.92 cm    TAPSE 2.30 cm    RV S' 0.02 cm/s    Left Ventricle Relative Wall Thickness 0.62 cm    AV mean gradient 8 mmHg    AV valve area 3.28 cm2    AV Velocity Ratio 0.70     AV index (prosthetic) 0.79     E/A ratio 0.80     Mean e' 0.06 m/s    E wave deceleration time 294.04 msec    IVRT 159.85 msec    Pulm vein S/D ratio 1.81     LVOT diameter 2.30 cm    LVOT area 4.2 cm2    LVOT peak roberto 1.35 m/s    LVOT peak VTI 24.40 cm    Ao peak roberto 1.94 m/s    Ao VTI 30.9 cm    RVOT peak roberto 0.76 m/s    RVOT peak VTI 16.1 cm    LVOT stroke volume 101.32 cm3    AV peak  gradient 15 mmHg    PV mean gradient 1.21 mmHg    E/E' ratio 6.73 m/s    MV Peak E Stanley 0.37 m/s    TR Max Stanley 2.05 m/s    MV Peak A Stanley 0.46 m/s    PV Peak S Stanley 0.47 m/s    PV Peak D Stanley 0.26 m/s    LV Systolic Volume 74.60 mL    LV Systolic Volume Index 37.9 mL/m2    LV Diastolic Volume 110.03 mL    LV Diastolic Volume Index 55.85 mL/m2    LV Mass Index 160 g/m2    RA Major Axis 4.58 cm    Left Atrium Minor Axis 4.30 cm    Left Atrium Major Axis 4.29 cm    Triscuspid Valve Regurgitation Peak Gradient 17 mmHg    LA Volume Index (Mod) 17.6 mL/m2    LA volume (mod) 34.68 cm3    Right Atrial Pressure (from IVC) 3 mmHg    EF 20 %    TV rest pulmonary artery pressure 20 mmHg    Narrative    · The left ventricle is mildly enlarged with concentric hypertrophy and   severely decreased systolic function.  · The estimated ejection fraction is 20%.  · Grade I left ventricular diastolic dysfunction.  · There are segmental left ventricular wall motion abnormalities.  · Normal right ventricular size with normal right ventricular systolic   function.  · Normal central venous pressure (3 mmHg).  · The estimated PA systolic pressure is 20 mmHg.  · A small spherical solid left ventricular thrombus is possible. The   thrombus is fixed and located in the apex.        Assessment and Plan:     Brief HPI: Plan RLHC tomorrow    * Serum total bilirubin elevated  S/p abd u/s - f/u with GI/hepatology     Left ventricular apical thrombus  Cont heparin drip   Rec anticoag upon DC x min 3 months with repeat ECHO     Acute on chronic combined systolic and diastolic congestive heart failure  Not on diuretics now due to n/v  Monitor volume status, BNP ordered   Will start OMT once n/v resolves and renal function stable   Discuss Lifevest upon DC      NSTEMI (non-ST elevated myocardial infarction)  Cont ACS tx - asa, statin, bb, heparin drip  R/LHC Monday with Dr. French pending stable renal function  Reg GI eval for N/V in case pt has PCI needing  to remain on DAPT    Hypokalemia  Replete K > 4, Mg > 2    Acute renal failure  S/p IVF improving    Elevated troponin  See plan for NSTEMI    Vomiting  Rec GI eval if no improvement     Hypertension  Titrate meds         VTE Risk Mitigation (From admission, onward)         Ordered     heparin 25,000 units in dextrose 5% (100 units/ml) IV bolus from bag - ADDITIONAL PRN BOLUS - 60 units/kg (max bolus 4000 units)  As needed (PRN)        Question:  Heparin Infusion Adjustment (DO NOT MODIFY ANSWER)  Answer:  [email or web address]\[email or web address]\Images\Pharmacy\HeparinInfusions\heparin LOW INTENSITY nomogram for [facility] [id number]    06/03/23 0858     heparin 25,000 units in dextrose 5% (100 units/ml) IV bolus from bag - ADDITIONAL PRN BOLUS - 30 units/kg (max bolus 4000 units)  As needed (PRN)        Question:  Heparin Infusion Adjustment (DO NOT MODIFY ANSWER)  Answer:  [email or web address]\Images\Pharmacy\HeparinInfusions\heparin LOW INTENSITY nomogram for [facility] [id number]    06/03/23 0858     heparin 25,000 units in dextrose 5% 250 mL (100 units/mL) infusion LOW INTENSITY nomogram - [facility]  Continuous        Question Answer Comment   Heparin Infusion Adjustment (DO NOT MODIFY ANSWER) [email or web address]\epic\Images\Pharmacy\HeparinInfusions\heparin LOW INTENSITY nomogram for [facility] [id number]    Begin at (in units/kg/hr) 12        06/03/23 0858                Glenroy Nolen MD  Cardiology  [facility]

## 2023-06-05 NOTE — ASSESSMENT & PLAN NOTE
-cardiology following  -thrombus fixed and located and apex per echo  -heparin infusion continued for ACS  -echo results reviewed

## 2023-06-05 NOTE — ASSESSMENT & PLAN NOTE
-resolved  Likely related to IVVD r/t NV  Improving with IVF  Creatinine currently 1.2 from 1.7.  Continue to follow

## 2023-06-05 NOTE — ASSESSMENT & PLAN NOTE
-noted in the setting of NSTEMI  -cardiology following  -R/LHC planned for tomorrow  -electrolytes replaced

## 2023-06-05 NOTE — ASSESSMENT & PLAN NOTE
EKG reviewed by cardiology and unchanged  Suspect demand ischemia r/t vomiting, renal failure, ?GI   Trending down  Check echocardiogram  Cardiology consulted    06/4/23  -ECHO showed left ventricle is mildly enlarged with severely decreased systolic function. Ejection fraction is 20%. There is grade I diastolic dysfunction consistent with impaired relaxation. There is a small spherical solid possible thrombus. The thrombus is fixed and located in the apex. There are segmental wall motion abnormalities.   -He was started on heparin drip for ACS. Will need LHC once renal function improves. He will likely have R/LHC on Monday with Dr. Frecnh.   -6/5/23- echo results and need for LifeVest discussed with patient per Cardiology. R/LHC planned for tomorrow per Dr. French- heparin infusion continued-serial results reviewed with downward trend

## 2023-06-05 NOTE — PROGRESS NOTES
"[facility] Medicine  Progress Note    Patient Name: Michael Hopper  MRN: [id number]  Patient Class: IP- Inpatient   Admission Date: 6/2/2023  Length of Stay: 0 days  Attending Physician: Cesia Phan MD  Primary Care Provider: Nkechi Madrid NP        Subjective:     Principal Problem:Serum total bilirubin elevated        HPI:  Michael Hopper is a 57 year old male with history of CAD and hypertension who presents to ED for further evaluation of nausea and vomiting that began three days ago. He came to the ED due dizziness and lightheadedness upon stand and "cloudy thinking". He admits to some generalized abdominal pain. His last BM was one week ago. He denies fever, chest pain, or palpitations. He denies opioid use.     In ED, CXR negative. Labs revealed elevated liver enzymes and elevated Total Bilirubin. Abdominal ultrasound and lipase are pending. Initial troponin 0.159, trending down 0.131. Case has been discussed with  Cardiology. Patient will be placed in observation under the care of hospital medicine.       Overview/Hospital Course:  Michael Hopper is a 57  year old male place on observation for elevated t.bilirubin. Abdominal US showed sludge filled gallbladder, no gallstones. No evidence of cholecystitis. No biliary ductal obstruction. Fatty liver. T. Bilirubin and liver enzymes improving with IVF. Cardiology consulted due to elevated troponin. ECHO showed left ventricle is mildly enlarged with severely decreased systolic function. Ejection fraction is 20%. There is grade I diastolic dysfunction consistent with impaired relaxation. There is a small spherical solid possible thrombus. The thrombus is fixed and located in the apex. There are segmental wall motion abnormalities. He was started on heparin drip for ACS. Will need LHC once renal function improves. On 6/5/23, R/LHC on 6/6/23 with Dr. French. Heparin infusion and current plan of care continued. Hypokalemia noted with K added to " IVF. Kidney function normalized. H/H stable upon repeat evaluation. T. Bili improved and LFTs normalized. Pt verbalized symptom improvement upon exam with N/V resolved. ASA, Statin, and Plavix continued- beta blocker held secondary to bradycardia- will initiate at lower dose as appropriate. Lifevest upon discharge discussed with patient per Cardiology.          Interval History: pt in bed upon exam and denies CP, dyspnea, N/V, and all other cardiac symptoms. R/LHC planned for tomorrow and NPO after MN. Need for life vest discussed per Cardiology. Heparin continued. Hypokalemia noted- IVF w/K infusing. H/H stable. T. Bili trending down with LFTs normalized.     Review of Systems   Constitutional:  Negative for chills, diaphoresis, fatigue and fever.   HENT:  Negative for drooling, ear pain, rhinorrhea and sore throat.    Eyes: Negative.    Respiratory:  Negative for cough, shortness of breath and wheezing.    Cardiovascular:  Negative for palpitations and leg swelling.   Gastrointestinal:  Negative for abdominal pain, constipation, diarrhea, nausea and vomiting.   Endocrine: Negative.    Genitourinary:  Negative for dysuria, hematuria and urgency.   Musculoskeletal: Negative.    Skin:  Negative for color change and wound.   Allergic/Immunologic: Negative.    Neurological:  Negative for dizziness, syncope and speech difficulty.   Hematological: Negative.    Psychiatric/Behavioral: Negative.     Objective:     Vital Signs (Most Recent):  Temp: 98.2 °F (36.8 °C) (06/05/23 1210)  Pulse: 62 (06/05/23 1210)  Resp: 18 (06/05/23 1210)  BP: (!) 108/56 (06/05/23 1210)  SpO2: 100 % (06/05/23 1210) Vital Signs (24h Range):  Temp:  [97.3 °F (36.3 °C)-98.7 °F (37.1 °C)] 98.2 °F (36.8 °C)  Pulse:  [51-74] 62  Resp:  [17-18] 18  SpO2:  [97 %-100 %] 100 %  BP: (102-130)/(56-71) 108/56     Weight: 75.8 kg (167 lb)  Body mass index is 22.65 kg/m².    Intake/Output Summary (Last 24 hours) at 6/5/2023 1623  Last data filed at 6/5/2023  0803  Gross per 24 hour   Intake 1437.44 ml   Output --   Net 1437.44 ml         Physical Exam  Vitals and nursing note reviewed.   Constitutional:       General: He is not in acute distress.     Appearance: He is well-developed.   HENT:      Head: Normocephalic and atraumatic.      Mouth/Throat:      Mouth: Mucous membranes are moist.   Eyes:      Extraocular Movements: Extraocular movements intact.      Conjunctiva/sclera: Conjunctivae normal.   Cardiovascular:      Rate and Rhythm: Normal rate and regular rhythm.      Heart sounds: Normal heart sounds.   Pulmonary:      Effort: Pulmonary effort is normal. No respiratory distress.      Breath sounds: Normal breath sounds.   Abdominal:      General: Bowel sounds are normal. There is no distension.      Palpations: Abdomen is soft.      Tenderness: There is no abdominal tenderness.   Musculoskeletal:         General: Normal range of motion.      Cervical back: Normal range of motion and neck supple. No edema.   Skin:     General: Skin is warm and dry.      Capillary Refill: Capillary refill takes less than 2 seconds.   Neurological:      General: No focal deficit present.      Mental Status: He is alert and oriented to person, place, and time. Mental status is at baseline.           Significant Labs: All pertinent labs within the past 24 hours have been reviewed.  CBC:   Recent Labs   Lab 06/04/23  0441 06/05/23  0544 06/05/23  0751   WBC 5.70 2.32* 4.99   HGB 13.0* 23.5* 11.9*   HCT 35.8* 63.5* 32.8*   * 43* 148*     CMP:   Recent Labs   Lab 06/04/23  0441 06/05/23  0544   * 133*   K 4.3 3.1*   CL 93* 94*   CO2 25 27    83   BUN 19 18   CREATININE 1.2 1.2   CALCIUM 9.4 8.9   PROT 6.3 5.7*   ALBUMIN 3.1* 3.0*   BILITOT 1.4* 1.1*   ALKPHOS 67 67   AST 55* 39   ALT 48* 42   ANIONGAP 14 12         Significant Imaging: I have reviewed all pertinent imaging results/findings within the past 24 hours.      Assessment/Plan:      * Serum total bilirubin  elevated  In setting of 3 day history of nausea, vomiting, and food intolerance. AST/ALT are also elevated. Bilirubin in urine noted as well.   --will need to rule out hepatobiliary involvement  --check lipase and abdominal ultrasound now  --NPO for now  --IVFs  --antiemetics    6/4/23  -Abdominal US -- sludge filled gallbladder, no gallstones. No evidence of cholecystitis. No biliary ductal obstruction. Fatty liver.  -T. Bilirubin and liver enzymes improving with IVF  -6/5/23- T. Bili improving with LFTs normalized     Dehydration  -gentle hydration continued      Abnormal EKG  -noted in the setting of NSTEMI  -cardiology following  -R/LHC planned for tomorrow  -electrolytes replaced    Left ventricular apical thrombus  -cardiology following  -thrombus fixed and located and apex per echo  -heparin infusion continued for ACS  -echo results reviewed      Acute on chronic combined systolic and diastolic congestive heart failure  Patient is identified as having Combined Systolic and Diastolic heart failure that is Acute on chronic. CHF is currently controlled. Latest ECHO performed and demonstrates- Results for orders placed during the hospital encounter of 06/02/23    Echo    Interpretation Summary  · The left ventricle is mildly enlarged with concentric hypertrophy and severely decreased systolic function.  · The estimated ejection fraction is 20%.  · Grade I left ventricular diastolic dysfunction.  · There are segmental left ventricular wall motion abnormalities.  · Normal right ventricular size with normal right ventricular systolic function.  · Normal central venous pressure (3 mmHg).  · The estimated PA systolic pressure is 20 mmHg.  · A small spherical solid left ventricular thrombus is possible. The thrombus is fixed and located in the apex.  . Continue Beta Blocker and monitor clinical status closely. Monitor on telemetry. Patient is off CHF pathway.  Monitor strict Is&Os and daily weights.  Place on fluid  restriction of gentle hydration due to IVVD . Continue to stress to patient importance of self efficacy and  on diet for CHF. Last BNP reviewed- and noted below   Recent Labs   Lab 06/03/23  0913   BNP 71   .  -echo results reviewed and need for LifeVest upon discharge discussed with patient per Cardiology    NSTEMI (non-ST elevated myocardial infarction)  -patient admitted to med tele unit  -in the setting of medication noncompliance since 2017  -cardiology consulted  -Troponin elevated with serial results reviewed and downward trend noted  -heparin infusion continued  -R/LHC planned for tomorrow with Dr. French (cardiology)  -aspirin, Plavix, and statin continued  -Beta-blocker held due to bradycardia- we will resume at lower dose as appropriate  -electrolytes replaced as appropriate  -echo results reviewed with EF of 20%Grade I left ventricular diastolic dysfunction.There are segmental left ventricular wall motion abnormalities.        Hypokalemia  -K 3.1   -IVF w/ supplementation  Repleted  Magnesium wnl      Acute renal failure  -resolved  Likely related to IVVD r/t NV  Improving with IVF  Creatinine currently 1.2 from 1.7.  Continue to follow     Elevated troponin  EKG reviewed by cardiology and unchanged  Suspect demand ischemia r/t vomiting, renal failure, ?GI   Trending down  Check echocardiogram  Cardiology consulted    06/4/23  -ECHO showed left ventricle is mildly enlarged with severely decreased systolic function. Ejection fraction is 20%. There is grade I diastolic dysfunction consistent with impaired relaxation. There is a small spherical solid possible thrombus. The thrombus is fixed and located in the apex. There are segmental wall motion abnormalities.   -He was started on heparin drip for ACS. Will need LHC once renal function improves. He will likely have R/LHC on Monday with Dr. French.   -6/5/23- echo results and need for LifeVest discussed with patient per Cardiology. R/LHC planned for  tomorrow per Dr. French- heparin infusion continued-serial results reviewed with downward trend      Vomiting  --see plan for Elevated total Bilirubin.  Resolved.  -IV hydration  -gentle hydration      Hypertension  BP stable.  -metoprolol held secondary to bradycardia soft blood pressure-we will resume once appropriate      VTE Risk Mitigation (From admission, onward)         Ordered     heparin 25,000 units in dextrose 5% (100 units/ml) IV bolus from bag - ADDITIONAL PRN BOLUS - 60 units/kg (max bolus 4000 units)  As needed (PRN)        Question:  Heparin Infusion Adjustment (DO NOT MODIFY ANSWER)  Answer:  \[email or web address]\epic\Images\Pharmacy\HeparinInfusions\heparin LOW INTENSITY nomogram for [facility] [id number].pdf    06/03/23 0858     heparin 25,000 units in dextrose 5% (100 units/ml) IV bolus from bag - ADDITIONAL PRN BOLUS - 30 units/kg (max bolus 4000 units)  As needed (PRN)        Question:  Heparin Infusion Adjustment (DO NOT MODIFY ANSWER)  Answer:  [email or web address]\Images\Pharmacy\HeparinInfusions\heparin LOW INTENSITY nomogram for [facility] [id number].pdf    06/03/23 0858     heparin 25,000 units in dextrose 5% 250 mL (100 units/mL) infusion LOW INTENSITY nomogram - [facility]  Continuous        Question Answer Comment   Heparin Infusion Adjustment (DO NOT MODIFY ANSWER) \\[email or web address]\[email or web address]\Images\Pharmacy\HeparinInfusions\heparin LOW INTENSITY nomogram for [facility] [id number].pdf    Begin at (in units/kg/hr) 12        06/03/23 0858                Discharge Planning   GAUDENCIO:      Code Status: Not on file   Is the patient medically ready for discharge?:     Reason for patient still in hospital (select all that apply): Patient trending condition, Laboratory test, Treatment, Imaging and Consult recommendations  Discharge Plan A: Home                  Bhumika Purdy NP  Department of Hospital Medicine   [facility]

## 2023-06-05 NOTE — PLAN OF CARE
Discussed plan of care with pt. Pt verbalized understanding. No signs of acute distress. Bed alarm refused with bed at lowest position. Pain managed with ordered medication. Tele monitor [id number] in place. Call light within reach. Purposeful rounding Q2h.      Chart check complete.     Problem: Adult Inpatient Plan of Care  Goal: Plan of Care Review  Outcome: Ongoing, Progressing  Goal: Patient-Specific Goal (Individualized)  Outcome: Ongoing, Progressing  Goal: Absence of Hospital-Acquired Illness or Injury  Outcome: Ongoing, Progressing  Goal: Optimal Comfort and Wellbeing  Outcome: Ongoing, Progressing  Goal: Readiness for Transition of Care  Outcome: Ongoing, Progressing     Problem: Fluid and Electrolyte Imbalance (Acute Kidney Injury/Impairment)  Goal: Fluid and Electrolyte Balance  Outcome: Ongoing, Progressing     Problem: Oral Intake Inadequate (Acute Kidney Injury/Impairment)  Goal: Optimal Nutrition Intake  Outcome: Ongoing, Progressing     Problem: Renal Function Impairment (Acute Kidney Injury/Impairment)  Goal: Effective Renal Function  Outcome: Ongoing, Progressing     Problem: Pain Acute  Goal: Acceptable Pain Control and Functional Ability  Outcome: Ongoing, Progressing     Problem: Fall Injury Risk  Goal: Absence of Fall and Fall-Related Injury  Outcome: Ongoing, Progressing

## 2023-06-05 NOTE — PLAN OF CARE
Bed locked, in lowest position. Call bell in reach. Purposeful rounding done every two hours. Cardiac monitoring in use. Heparin infusing per MD order. Chart check complete. Will continue with plan of care.

## 2023-06-05 NOTE — ASSESSMENT & PLAN NOTE
BP stable.  -metoprolol held secondary to bradycardia soft blood pressure-we will resume once appropriate

## 2023-06-05 NOTE — ASSESSMENT & PLAN NOTE
In setting of 3 day history of nausea, vomiting, and food intolerance. AST/ALT are also elevated. Bilirubin in urine noted as well.   --will need to rule out hepatobiliary involvement  --check lipase and abdominal ultrasound now  --NPO for now  --IVFs  --antiemetics    6/4/23  -Abdominal US -- sludge filled gallbladder, no gallstones. No evidence of cholecystitis. No biliary ductal obstruction. Fatty liver.  -T. Bilirubin and liver enzymes improving with IVF  -6/5/23- T. Bili improving with LFTs normalized

## 2023-06-05 NOTE — HOSPITAL COURSE
6/5/23-Patient seen and examined in room. Patient denies CP today. Plan Right and LHC tomorrow.    6/6/23-Patient seen and examined in room. Post re stent of LAD and stable.  Continue all medications    6/7/23-Patient seen and examined in room. No complaints. Patient to take ASA for 2 weeks and then d/c. Patient to continue brilinta and coumadin with lovenox bridging.

## 2023-06-05 NOTE — PLAN OF CARE
[facility] - Med Surg  Initial Discharge Assessment       Primary Care Provider: Nkechi Madrid NP    Admission Diagnosis: Dehydration [E86.0]  Confusion [R41.0]  Abnormal EKG [R94.31]  Elevated troponin [R77.8]  CONNOR (acute kidney injury) [N17.9]    Admission Date: 6/2/2023  Expected Discharge Date: Per Attending     Transition of Care Barriers: None    Payor: MEDICAID / Plan: PENDING MEDICAID / Product Type: Government /     Extended Emergency Contact Information  Primary Emergency Contact: Raysa Samuel  Address: [address] [place] [address] [id number] [place] [address]  Home Phone: [phone]  Mobile Phone: [phone]  Relation: Mother    Discharge Plan A: Home         [facility] - [place], [place] - [address] [facility] [address]  Phone: [phone] Fax: [phone]      Initial Assessment (most recent)       Adult Discharge Assessment - 06/05/23 1249          Discharge Assessment    Assessment Type Discharge Planning Assessment     Confirmed/corrected address, phone number and insurance Yes     Confirmed Demographics Correct on Facesheet     Source of Information patient     Communicated GAUDENCIO with patient/caregiver Date not available/Unable to determine     Reason For Admission serum total bilirubin elevated     People in Home alone     Do you expect to return to your current living situation? Yes     Do you have help at home or someone to help you manage your care at home? No     Prior to hospitilization cognitive status: Alert/Oriented     Current cognitive status: Alert/Oriented     Home Accessibility stairs to enter home     Number of Stairs, Main Entrance three     Home Layout Able to live on 1st floor     Equipment Currently Used at Home none     Readmission within 30 days? No     Patient currently being followed by outpatient case management? No     Do you currently have service(s) that help you manage your care at home? No     Do you take  prescription medications? No     Who is going to help you get home at discharge? Family     How do you get to doctors appointments? car, drives self     Are you on dialysis? No     Do you take coumadin? No     Discharge Plan A Home     DME Needed Upon Discharge  none     Discharge Plan discussed with: Patient     Transition of Care Barriers None                      No d/c needs at this time.

## 2023-06-05 NOTE — SUBJECTIVE & OBJECTIVE
Interval History: interval no CP. Plan [facility] tomorrow.    Review of Systems   Constitutional: Negative for chills, diaphoresis, night sweats, weight gain and weight loss.   HENT:  Negative for congestion, hoarse voice, sore throat and stridor.    Eyes:  Negative for double vision and pain.   Cardiovascular:  Positive for chest pain. Negative for claudication, cyanosis, dyspnea on exertion, irregular heartbeat, leg swelling, near-syncope, orthopnea, palpitations, paroxysmal nocturnal dyspnea and syncope.   Respiratory:  Negative for cough, hemoptysis, shortness of breath, sleep disturbances due to breathing, snoring, sputum production and wheezing.    Endocrine: Negative for cold intolerance, heat intolerance and polydipsia.   Hematologic/Lymphatic: Negative for bleeding problem. Does not bruise/bleed easily.   Skin:  Negative for color change, dry skin and rash.   Musculoskeletal:  Negative for joint swelling and muscle cramps.   Gastrointestinal:  Negative for bloating, abdominal pain, constipation, diarrhea, dysphagia, melena, nausea and vomiting.   Genitourinary:  Negative for flank pain and urgency.   Neurological:  Negative for dizziness, focal weakness, headaches, light-headedness, loss of balance, seizures and weakness.   Psychiatric/Behavioral:  Negative for altered mental status and memory loss. The patient is not nervous/anxious.    Objective:     Vital Signs (Most Recent):  Temp: 97.3 °F (36.3 °C) (06/05/23 0735)  Pulse: (!) 58 (06/05/23 0735)  Resp: 18 (06/05/23 0735)  BP: 130/61 (06/05/23 0735)  SpO2: 100 % (06/05/23 0735) Vital Signs (24h Range):  Temp:  [97.3 °F (36.3 °C)-98.7 °F (37.1 °C)] 97.3 °F (36.3 °C)  Pulse:  [51-74] 58  Resp:  [17-18] 18  SpO2:  [97 %-100 %] 100 %  BP: (102-130)/(56-71) 130/61     Weight: 75.8 kg (167 lb)  Body mass index is 22.65 kg/m².     SpO2: 100 %         Intake/Output Summary (Last 24 hours) at 6/5/2023 1158  Last data filed at 6/5/2023 0803  Gross per 24 hour   Intake  1437.44 ml   Output --   Net 1437.44 ml       Lines/Drains/Airways       Peripheral Intravenous Line  Duration                  Peripheral IV - Single Lumen 06/04/23 1650 22 G Posterior;Right Forearm <1 day         Peripheral IV - Single Lumen 06/04/23 2300 22 G Anterior;Distal;Right Upper Arm <1 day                       Physical Exam  Eyes:      Pupils: Pupils are equal, round, and reactive to light.   Neck:      Trachea: No tracheal deviation.   Cardiovascular:      Rate and Rhythm: Normal rate and regular rhythm.      Pulses: Intact distal pulses.           Carotid pulses are 2+ on the right side and 2+ on the left side.       Radial pulses are 2+ on the right side and 2+ on the left side.        Femoral pulses are 2+ on the right side and 2+ on the left side.       Popliteal pulses are 2+ on the right side and 2+ on the left side.        Dorsalis pedis pulses are 2+ on the right side and 2+ on the left side.        Posterior tibial pulses are 2+ on the right side and 2+ on the left side.      Heart sounds: Normal heart sounds. No murmur heard.    No friction rub. No gallop.   Pulmonary:      Effort: Pulmonary effort is normal. No respiratory distress.      Breath sounds: Normal breath sounds. No stridor. No wheezing or rales.   Chest:      Chest wall: No tenderness.   Abdominal:      General: There is no distension.      Tenderness: There is no abdominal tenderness. There is no rebound.   Musculoskeletal:         General: No tenderness.      Cervical back: Normal range of motion.   Skin:     General: Skin is warm and dry.   Neurological:      Mental Status: He is alert and oriented to person, place, and time.          Significant Labs: CMP   Recent Labs   Lab 06/04/23  0441 06/05/23  0544   * 133*   K 4.3 3.1*   CL 93* 94*   CO2 25 27    83   BUN 19 18   CREATININE 1.2 1.2   CALCIUM 9.4 8.9   PROT 6.3 5.7*   ALBUMIN 3.1* 3.0*   BILITOT 1.4* 1.1*   ALKPHOS 67 67   AST 55* 39   ALT 48* 42   ANIONGAP  14 12    and Troponin No results for input(s): TROPONINI in the last 48 hours.    Significant Imaging: Echocardiogram: Transthoracic echo (TTE) complete (Cupid Only):   Results for orders placed or performed during the hospital encounter of 06/02/23   Echo   Result Value Ref Range    BSA 1.96 m2    TDI SEPTAL 0.06 m/s    LV LATERAL E/E' RATIO 7.40 m/s    LV SEPTAL E/E' RATIO 6.17 m/s    LA WIDTH 3.20 cm    IVC diameter 1.91 cm    Left Ventricular Outflow Tract Mean Velocity 0.97 cm/s    Left Ventricular Outflow Tract Mean Gradient 4.25 mmHg    Pulmonary Valve Mean Velocity 0.77 m/s    TDI LATERAL 0.05 m/s    PV PEAK VELOCITY 1.02 cm/s    LVIDd 4.85 3.5 - 6.0 cm    IVS 1.53 (A) 0.6 - 1.1 cm    Posterior Wall 1.51 (A) 0.6 - 1.1 cm    Ao root annulus 3.54 cm    LVIDs 4.11 (A) 2.1 - 4.0 cm    FS 15 28 - 44 %    STJ 3.52 cm    Ascending aorta 3.61 cm    LV mass 314.33 g    RVDD 3.92 cm    TAPSE 2.30 cm    RV S' 0.02 cm/s    Left Ventricle Relative Wall Thickness 0.62 cm    AV mean gradient 8 mmHg    AV valve area 3.28 cm2    AV Velocity Ratio 0.70     AV index (prosthetic) 0.79     E/A ratio 0.80     Mean e' 0.06 m/s    E wave deceleration time 294.04 msec    IVRT 159.85 msec    Pulm vein S/D ratio 1.81     LVOT diameter 2.30 cm    LVOT area 4.2 cm2    LVOT peak stanley 1.35 m/s    LVOT peak VTI 24.40 cm    Ao peak stanley 1.94 m/s    Ao VTI 30.9 cm    RVOT peak stanley 0.76 m/s    RVOT peak VTI 16.1 cm    LVOT stroke volume 101.32 cm3    AV peak gradient 15 mmHg    PV mean gradient 1.21 mmHg    E/E' ratio 6.73 m/s    MV Peak E Stanley 0.37 m/s    TR Max Stanley 2.05 m/s    MV Peak A Stanley 0.46 m/s    PV Peak S Stanley 0.47 m/s    PV Peak D Stanley 0.26 m/s    LV Systolic Volume 74.60 mL    LV Systolic Volume Index 37.9 mL/m2    LV Diastolic Volume 110.03 mL    LV Diastolic Volume Index 55.85 mL/m2    LV Mass Index 160 g/m2    RA Major Axis 4.58 cm    Left Atrium Minor Axis 4.30 cm    Left Atrium Major Axis 4.29 cm    Triscuspid Valve Regurgitation  Peak Gradient 17 mmHg    LA Volume Index (Mod) 17.6 mL/m2    LA volume (mod) 34.68 cm3    Right Atrial Pressure (from IVC) 3 mmHg    EF 20 %    TV rest pulmonary artery pressure 20 mmHg    Narrative    · The left ventricle is mildly enlarged with concentric hypertrophy and   severely decreased systolic function.  · The estimated ejection fraction is 20%.  · Grade I left ventricular diastolic dysfunction.  · There are segmental left ventricular wall motion abnormalities.  · Normal right ventricular size with normal right ventricular systolic   function.  · Normal central venous pressure (3 mmHg).  · The estimated PA systolic pressure is 20 mmHg.  · A small spherical solid left ventricular thrombus is possible. The   thrombus is fixed and located in the apex.

## 2023-06-05 NOTE — ASSESSMENT & PLAN NOTE
-patient admitted to med tele unit  -in the setting of medication noncompliance since 2017  -cardiology consulted  -Troponin elevated with serial results reviewed and downward trend noted  -heparin infusion continued  -R/LHC planned for tomorrow with Dr. French (cardiology)  -aspirin, Plavix, and statin continued  -Beta-blocker held due to bradycardia- we will resume at lower dose as appropriate  -electrolytes replaced as appropriate  -echo results reviewed with EF of 20%Grade I left ventricular diastolic dysfunction.There are segmental left ventricular wall motion abnormalities.

## 2023-06-06 LAB
ALBUMIN SERPL BCP-MCNC: 2.8 G/DL (ref 3.5–5.2)
ALLENS TEST: ABNORMAL
ALP SERPL-CCNC: 90 U/L (ref 55–135)
ALT SERPL W/O P-5'-P-CCNC: 36 U/L (ref 10–44)
ANION GAP SERPL CALC-SCNC: 10 MMOL/L (ref 8–16)
APTT PPP: 48.4 SEC (ref 21–32)
AST SERPL-CCNC: 30 U/L (ref 10–40)
BASOPHILS # BLD AUTO: 0.03 K/UL (ref 0–0.2)
BASOPHILS NFR BLD: 0.9 % (ref 0–1.9)
BILIRUB SERPL-MCNC: 0.9 MG/DL (ref 0.1–1)
BUN SERPL-MCNC: 17 MG/DL (ref 6–20)
CALCIUM SERPL-MCNC: 8.9 MG/DL (ref 8.7–10.5)
CHLORIDE SERPL-SCNC: 97 MMOL/L (ref 95–110)
CO2 SERPL-SCNC: 28 MMOL/L (ref 23–29)
CREAT SERPL-MCNC: 1 MG/DL (ref 0.5–1.4)
DELSYS: ABNORMAL
DIFFERENTIAL METHOD: ABNORMAL
EOSINOPHIL # BLD AUTO: 0 K/UL (ref 0–0.5)
EOSINOPHIL NFR BLD: 0.9 % (ref 0–8)
ERYTHROCYTE [DISTWIDTH] IN BLOOD BY AUTOMATED COUNT: 11.7 % (ref 11.5–14.5)
EST. GFR  (NO RACE VARIABLE): >60 ML/MIN/1.73 M^2
FIO2: 28
FLOW: 2
GLUCOSE SERPL-MCNC: 80 MG/DL (ref 70–110)
HCO3 UR-SCNC: 28.4 MMOL/L (ref 24–28)
HCT VFR BLD AUTO: 31.9 % (ref 40–54)
HGB BLD-MCNC: 11.1 G/DL (ref 14–18)
IMM GRANULOCYTES # BLD AUTO: 0.01 K/UL (ref 0–0.04)
IMM GRANULOCYTES NFR BLD AUTO: 0.3 % (ref 0–0.5)
LYMPHOCYTES # BLD AUTO: 1 K/UL (ref 1–4.8)
LYMPHOCYTES NFR BLD: 28.6 % (ref 18–48)
MAGNESIUM SERPL-MCNC: 1.9 MG/DL (ref 1.6–2.6)
MCH RBC QN AUTO: 33.9 PG (ref 27–31)
MCHC RBC AUTO-ENTMCNC: 34.8 G/DL (ref 32–36)
MCV RBC AUTO: 98 FL (ref 82–98)
MODE: ABNORMAL
MONOCYTES # BLD AUTO: 0.4 K/UL (ref 0.3–1)
MONOCYTES NFR BLD: 12.1 % (ref 4–15)
NEUTROPHILS # BLD AUTO: 2 K/UL (ref 1.8–7.7)
NEUTROPHILS NFR BLD: 57.2 % (ref 38–73)
NRBC BLD-RTO: 0 /100 WBC
PCO2 BLDA: 44.4 MMHG (ref 35–45)
PH SMN: 7.41 [PH] (ref 7.35–7.45)
PLATELET # BLD AUTO: 137 K/UL (ref 150–450)
PMV BLD AUTO: 9.5 FL (ref 9.2–12.9)
PO2 BLDA: 38 MMHG (ref 80–100)
POC ACTIVATED CLOTTING TIME K: 209 SEC (ref 74–137)
POC ACTIVATED CLOTTING TIME K: 239 SEC (ref 74–137)
POC ACTIVATED CLOTTING TIME K: 239 SEC (ref 74–137)
POC BE: 4 MMOL/L
POC SATURATED O2: 72 % (ref 95–100)
POCT GLUCOSE: 96 MG/DL (ref 70–110)
POTASSIUM SERPL-SCNC: 3.7 MMOL/L (ref 3.5–5.1)
PROT SERPL-MCNC: 5.3 G/DL (ref 6–8.4)
RBC # BLD AUTO: 3.27 M/UL (ref 4.6–6.2)
SAMPLE: ABNORMAL
SITE: ABNORMAL
SODIUM SERPL-SCNC: 135 MMOL/L (ref 136–145)
WBC # BLD AUTO: 3.46 K/UL (ref 3.9–12.7)

## 2023-06-06 PROCEDURE — 83735 ASSAY OF MAGNESIUM: CPT | Performed by: INTERNAL MEDICINE

## 2023-06-06 PROCEDURE — 92928 PR STENT: ICD-10-PCS | Mod: LD,,, | Performed by: INTERNAL MEDICINE

## 2023-06-06 PROCEDURE — 25000003 PHARM REV CODE 250: Performed by: PHYSICIAN ASSISTANT

## 2023-06-06 PROCEDURE — 93010 EKG 12-LEAD: ICD-10-PCS | Mod: ,,, | Performed by: INTERNAL MEDICINE

## 2023-06-06 PROCEDURE — C1751 CATH, INF, PER/CENT/MIDLINE: HCPCS | Performed by: INTERNAL MEDICINE

## 2023-06-06 PROCEDURE — 63600175 PHARM REV CODE 636 W HCPCS: Performed by: INTERNAL MEDICINE

## 2023-06-06 PROCEDURE — 37799 UNLISTED PX VASCULAR SURGERY: CPT

## 2023-06-06 PROCEDURE — 80053 COMPREHEN METABOLIC PANEL: CPT | Performed by: INTERNAL MEDICINE

## 2023-06-06 PROCEDURE — 93005 ELECTROCARDIOGRAM TRACING: CPT

## 2023-06-06 PROCEDURE — 25000003 PHARM REV CODE 250: Performed by: INTERNAL MEDICINE

## 2023-06-06 PROCEDURE — 85025 COMPLETE CBC W/AUTO DIFF WBC: CPT | Performed by: INTERNAL MEDICINE

## 2023-06-06 PROCEDURE — C9600 PERC DRUG-EL COR STENT SING: HCPCS | Mod: LD | Performed by: INTERNAL MEDICINE

## 2023-06-06 PROCEDURE — 25500020 PHARM REV CODE 255: Performed by: INTERNAL MEDICINE

## 2023-06-06 PROCEDURE — 99233 SBSQ HOSP IP/OBS HIGH 50: CPT | Mod: ,,, | Performed by: INTERNAL MEDICINE

## 2023-06-06 PROCEDURE — 92978 PR IVUS, CORONARY, 1ST VESSEL: ICD-10-PCS | Mod: 26,LD,, | Performed by: INTERNAL MEDICINE

## 2023-06-06 PROCEDURE — C1753 CATH, INTRAVAS ULTRASOUND: HCPCS | Performed by: INTERNAL MEDICINE

## 2023-06-06 PROCEDURE — 92928 PRQ TCAT PLMT NTRAC ST 1 LES: CPT | Mod: LD,,, | Performed by: INTERNAL MEDICINE

## 2023-06-06 PROCEDURE — C1874 STENT, COATED/COV W/DEL SYS: HCPCS | Performed by: INTERNAL MEDICINE

## 2023-06-06 PROCEDURE — 93460 R&L HRT ART/VENTRICLE ANGIO: CPT | Mod: 26,59,51, | Performed by: INTERNAL MEDICINE

## 2023-06-06 PROCEDURE — 99152 MOD SED SAME PHYS/QHP 5/>YRS: CPT | Mod: ,,, | Performed by: INTERNAL MEDICINE

## 2023-06-06 PROCEDURE — 99152 PR MOD CONSCIOUS SEDATION, SAME PHYS, 5+ YRS, FIRST 15 MIN: ICD-10-PCS | Mod: ,,, | Performed by: INTERNAL MEDICINE

## 2023-06-06 PROCEDURE — 92978 ENDOLUMINL IVUS OCT C 1ST: CPT | Mod: LD | Performed by: INTERNAL MEDICINE

## 2023-06-06 PROCEDURE — 92978 ENDOLUMINL IVUS OCT C 1ST: CPT | Mod: 26,LD,, | Performed by: INTERNAL MEDICINE

## 2023-06-06 PROCEDURE — 85347 COAGULATION TIME ACTIVATED: CPT | Performed by: INTERNAL MEDICINE

## 2023-06-06 PROCEDURE — 99900035 HC TECH TIME PER 15 MIN (STAT)

## 2023-06-06 PROCEDURE — 85730 THROMBOPLASTIN TIME PARTIAL: CPT | Performed by: INTERNAL MEDICINE

## 2023-06-06 PROCEDURE — 21400001 HC TELEMETRY ROOM

## 2023-06-06 PROCEDURE — 25000003 PHARM REV CODE 250: Performed by: NURSE PRACTITIONER

## 2023-06-06 PROCEDURE — 99152 MOD SED SAME PHYS/QHP 5/>YRS: CPT | Performed by: INTERNAL MEDICINE

## 2023-06-06 PROCEDURE — 99153 MOD SED SAME PHYS/QHP EA: CPT | Performed by: INTERNAL MEDICINE

## 2023-06-06 PROCEDURE — 93460 PR CATH PLACE/CORON ANGIO, IMG SUPER/INTERP,R&L HRT CATH, L HRT VENTRIC: ICD-10-PCS | Mod: 26,59,51, | Performed by: INTERNAL MEDICINE

## 2023-06-06 PROCEDURE — C1769 GUIDE WIRE: HCPCS | Performed by: INTERNAL MEDICINE

## 2023-06-06 PROCEDURE — 99233 PR SUBSEQUENT HOSPITAL CARE,LEVL III: ICD-10-PCS | Mod: ,,, | Performed by: INTERNAL MEDICINE

## 2023-06-06 PROCEDURE — C1887 CATHETER, GUIDING: HCPCS | Performed by: INTERNAL MEDICINE

## 2023-06-06 PROCEDURE — C1894 INTRO/SHEATH, NON-LASER: HCPCS | Performed by: INTERNAL MEDICINE

## 2023-06-06 PROCEDURE — 93460 R&L HRT ART/VENTRICLE ANGIO: CPT | Performed by: INTERNAL MEDICINE

## 2023-06-06 PROCEDURE — C1725 CATH, TRANSLUMIN NON-LASER: HCPCS | Performed by: INTERNAL MEDICINE

## 2023-06-06 PROCEDURE — 93010 ELECTROCARDIOGRAM REPORT: CPT | Mod: ,,, | Performed by: INTERNAL MEDICINE

## 2023-06-06 DEVICE — EVEROLIMUS-ELUTING PLATINUM CHROMIUM CORONARY STENT SYSTEM
Type: IMPLANTABLE DEVICE | Site: HEART | Status: FUNCTIONAL
Brand: SYNERGY™ XD

## 2023-06-06 RX ORDER — HEPARIN SODIUM 1000 [USP'U]/ML
INJECTION, SOLUTION INTRAVENOUS; SUBCUTANEOUS
Status: DISCONTINUED | OUTPATIENT
Start: 2023-06-06 | End: 2023-06-06

## 2023-06-06 RX ORDER — VERAPAMIL HYDROCHLORIDE 2.5 MG/ML
INJECTION, SOLUTION INTRAVENOUS
Status: DISCONTINUED | OUTPATIENT
Start: 2023-06-06 | End: 2023-06-06

## 2023-06-06 RX ORDER — METOPROLOL TARTRATE 25 MG/1
25 TABLET, FILM COATED ORAL 2 TIMES DAILY
Status: DISCONTINUED | OUTPATIENT
Start: 2023-06-06 | End: 2023-06-08

## 2023-06-06 RX ORDER — SODIUM CHLORIDE 9 MG/ML
INJECTION, SOLUTION INTRAVENOUS CONTINUOUS
Status: DISCONTINUED | OUTPATIENT
Start: 2023-06-06 | End: 2023-06-06

## 2023-06-06 RX ORDER — ACETAMINOPHEN 325 MG/1
650 TABLET ORAL EVERY 4 HOURS PRN
Status: DISCONTINUED | OUTPATIENT
Start: 2023-06-06 | End: 2023-06-08 | Stop reason: HOSPADM

## 2023-06-06 RX ORDER — LIDOCAINE HYDROCHLORIDE 20 MG/ML
INJECTION, SOLUTION INFILTRATION; PERINEURAL
Status: DISCONTINUED | OUTPATIENT
Start: 2023-06-06 | End: 2023-06-06

## 2023-06-06 RX ORDER — ATORVASTATIN CALCIUM 40 MG/1
80 TABLET, FILM COATED ORAL DAILY
Status: DISCONTINUED | OUTPATIENT
Start: 2023-06-07 | End: 2023-06-08 | Stop reason: HOSPADM

## 2023-06-06 RX ORDER — ONDANSETRON 8 MG/1
8 TABLET, ORALLY DISINTEGRATING ORAL EVERY 8 HOURS PRN
Status: DISCONTINUED | OUTPATIENT
Start: 2023-06-06 | End: 2023-06-08 | Stop reason: HOSPADM

## 2023-06-06 RX ORDER — NITROGLYCERIN 5 MG/ML
INJECTION, SOLUTION INTRAVENOUS
Status: DISCONTINUED | OUTPATIENT
Start: 2023-06-06 | End: 2023-06-06

## 2023-06-06 RX ORDER — MIDAZOLAM HYDROCHLORIDE 1 MG/ML
INJECTION, SOLUTION INTRAMUSCULAR; INTRAVENOUS
Status: DISCONTINUED | OUTPATIENT
Start: 2023-06-06 | End: 2023-06-06

## 2023-06-06 RX ORDER — HYDRALAZINE HYDROCHLORIDE 20 MG/ML
10 INJECTION INTRAMUSCULAR; INTRAVENOUS EVERY 8 HOURS PRN
Status: DISCONTINUED | OUTPATIENT
Start: 2023-06-06 | End: 2023-06-08 | Stop reason: HOSPADM

## 2023-06-06 RX ORDER — FENTANYL CITRATE 50 UG/ML
INJECTION, SOLUTION INTRAMUSCULAR; INTRAVENOUS
Status: DISCONTINUED | OUTPATIENT
Start: 2023-06-06 | End: 2023-06-06

## 2023-06-06 RX ADMIN — SODIUM CHLORIDE: 9 INJECTION, SOLUTION INTRAVENOUS at 07:06

## 2023-06-06 RX ADMIN — ATORVASTATIN CALCIUM 10 MG: 10 TABLET, FILM COATED ORAL at 09:06

## 2023-06-06 RX ADMIN — CLOPIDOGREL BISULFATE 75 MG: 75 TABLET ORAL at 09:06

## 2023-06-06 RX ADMIN — SODIUM CHLORIDE: 9 INJECTION, SOLUTION INTRAVENOUS at 04:06

## 2023-06-06 RX ADMIN — METOPROLOL TARTRATE 25 MG: 25 TABLET, FILM COATED ORAL at 04:06

## 2023-06-06 RX ADMIN — TICAGRELOR 90 MG: 90 TABLET ORAL at 11:06

## 2023-06-06 RX ADMIN — ASPIRIN 81 MG: 81 TABLET, COATED ORAL at 09:06

## 2023-06-06 RX ADMIN — SODIUM CHLORIDE AND POTASSIUM CHLORIDE: 9; 1.49 INJECTION, SOLUTION INTRAVENOUS at 03:06

## 2023-06-06 NOTE — NURSING TRANSFER
Nursing Transfer Note      6/6/2023 1520 Pt transferred to room 510 via bed. VSS. L radial arterial site WDL, L wrist immobilizer secure, dressing CDI. L brachial venous site WDL, dressing CDI. IVF's to pump, R AC IV  patent,  intact. Care to BIANCA Chandra.

## 2023-06-06 NOTE — DISCHARGE INSTRUCTIONS
"    1. DIET: It is advisable for you to follow a diet that limits the intake of salt, sugar, saturated fats and cholesterol.     2. DRIVING: Due to sedation you received during your procedure, DO NOT drive or operate machinery for 24 hours. Avoid making critical decisions or signing legal documents until tomorrow.    3. ACTIVITY: AVOID activities that require bending of the affected arm/wrist for 3 days and submerging the site in water for 3 days. REMOVE the dressing the day after  the procedure, and shower.  Apply a bandaid to site after shower.  WEAR wrist immobilizer until tomorrow night.    You may RESUME your normal activities or prescribed exercise program as instructed by your physician after 3 days.              4. WOUND CARE: It is not unusual to have a small amount of bruising to appear at or near the puncture site. It is also common to have a tender "knot" develop beneath the skin at the puncture site of the wrist/arm. This is usually scar tissue and is not a cause for concern or alarm. This tender knot may take several weeks to fully resolve. The bruise will usually spread over several days. However, if the lump gets bigger, call your doctor immediately.    5. DISCOMFORT: For general discomfort at the puncture site, you may take 1 or 2 Acetaminophen (Tylenol) tablets every 4 - 6 hours as needed. (Do not take more than 4000 mg a day)    6. SIGNS AND SYMPTOMS TO REPORT:  Call your physician immediately if any of the following occur:                                            1. Loss of feeling, warmth or color to the affected arm/wrist                                                                                                          2. Mild beeding from the site                 3. Pain that is sudden, sharp or persistent in the affected arm/wrist                 4. Swelling or a change in "lump" size, increased redness or drainage at the puncture site                                                   "                             5. High fever (101 degrees or higher)    7. GO TO  THE EMERGENCY ROOM OR CALL 911 IF YOU HAVE: Chest pains or discomforts not relieved with 3 nitroglycerin doses (sublingual tablets or spray), numbness or severe pain of limb, if your limb becomes cold or discolored or if you develop uncontrolled bleeding from the puncture site (quickly apply firm, direct pressure above the site).

## 2023-06-06 NOTE — ASSESSMENT & PLAN NOTE
In setting of 3 day history of nausea, vomiting, and food intolerance. AST/ALT are also elevated. Bilirubin in urine noted as well.   --will need to rule out hepatobiliary involvement  --check lipase and abdominal ultrasound now  --NPO for now  --IVFs  --antiemetics    6/4/23  -Abdominal US -- sludge filled gallbladder, no gallstones. No evidence of cholecystitis. No biliary ductal obstruction. Fatty liver.  -T. Bilirubin and liver enzymes improving with IVF  -6/5/23- T. Bili improving with LFTs normalized   -6/6/23- normalized

## 2023-06-06 NOTE — SUBJECTIVE & OBJECTIVE
Interval History: repeat stent of LAD    Review of Systems   Constitutional: Positive for malaise/fatigue. Negative for chills, diaphoresis, night sweats, weight gain and weight loss.   HENT:  Negative for congestion, hoarse voice, sore throat and stridor.    Eyes:  Negative for double vision and pain.   Cardiovascular:  Negative for chest pain, claudication, cyanosis, dyspnea on exertion, irregular heartbeat, leg swelling, near-syncope, orthopnea, palpitations, paroxysmal nocturnal dyspnea and syncope.   Respiratory:  Negative for cough, hemoptysis, shortness of breath, sleep disturbances due to breathing, snoring, sputum production and wheezing.    Endocrine: Negative for cold intolerance, heat intolerance and polydipsia.   Hematologic/Lymphatic: Negative for bleeding problem. Does not bruise/bleed easily.   Skin:  Negative for color change, dry skin and rash.   Musculoskeletal:  Negative for joint swelling and muscle cramps.   Gastrointestinal:  Negative for bloating, abdominal pain, constipation, diarrhea, dysphagia, melena, nausea and vomiting.   Genitourinary:  Negative for flank pain and urgency.   Neurological:  Negative for dizziness, focal weakness, headaches, light-headedness, loss of balance, seizures and weakness.   Psychiatric/Behavioral:  Negative for altered mental status and memory loss. The patient is not nervous/anxious.    Objective:     Vital Signs (Most Recent):  Temp: 98.2 °F (36.8 °C) (06/06/23 0759)  Pulse: (!) 55 (06/06/23 1230)  Resp: 16 (06/06/23 1230)  BP: 103/61 (06/06/23 1230)  SpO2: 100 % (06/06/23 1230) Vital Signs (24h Range):  Temp:  [97.6 °F (36.4 °C)-98.5 °F (36.9 °C)] 98.2 °F (36.8 °C)  Pulse:  [55-83] 55  Resp:  [15-18] 16  SpO2:  [98 %-100 %] 100 %  BP: (103-125)/(56-71) 103/61     Weight: 75.8 kg (167 lb)  Body mass index is 22.65 kg/m².     SpO2: 100 %         Intake/Output Summary (Last 24 hours) at 6/6/2023 1239  Last data filed at 6/6/2023 1216  Gross per 24 hour   Intake  1288.96 ml   Output 500 ml   Net 788.96 ml       Lines/Drains/Airways       Peripheral Intravenous Line  Duration                  Peripheral IV - Single Lumen 06/04/23 1650 22 G Posterior;Right Forearm 1 day         Peripheral IV - Single Lumen 06/04/23 2300 22 G Anterior;Distal;Right Upper Arm 1 day                       Physical Exam  Eyes:      Pupils: Pupils are equal, round, and reactive to light.   Neck:      Trachea: No tracheal deviation.   Cardiovascular:      Rate and Rhythm: Normal rate and regular rhythm.      Pulses: Intact distal pulses.           Carotid pulses are 2+ on the right side and 2+ on the left side.       Radial pulses are 2+ on the right side and 2+ on the left side.        Femoral pulses are 2+ on the right side and 2+ on the left side.       Popliteal pulses are 2+ on the right side and 2+ on the left side.        Dorsalis pedis pulses are 2+ on the right side and 2+ on the left side.        Posterior tibial pulses are 2+ on the right side and 2+ on the left side.      Heart sounds: Normal heart sounds. No murmur heard.    No friction rub. No gallop.   Pulmonary:      Effort: Pulmonary effort is normal. No respiratory distress.      Breath sounds: Normal breath sounds. No stridor. No wheezing or rales.   Chest:      Chest wall: No tenderness.   Abdominal:      General: There is no distension.      Tenderness: There is no abdominal tenderness. There is no rebound.   Musculoskeletal:         General: No tenderness.      Cervical back: Normal range of motion.   Skin:     General: Skin is warm and dry.   Neurological:      Mental Status: He is alert and oriented to person, place, and time.          Significant Labs: CMP   Recent Labs   Lab 06/05/23  0544 06/05/23  1628 06/06/23  0612   * 134* 135*   K 3.1* 3.5 3.7   CL 94* 95 97   CO2 27 31* 28   GLU 83 88 80   BUN 18 17 17   CREATININE 1.2 1.1 1.0   CALCIUM 8.9 8.9 8.9   PROT 5.7*  --  5.3*   ALBUMIN 3.0*  --  2.8*   BILITOT 1.1*  --   0.9   ALKPHOS 67  --  90   AST 39  --  30   ALT 42  --  36   ANIONGAP 12 8 10       Significant Imaging: Echocardiogram: Transthoracic echo (TTE) complete (Cupid Only):   Results for orders placed or performed during the hospital encounter of 06/02/23   Echo   Result Value Ref Range    BSA 1.96 m2    TDI SEPTAL 0.06 m/s    LV LATERAL E/E' RATIO 7.40 m/s    LV SEPTAL E/E' RATIO 6.17 m/s    LA WIDTH 3.20 cm    IVC diameter 1.91 cm    Left Ventricular Outflow Tract Mean Velocity 0.97 cm/s    Left Ventricular Outflow Tract Mean Gradient 4.25 mmHg    Pulmonary Valve Mean Velocity 0.77 m/s    TDI LATERAL 0.05 m/s    PV PEAK VELOCITY 1.02 cm/s    LVIDd 4.85 3.5 - 6.0 cm    IVS 1.53 (A) 0.6 - 1.1 cm    Posterior Wall 1.51 (A) 0.6 - 1.1 cm    Ao root annulus 3.54 cm    LVIDs 4.11 (A) 2.1 - 4.0 cm    FS 15 28 - 44 %    STJ 3.52 cm    Ascending aorta 3.61 cm    LV mass 314.33 g    RVDD 3.92 cm    TAPSE 2.30 cm    RV S' 0.02 cm/s    Left Ventricle Relative Wall Thickness 0.62 cm    AV mean gradient 8 mmHg    AV valve area 3.28 cm2    AV Velocity Ratio 0.70     AV index (prosthetic) 0.79     E/A ratio 0.80     Mean e' 0.06 m/s    E wave deceleration time 294.04 msec    IVRT 159.85 msec    Pulm vein S/D ratio 1.81     LVOT diameter 2.30 cm    LVOT area 4.2 cm2    LVOT peak stanley 1.35 m/s    LVOT peak VTI 24.40 cm    Ao peak stanley 1.94 m/s    Ao VTI 30.9 cm    RVOT peak stanley 0.76 m/s    RVOT peak VTI 16.1 cm    LVOT stroke volume 101.32 cm3    AV peak gradient 15 mmHg    PV mean gradient 1.21 mmHg    E/E' ratio 6.73 m/s    MV Peak E Stanley 0.37 m/s    TR Max Stanley 2.05 m/s    MV Peak A Stanley 0.46 m/s    PV Peak S Stanley 0.47 m/s    PV Peak D Stanley 0.26 m/s    LV Systolic Volume 74.60 mL    LV Systolic Volume Index 37.9 mL/m2    LV Diastolic Volume 110.03 mL    LV Diastolic Volume Index 55.85 mL/m2    LV Mass Index 160 g/m2    RA Major Axis 4.58 cm    Left Atrium Minor Axis 4.30 cm    Left Atrium Major Axis 4.29 cm    Triscuspid Valve  Regurgitation Peak Gradient 17 mmHg    LA Volume Index (Mod) 17.6 mL/m2    LA volume (mod) 34.68 cm3    Right Atrial Pressure (from IVC) 3 mmHg    EF 20 %    TV rest pulmonary artery pressure 20 mmHg    Narrative    · The left ventricle is mildly enlarged with concentric hypertrophy and   severely decreased systolic function.  · The estimated ejection fraction is 20%.  · Grade I left ventricular diastolic dysfunction.  · There are segmental left ventricular wall motion abnormalities.  · Normal right ventricular size with normal right ventricular systolic   function.  · Normal central venous pressure (3 mmHg).  · The estimated PA systolic pressure is 20 mmHg.  · A small spherical solid left ventricular thrombus is possible. The   thrombus is fixed and located in the apex.

## 2023-06-06 NOTE — INTERVAL H&P NOTE
The patient has been examined and the H&P has been reviewed:    I concur with the findings and no changes have occurred since H&P was written.    Procedure risks, benefits and alternative options discussed and understood by patient/family.          Active Hospital Problems    Diagnosis  POA    *Serum total bilirubin elevated [R17]  Yes    Abnormal EKG [R94.31]  Unknown    Dehydration [E86.0]  Yes    NSTEMI (non-ST elevated myocardial infarction) [I21.4]  Unknown    Acute on chronic combined systolic and diastolic congestive heart failure [I50.43]  Yes    Left ventricular apical thrombus [I51.3]  Yes    Hypertension [I10]  Yes    Vomiting [R11.10]  Yes    Elevated troponin [R77.8]  Yes    Acute renal failure [N17.9]  Yes    Hypokalemia [E87.6]  Yes      Resolved Hospital Problems   No resolved problems to display.

## 2023-06-06 NOTE — ASSESSMENT & PLAN NOTE
Cont ACS tx - asa, statin, bb, heparin drip  R/LHC Monday with Dr. French pending stable renal function  Reg GI eval for N/V in case pt has PCI needing to remain on DAPT    6/6/23 Restent of LAD

## 2023-06-06 NOTE — PLAN OF CARE
Patient remains free of hospital injury. VSS. Cardiac monitoring continued as ordered. IV fluids and heparin continued. NPO after midnight for heart cath, verbalized understanding. Call light in reach, bed in low position. All patient questions answered. Purposeful rounding Q2 hours. Will continue to monitor. Chart review complete.     Problem: Adult Inpatient Plan of Care  Goal: Patient-Specific Goal (Individualized)  Outcome: Ongoing, Progressing  Flowsheets (Taken 6/6/2023 0258)  Anxieties, Fears or Concerns: none  Individualized Care Needs: none     Problem: Fluid and Electrolyte Imbalance (Acute Kidney Injury/Impairment)  Goal: Fluid and Electrolyte Balance  Outcome: Ongoing, Progressing     Problem: Adult Inpatient Plan of Care  Goal: Optimal Comfort and Wellbeing  Outcome: Ongoing, Progressing     Problem: Adult Inpatient Plan of Care  Goal: Absence of Hospital-Acquired Illness or Injury  Outcome: Ongoing, Progressing

## 2023-06-06 NOTE — PROGRESS NOTES
"[facility]  Progress Note    Patient Name: Michael Hopper  MRN: [id number]  Patient Class: IP- Inpatient   Admission Date: 6/2/2023  Length of Stay: 1 days  Attending Physician: Cesia Phan MD  Primary Care Provider: Nkechi Madrid NP        Subjective:     Principal Problem:Serum total bilirubin elevated        HPI:  Michael Hopper is a 57 year old male with history of CAD and hypertension who presents to ED for further evaluation of nausea and vomiting that began three days ago. He came to the ED due dizziness and lightheadedness upon stand and "cloudy thinking". He admits to some generalized abdominal pain. His last BM was one week ago. He denies fever, chest pain, or palpitations. He denies opioid use.     In ED, CXR negative. Labs revealed elevated liver enzymes and elevated Total Bilirubin. Abdominal ultrasound and lipase are pending. Initial troponin 0.159, trending down 0.131. Case has been discussed with  Cardiology. Patient will be placed in observation under the care of hospital medicine.       Overview/Hospital Course:  Michael Hopper is a 57  year old male place on observation for elevated t.bilirubin. Abdominal US showed sludge filled gallbladder, no gallstones. No evidence of cholecystitis. No biliary ductal obstruction. Fatty liver. T. Bilirubin and liver enzymes improving with IVF. Cardiology consulted due to elevated troponin. ECHO showed left ventricle is mildly enlarged with severely decreased systolic function. Ejection fraction is 20%. There is grade I diastolic dysfunction consistent with impaired relaxation. There is a small spherical solid possible thrombus. The thrombus is fixed and located in the apex. There are segmental wall motion abnormalities. He was started on heparin drip for ACS. Will need LHC once renal function improves. On 6/5/23, R/LHC on 6/6/23 with Dr. French. Heparin infusion and current plan of care continued. Hypokalemia noted with K added to " IVF. Kidney function normalized. H/H stable upon repeat evaluation. T. Bili improved and LFTs normalized. Pt verbalized symptom improvement upon exam with N/V resolved. ASA, Statin, and Plavix continued- beta blocker held secondary to bradycardia- will initiate at lower dose as appropriate. Lifevest upon discharge discussed with patient per Cardiology. On 6/6/23, pt taken to Cath lab and LAD stent replaced per Cardiology. BP elevated with Lopressor resumed and Brilinta initiated.          Interval History: pt in bed upon exam with no signs of acute distress noted. Pt taken to cath lab today with stent to LAD replaced. Brillinta initiated and Lopressor resumed. Cath site intact. Cardiology following.     Review of Systems   Constitutional:  Negative for chills, diaphoresis, fatigue and fever.   HENT:  Negative for drooling, ear pain, rhinorrhea and sore throat.    Eyes: Negative.    Respiratory:  Negative for cough, shortness of breath and wheezing.    Cardiovascular:  Negative for palpitations and leg swelling.   Gastrointestinal:  Negative for abdominal pain, constipation, diarrhea, nausea and vomiting.   Endocrine: Negative.    Genitourinary:  Negative for dysuria, hematuria and urgency.   Musculoskeletal: Negative.    Skin:  Negative for color change and wound.   Allergic/Immunologic: Negative.    Neurological:  Negative for dizziness, syncope and speech difficulty.   Hematological: Negative.    Psychiatric/Behavioral: Negative.     Objective:     Vital Signs (Most Recent):  Temp: 98.1 °F (36.7 °C) (06/06/23 1535)  Pulse: 68 (06/06/23 1535)  Resp: 18 (06/06/23 1535)  BP: (!) 195/76 (06/06/23 1535)  SpO2: 99 % (06/06/23 1535) Vital Signs (24h Range):  Temp:  [97.6 °F (36.4 °C)-98.5 °F (36.9 °C)] 98.1 °F (36.7 °C)  Pulse:  [54-83] 68  Resp:  [10-18] 18  SpO2:  [98 %-100 %] 99 %  BP: (103-195)/(34-76) 195/76     Weight: 75.8 kg (167 lb)  Body mass index is 22.65 kg/m².    Intake/Output Summary (Last 24 hours) at  6/6/2023 1627  Last data filed at 6/6/2023 1216  Gross per 24 hour   Intake 1288.96 ml   Output 500 ml   Net 788.96 ml         Physical Exam  Vitals and nursing note reviewed.   Constitutional:       General: He is not in acute distress.     Appearance: He is well-developed.   HENT:      Head: Normocephalic and atraumatic.      Mouth/Throat:      Mouth: Mucous membranes are moist.   Eyes:      Extraocular Movements: Extraocular movements intact.      Conjunctiva/sclera: Conjunctivae normal.   Cardiovascular:      Rate and Rhythm: Normal rate and regular rhythm.      Heart sounds: Normal heart sounds.   Pulmonary:      Effort: Pulmonary effort is normal. No respiratory distress.      Breath sounds: Normal breath sounds.   Abdominal:      General: Bowel sounds are normal. There is no distension.      Palpations: Abdomen is soft.      Tenderness: There is no abdominal tenderness.   Musculoskeletal:         General: Normal range of motion.      Cervical back: Normal range of motion and neck supple. No edema.   Skin:     General: Skin is warm and dry.      Capillary Refill: Capillary refill takes less than 2 seconds.   Neurological:      General: No focal deficit present.      Mental Status: He is alert and oriented to person, place, and time. Mental status is at baseline.           Significant Labs: All pertinent labs within the past 24 hours have been reviewed.  CBC:   Recent Labs   Lab 06/05/23  0544 06/05/23  0751 06/06/23  0612   WBC 2.32* 4.99 3.46*   HGB 23.5* 11.9* 11.1*   HCT 63.5* 32.8* 31.9*   PLT 43* 148* 137*     CMP:   Recent Labs   Lab 06/05/23  0544 06/05/23  1628 06/06/23  0612   * 134* 135*   K 3.1* 3.5 3.7   CL 94* 95 97   CO2 27 31* 28   GLU 83 88 80   BUN 18 17 17   CREATININE 1.2 1.1 1.0   CALCIUM 8.9 8.9 8.9   PROT 5.7*  --  5.3*   ALBUMIN 3.0*  --  2.8*   BILITOT 1.1*  --  0.9   ALKPHOS 67  --  90   AST 39  --  30   ALT 42  --  36   ANIONGAP 12 8 10     Magnesium:   Recent Labs   Lab  06/05/23  0544 06/06/23  0612   MG 2.1 1.9     Troponin: No results for input(s): TROPONINI, TROPONINIHS in the last 48 hours.    Significant Imaging: I have reviewed all pertinent imaging results/findings within the past 24 hours.      Assessment/Plan:      * Serum total bilirubin elevated  In setting of 3 day history of nausea, vomiting, and food intolerance. AST/ALT are also elevated. Bilirubin in urine noted as well.   --will need to rule out hepatobiliary involvement  --check lipase and abdominal ultrasound now  --NPO for now  --IVFs  --antiemetics    6/4/23  -Abdominal US -- sludge filled gallbladder, no gallstones. No evidence of cholecystitis. No biliary ductal obstruction. Fatty liver.  -T. Bilirubin and liver enzymes improving with IVF  -6/5/23- T. Bili improving with LFTs normalized   -6/6/23- normalized     Dehydration  -gentle hydration continued      Abnormal EKG  -noted in the setting of NSTEMI  -cardiology following  -R/LHC performed on 6/6/23- stent to LAD replaced   -electrolytes replaced    Left ventricular apical thrombus  -cardiology following  -thrombus fixed and located and apex per echo  -heparin infusion held   -Brillinta initiated post cath   -echo results reviewed      Acute on chronic combined systolic and diastolic congestive heart failure  Patient is identified as having Combined Systolic and Diastolic heart failure that is Acute on chronic. CHF is currently controlled. Latest ECHO performed and demonstrates- Results for orders placed during the hospital encounter of 06/02/23    Echo    Interpretation Summary  · The left ventricle is mildly enlarged with concentric hypertrophy and severely decreased systolic function.  · The estimated ejection fraction is 20%.  · Grade I left ventricular diastolic dysfunction.  · There are segmental left ventricular wall motion abnormalities.  · Normal right ventricular size with normal right ventricular systolic function.  · Normal central venous  pressure (3 mmHg).  · The estimated PA systolic pressure is 20 mmHg.  · A small spherical solid left ventricular thrombus is possible. The thrombus is fixed and located in the apex.  . Continue Beta Blocker and monitor clinical status closely. Monitor on telemetry. Patient is off CHF pathway.  Monitor strict Is&Os and daily weights.  Place on fluid restriction of gentle hydration due to IVVD . Continue to stress to patient importance of self efficacy and  on diet for CHF. Last BNP reviewed- and noted below   Recent Labs   Lab 06/03/23  0913   BNP 71   .  -echo results reviewed and need for LifeVest upon discharge discussed with patient per Cardiology    NSTEMI (non-ST elevated myocardial infarction)  -patient admitted to [facility] unit  -in the setting of medication noncompliance since 2017  -cardiology consulted  -Troponin elevated with serial results reviewed and downward trend noted  -heparin infusion continued  -R/LHC planned for tomorrow with Dr. French (cardiology)  -aspirin, Plavix, and statin continued  -Beta-blocker held due to bradycardia- we will resume at lower dose as appropriate  -electrolytes replaced as appropriate  -echo results reviewed with EF of 20%Grade I left ventricular diastolic dysfunction.There are segmental left ventricular wall motion abnormalities.  -06/6/23- pt taken to cath lab with LAD stent replaced per Dr. French (cardiology)      Hypokalemia  -K 3.7   -IVF w/ supplementation  Repleted as needed   Magnesium wnl      Acute renal failure  -resolved  Likely related to IVVD r/t NV  Improving with IVF  Creatinine currently 1.0 from 1.7.  Continue to follow     Elevated troponin  EKG reviewed by cardiology and unchanged  Suspect demand ischemia r/t vomiting, renal failure, ?GI   Trending down  Check echocardiogram  Cardiology consulted    06/4/23  -ECHO showed left ventricle is mildly enlarged with severely decreased systolic function. Ejection fraction is 20%. There is grade I  diastolic dysfunction consistent with impaired relaxation. There is a small spherical solid possible thrombus. The thrombus is fixed and located in the apex. There are segmental wall motion abnormalities.   -He was started on heparin drip for ACS. Will need LHC once renal function improves. He will likely have R/LHC on Monday with Dr. French.   -6/5/23- echo results and need for LifeVest discussed with patient per Cardiology. R/LHC planned for tomorrow per Dr. French- heparin infusion continued-serial results reviewed with downward trend  -6/6/23- cath completed with LAD stent replaced       Vomiting  --see plan for Elevated total Bilirubin.  Resolved.  -IV hydration  -gentle hydration      Hypertension  BP stable.  -metoprolol resumed   -IV Hydralazine as needed for PRN for SBP > 170       VTE Risk Mitigation (From admission, onward)    None          Discharge Planning   GAUDENCIO:      Code Status: Not on file   Is the patient medically ready for discharge?:     Reason for patient still in hospital (select all that apply): Patient trending condition, Laboratory test, Treatment, Imaging and Consult recommendations  Discharge Plan A: Home                  Bhuimka Purdy NP  Department of Hospital Medicine   [facility]

## 2023-06-06 NOTE — PROGRESS NOTES
[facility] ([facility])  Cardiology  Progress Note    Patient Name: Michael Hopper  MRN: [id number]   Admission Date: 6/2/2023  Hospital Length of Stay: 1 days  Code Status: No Order   Attending Physician: Cesia Phan MD   Primary Care Physician: Nkechi Madrid NP  Expected Discharge Date:   Principal Problem:Serum total bilirubin elevated    Subjective:     Hospital Course:   6/5/23-Patient seen and examined in room. Patient denies CP today. Plan Right and LHC tomorrow.    6/6/23-Patient seen and examined in room. Post re stent of LAD and stable.  Continue all medications      Interval History: repeat stent of LAD    Review of Systems   Constitutional: Positive for malaise/fatigue. Negative for chills, diaphoresis, night sweats, weight gain and weight loss.   HENT:  Negative for congestion, hoarse voice, sore throat and stridor.    Eyes:  Negative for double vision and pain.   Cardiovascular:  Negative for chest pain, claudication, cyanosis, dyspnea on exertion, irregular heartbeat, leg swelling, near-syncope, orthopnea, palpitations, paroxysmal nocturnal dyspnea and syncope.   Respiratory:  Negative for cough, hemoptysis, shortness of breath, sleep disturbances due to breathing, snoring, sputum production and wheezing.    Endocrine: Negative for cold intolerance, heat intolerance and polydipsia.   Hematologic/Lymphatic: Negative for bleeding problem. Does not bruise/bleed easily.   Skin:  Negative for color change, dry skin and rash.   Musculoskeletal:  Negative for joint swelling and muscle cramps.   Gastrointestinal:  Negative for bloating, abdominal pain, constipation, diarrhea, dysphagia, melena, nausea and vomiting.   Genitourinary:  Negative for flank pain and urgency.   Neurological:  Negative for dizziness, focal weakness, headaches, light-headedness, loss of balance, seizures and weakness.   Psychiatric/Behavioral:  Negative for altered mental status and memory loss. The patient is not  nervous/anxious.    Objective:     Vital Signs (Most Recent):  Temp: 98.2 °F (36.8 °C) (06/06/23 0759)  Pulse: (!) 55 (06/06/23 1230)  Resp: 16 (06/06/23 1230)  BP: 103/61 (06/06/23 1230)  SpO2: 100 % (06/06/23 1230) Vital Signs (24h Range):  Temp:  [97.6 °F (36.4 °C)-98.5 °F (36.9 °C)] 98.2 °F (36.8 °C)  Pulse:  [55-83] 55  Resp:  [15-18] 16  SpO2:  [98 %-100 %] 100 %  BP: (103-125)/(56-71) 103/61     Weight: 75.8 kg (167 lb)  Body mass index is 22.65 kg/m².     SpO2: 100 %         Intake/Output Summary (Last 24 hours) at 6/6/2023 1239  Last data filed at 6/6/2023 1216  Gross per 24 hour   Intake 1288.96 ml   Output 500 ml   Net 788.96 ml       Lines/Drains/Airways       Peripheral Intravenous Line  Duration                  Peripheral IV - Single Lumen 06/04/23 1650 22 G Posterior;Right Forearm 1 day         Peripheral IV - Single Lumen 06/04/23 2300 22 G Anterior;Distal;Right Upper Arm 1 day                       Physical Exam  Eyes:      Pupils: Pupils are equal, round, and reactive to light.   Neck:      Trachea: No tracheal deviation.   Cardiovascular:      Rate and Rhythm: Normal rate and regular rhythm.      Pulses: Intact distal pulses.           Carotid pulses are 2+ on the right side and 2+ on the left side.       Radial pulses are 2+ on the right side and 2+ on the left side.        Femoral pulses are 2+ on the right side and 2+ on the left side.       Popliteal pulses are 2+ on the right side and 2+ on the left side.        Dorsalis pedis pulses are 2+ on the right side and 2+ on the left side.        Posterior tibial pulses are 2+ on the right side and 2+ on the left side.      Heart sounds: Normal heart sounds. No murmur heard.    No friction rub. No gallop.   Pulmonary:      Effort: Pulmonary effort is normal. No respiratory distress.      Breath sounds: Normal breath sounds. No stridor. No wheezing or rales.   Chest:      Chest wall: No tenderness.   Abdominal:      General: There is no distension.       Tenderness: There is no abdominal tenderness. There is no rebound.   Musculoskeletal:         General: No tenderness.      Cervical back: Normal range of motion.   Skin:     General: Skin is warm and dry.   Neurological:      Mental Status: He is alert and oriented to person, place, and time.          Significant Labs: CMP   Recent Labs   Lab 06/05/23  0544 06/05/23  1628 06/06/23  0612   * 134* 135*   K 3.1* 3.5 3.7   CL 94* 95 97   CO2 27 31* 28   GLU 83 88 80   BUN 18 17 17   CREATININE 1.2 1.1 1.0   CALCIUM 8.9 8.9 8.9   PROT 5.7*  --  5.3*   ALBUMIN 3.0*  --  2.8*   BILITOT 1.1*  --  0.9   ALKPHOS 67  --  90   AST 39  --  30   ALT 42  --  36   ANIONGAP 12 8 10       Significant Imaging: Echocardiogram: Transthoracic echo (TTE) complete (Cupid Only):   Results for orders placed or performed during the hospital encounter of 06/02/23   Echo   Result Value Ref Range    BSA 1.96 m2    TDI SEPTAL 0.06 m/s    LV LATERAL E/E' RATIO 7.40 m/s    LV SEPTAL E/E' RATIO 6.17 m/s    LA WIDTH 3.20 cm    IVC diameter 1.91 cm    Left Ventricular Outflow Tract Mean Velocity 0.97 cm/s    Left Ventricular Outflow Tract Mean Gradient 4.25 mmHg    Pulmonary Valve Mean Velocity 0.77 m/s    TDI LATERAL 0.05 m/s    PV PEAK VELOCITY 1.02 cm/s    LVIDd 4.85 3.5 - 6.0 cm    IVS 1.53 (A) 0.6 - 1.1 cm    Posterior Wall 1.51 (A) 0.6 - 1.1 cm    Ao root annulus 3.54 cm    LVIDs 4.11 (A) 2.1 - 4.0 cm    FS 15 28 - 44 %    STJ 3.52 cm    Ascending aorta 3.61 cm    LV mass 314.33 g    RVDD 3.92 cm    TAPSE 2.30 cm    RV S' 0.02 cm/s    Left Ventricle Relative Wall Thickness 0.62 cm    AV mean gradient 8 mmHg    AV valve area 3.28 cm2    AV Velocity Ratio 0.70     AV index (prosthetic) 0.79     E/A ratio 0.80     Mean e' 0.06 m/s    E wave deceleration time 294.04 msec    IVRT 159.85 msec    Pulm vein S/D ratio 1.81     LVOT diameter 2.30 cm    LVOT area 4.2 cm2    LVOT peak roberto 1.35 m/s    LVOT peak VTI 24.40 cm    Ao peak roberto 1.94 m/s     Ao VTI 30.9 cm    RVOT peak stanley 0.76 m/s    RVOT peak VTI 16.1 cm    LVOT stroke volume 101.32 cm3    AV peak gradient 15 mmHg    PV mean gradient 1.21 mmHg    E/E' ratio 6.73 m/s    MV Peak E Stanley 0.37 m/s    TR Max Stanley 2.05 m/s    MV Peak A Stanley 0.46 m/s    PV Peak S Stanley 0.47 m/s    PV Peak D Stanley 0.26 m/s    LV Systolic Volume 74.60 mL    LV Systolic Volume Index 37.9 mL/m2    LV Diastolic Volume 110.03 mL    LV Diastolic Volume Index 55.85 mL/m2    LV Mass Index 160 g/m2    RA Major Axis 4.58 cm    Left Atrium Minor Axis 4.30 cm    Left Atrium Major Axis 4.29 cm    Triscuspid Valve Regurgitation Peak Gradient 17 mmHg    LA Volume Index (Mod) 17.6 mL/m2    LA volume (mod) 34.68 cm3    Right Atrial Pressure (from IVC) 3 mmHg    EF 20 %    TV rest pulmonary artery pressure 20 mmHg    Narrative    · The left ventricle is mildly enlarged with concentric hypertrophy and   severely decreased systolic function.  · The estimated ejection fraction is 20%.  · Grade I left ventricular diastolic dysfunction.  · There are segmental left ventricular wall motion abnormalities.  · Normal right ventricular size with normal right ventricular systolic   function.  · Normal central venous pressure (3 mmHg).  · The estimated PA systolic pressure is 20 mmHg.  · A small spherical solid left ventricular thrombus is possible. The   thrombus is fixed and located in the apex.        Assessment and Plan:     Brief HPI: restent of LAD    * Serum total bilirubin elevated  S/p abd u/s - f/u with GI/hepatology     Left ventricular apical thrombus  Cont heparin drip   Rec anticoag upon DC x min 3 months with repeat ECHO     Acute on chronic combined systolic and diastolic congestive heart failure  Not on diuretics now due to n/v  Monitor volume status, BNP ordered   Will start OMT once n/v resolves and renal function stable   Discuss Lifevest upon DC      NSTEMI (non-ST elevated myocardial infarction)  Cont ACS tx - asa, statin, bb, heparin  drip  R/[facility] Monday with Dr. French pending stable renal function  Reg GI eval for N/V in case pt has PCI needing to remain on DAPT    6/6/23 Restent of LAD    Hypokalemia  Replete K > 4, Mg > 2    Acute renal failure  S/p IVF improving    Elevated troponin  See plan for NSTEMI    Vomiting  Rec GI eval if no improvement     Hypertension  Titrate meds         VTE Risk Mitigation (From admission, onward)         Ordered     heparin (porcine) injection  As needed (PRN)         06/06/23 1033     heparin 25,000 units in dextrose 5% (100 units/ml) IV bolus from bag - ADDITIONAL PRN BOLUS - 60 units/kg (max bolus 4000 units)  As needed (PRN)        Question:  Heparin Infusion Adjustment (DO NOT MODIFY ANSWER)  Answer:  [email or web address]\Images\Pharmacy\HeparinInfusions\heparin LOW INTENSITY nomogram for [facility] [id number]    06/03/23 0858     heparin 25,000 units in dextrose 5% (100 units/ml) IV bolus from bag - ADDITIONAL PRN BOLUS - 30 units/kg (max bolus 4000 units)  As needed (PRN)        Question:  Heparin Infusion Adjustment (DO NOT MODIFY ANSWER)  Answer:  [email or web address]\Images\Pharmacy\HeparinInfusions\heparin LOW INTENSITY nomogram for [facility] [id number].pdf    06/03/23 0858     heparin 25,000 units in dextrose 5% 250 mL (100 units/mL) infusion LOW INTENSITY nomogram - [facility]  Continuous        Question Answer Comment   Heparin Infusion Adjustment (DO NOT MODIFY ANSWER) [email or web address]\epic\Images\Pharmacy\HeparinInfusions\heparin LOW INTENSITY nomogram for [facility] [id number].pdf    Begin at (in units/kg/hr) 12        06/03/23 0858                Glenroy Nolen MD  Cardiology  [facility] ([facility])

## 2023-06-06 NOTE — ASSESSMENT & PLAN NOTE
-cardiology following  -thrombus fixed and located and apex per echo  -heparin infusion held   -Brillinta initiated post cath   -echo results reviewed

## 2023-06-06 NOTE — PLAN OF CARE
Bed locked, in lowest position. Call bell in reach. Purposeful rounding done every two hours. Blood glucose monitoring. Heparin DC'd per MD order. Chart check complete. Will continue with plan of care.

## 2023-06-06 NOTE — ASSESSMENT & PLAN NOTE
EKG reviewed by cardiology and unchanged  Suspect demand ischemia r/t vomiting, renal failure, ?GI   Trending down  Check echocardiogram  Cardiology consulted    06/4/23  -ECHO showed left ventricle is mildly enlarged with severely decreased systolic function. Ejection fraction is 20%. There is grade I diastolic dysfunction consistent with impaired relaxation. There is a small spherical solid possible thrombus. The thrombus is fixed and located in the apex. There are segmental wall motion abnormalities.   -He was started on heparin drip for ACS. Will need LHC once renal function improves. He will likely have R/LHC on Monday with Dr. French.   -6/5/23- echo results and need for LifeVest discussed with patient per Cardiology. R/LHC planned for tomorrow per Dr. French- heparin infusion continued-serial results reviewed with downward trend  -6/6/23- cath completed with LAD stent replaced

## 2023-06-06 NOTE — OP NOTE
INPATIENT Operative Note         SUMMARY     Surgery Date: 6/6/2023     Surgeon(s) and Role:     * Mary Kate French MD - Primary    ASSISTANT:none    Pre-op Diagnosis:  Cardiomyopathy, unspecified type [I42.9]      Post-op Diagnosis:  Cardiomyopathy, unspecified type [I42.9]    Procedure(s) (LRB):  CATHETERIZATION, HEART, BOTH LEFT AND RIGHT (N/A)  Ultrasound-coronary (Left)  Angioplasty-coronary  Stent, Drug Eluting, Single Vessel, Coronary (Left)    COMPLICATION:none    Anesthesia: RN IV Sedation    Findings/Key Components:  Instent stenosis with underdeployed stent ivus treated with scoring balloon and subsequent ivus guided restenting achieving 8 mm2   Lcx and rca normal   Lvg not done   Mild elevation of rt sided pressures.  Estimated Blood Loss: < 50 ML.         SPECIMEN: NONE    Devices/Prostetics: synergy 3.5x32    PLAN:   Routine post stent care

## 2023-06-06 NOTE — ASSESSMENT & PLAN NOTE
-patient admitted to med tele unit  -in the setting of medication noncompliance since 2017  -cardiology consulted  -Troponin elevated with serial results reviewed and downward trend noted  -heparin infusion continued  -R/LHC planned for tomorrow with Dr. French (cardiology)  -aspirin, Plavix, and statin continued  -Beta-blocker held due to bradycardia- we will resume at lower dose as appropriate  -electrolytes replaced as appropriate  -echo results reviewed with EF of 20%Grade I left ventricular diastolic dysfunction.There are segmental left ventricular wall motion abnormalities.  -06/6/23- pt taken to cath lab with LAD stent replaced per Dr. French (cardiology)

## 2023-06-06 NOTE — ASSESSMENT & PLAN NOTE
-resolved  Likely related to IVVD r/t NV  Improving with IVF  Creatinine currently 1.0 from 1.7.  Continue to follow

## 2023-06-06 NOTE — ASSESSMENT & PLAN NOTE
-noted in the setting of NSTEMI  -cardiology following  -R/LHC performed on 6/6/23- stent to LAD replaced   -electrolytes replaced

## 2023-06-07 LAB
ALBUMIN SERPL BCP-MCNC: 2.6 G/DL (ref 3.5–5.2)
ALP SERPL-CCNC: 92 U/L (ref 55–135)
ALT SERPL W/O P-5'-P-CCNC: 31 U/L (ref 10–44)
ANION GAP SERPL CALC-SCNC: 7 MMOL/L (ref 8–16)
APTT PPP: 24.6 SEC (ref 21–32)
AST SERPL-CCNC: 30 U/L (ref 10–40)
BASOPHILS # BLD AUTO: 0.03 K/UL (ref 0–0.2)
BASOPHILS NFR BLD: 0.8 % (ref 0–1.9)
BILIRUB SERPL-MCNC: 0.8 MG/DL (ref 0.1–1)
BUN SERPL-MCNC: 14 MG/DL (ref 6–20)
CALCIUM SERPL-MCNC: 8.5 MG/DL (ref 8.7–10.5)
CHLORIDE SERPL-SCNC: 102 MMOL/L (ref 95–110)
CO2 SERPL-SCNC: 26 MMOL/L (ref 23–29)
CREAT SERPL-MCNC: 1 MG/DL (ref 0.5–1.4)
DIFFERENTIAL METHOD: ABNORMAL
EOSINOPHIL # BLD AUTO: 0 K/UL (ref 0–0.5)
EOSINOPHIL NFR BLD: 0.5 % (ref 0–8)
ERYTHROCYTE [DISTWIDTH] IN BLOOD BY AUTOMATED COUNT: 11.9 % (ref 11.5–14.5)
EST. GFR  (NO RACE VARIABLE): >60 ML/MIN/1.73 M^2
GLUCOSE SERPL-MCNC: 98 MG/DL (ref 70–110)
HCT VFR BLD AUTO: 30.3 % (ref 40–54)
HGB BLD-MCNC: 10.8 G/DL (ref 14–18)
IMM GRANULOCYTES # BLD AUTO: 0.01 K/UL (ref 0–0.04)
IMM GRANULOCYTES NFR BLD AUTO: 0.3 % (ref 0–0.5)
INR PPP: 1.1 (ref 0.8–1.2)
LYMPHOCYTES # BLD AUTO: 1 K/UL (ref 1–4.8)
LYMPHOCYTES NFR BLD: 25.6 % (ref 18–48)
MAGNESIUM SERPL-MCNC: 1.7 MG/DL (ref 1.6–2.6)
MCH RBC QN AUTO: 34.4 PG (ref 27–31)
MCHC RBC AUTO-ENTMCNC: 35.6 G/DL (ref 32–36)
MCV RBC AUTO: 97 FL (ref 82–98)
MONOCYTES # BLD AUTO: 0.6 K/UL (ref 0.3–1)
MONOCYTES NFR BLD: 14.9 % (ref 4–15)
NEUTROPHILS # BLD AUTO: 2.3 K/UL (ref 1.8–7.7)
NEUTROPHILS NFR BLD: 57.9 % (ref 38–73)
NRBC BLD-RTO: 0 /100 WBC
PLATELET # BLD AUTO: 149 K/UL (ref 150–450)
PMV BLD AUTO: 9.5 FL (ref 9.2–12.9)
POCT GLUCOSE: 109 MG/DL (ref 70–110)
POCT GLUCOSE: 125 MG/DL (ref 70–110)
POCT GLUCOSE: 154 MG/DL (ref 70–110)
POCT GLUCOSE: 95 MG/DL (ref 70–110)
POTASSIUM SERPL-SCNC: 3.7 MMOL/L (ref 3.5–5.1)
PROT SERPL-MCNC: 5.1 G/DL (ref 6–8.4)
PROTHROMBIN TIME: 11.5 SEC (ref 9–12.5)
RBC # BLD AUTO: 3.14 M/UL (ref 4.6–6.2)
SODIUM SERPL-SCNC: 135 MMOL/L (ref 136–145)
WBC # BLD AUTO: 3.9 K/UL (ref 3.9–12.7)

## 2023-06-07 PROCEDURE — 36415 COLL VENOUS BLD VENIPUNCTURE: CPT | Performed by: NURSE PRACTITIONER

## 2023-06-07 PROCEDURE — 99233 PR SUBSEQUENT HOSPITAL CARE,LEVL III: ICD-10-PCS | Mod: ,,, | Performed by: INTERNAL MEDICINE

## 2023-06-07 PROCEDURE — 25000003 PHARM REV CODE 250: Performed by: INTERNAL MEDICINE

## 2023-06-07 PROCEDURE — 25000003 PHARM REV CODE 250: Performed by: PHYSICIAN ASSISTANT

## 2023-06-07 PROCEDURE — 21400001 HC TELEMETRY ROOM

## 2023-06-07 PROCEDURE — 80053 COMPREHEN METABOLIC PANEL: CPT | Performed by: INTERNAL MEDICINE

## 2023-06-07 PROCEDURE — 83735 ASSAY OF MAGNESIUM: CPT | Performed by: INTERNAL MEDICINE

## 2023-06-07 PROCEDURE — 85610 PROTHROMBIN TIME: CPT | Performed by: NURSE PRACTITIONER

## 2023-06-07 PROCEDURE — 85730 THROMBOPLASTIN TIME PARTIAL: CPT | Performed by: NURSE PRACTITIONER

## 2023-06-07 PROCEDURE — 94761 N-INVAS EAR/PLS OXIMETRY MLT: CPT

## 2023-06-07 PROCEDURE — 36415 COLL VENOUS BLD VENIPUNCTURE: CPT | Performed by: INTERNAL MEDICINE

## 2023-06-07 PROCEDURE — 85025 COMPLETE CBC W/AUTO DIFF WBC: CPT | Performed by: INTERNAL MEDICINE

## 2023-06-07 PROCEDURE — 63600175 PHARM REV CODE 636 W HCPCS: Performed by: NURSE PRACTITIONER

## 2023-06-07 PROCEDURE — 99233 SBSQ HOSP IP/OBS HIGH 50: CPT | Mod: ,,, | Performed by: INTERNAL MEDICINE

## 2023-06-07 PROCEDURE — 25000003 PHARM REV CODE 250: Performed by: NURSE PRACTITIONER

## 2023-06-07 RX ORDER — ASPIRIN 81 MG/1
81 TABLET ORAL DAILY
Qty: 30 TABLET | Refills: 0 | Status: SHIPPED | OUTPATIENT
Start: 2023-06-08 | End: 2023-06-07 | Stop reason: SDUPTHER

## 2023-06-07 RX ORDER — WARFARIN SODIUM 5 MG/1
5 TABLET ORAL DAILY
Status: DISCONTINUED | OUTPATIENT
Start: 2023-06-07 | End: 2023-06-07

## 2023-06-07 RX ORDER — ASPIRIN 81 MG/1
81 TABLET ORAL DAILY
Qty: 14 TABLET | Refills: 0
Start: 2023-06-08 | End: 2023-07-07 | Stop reason: ALTCHOICE

## 2023-06-07 RX ORDER — ENOXAPARIN SODIUM 100 MG/ML
1 INJECTION SUBCUTANEOUS
Status: DISCONTINUED | OUTPATIENT
Start: 2023-06-07 | End: 2023-06-07

## 2023-06-07 RX ORDER — METOPROLOL TARTRATE 25 MG/1
25 TABLET, FILM COATED ORAL 2 TIMES DAILY
Qty: 60 TABLET | Refills: 0 | Status: SHIPPED | OUTPATIENT
Start: 2023-06-07 | End: 2023-06-08 | Stop reason: HOSPADM

## 2023-06-07 RX ORDER — APIXABAN 5 MG (74)
KIT ORAL
Qty: 74 TABLET | Refills: 0 | Status: SHIPPED | OUTPATIENT
Start: 2023-06-07 | End: 2023-06-08 | Stop reason: HOSPADM

## 2023-06-07 RX ORDER — METOPROLOL SUCCINATE 25 MG/1
25 TABLET, EXTENDED RELEASE ORAL DAILY
Status: DISCONTINUED | OUTPATIENT
Start: 2023-06-07 | End: 2023-06-07

## 2023-06-07 RX ADMIN — TRAMADOL HYDROCHLORIDE 50 MG: 50 TABLET, COATED ORAL at 06:06

## 2023-06-07 RX ADMIN — APIXABAN 5 MG: 2.5 TABLET, FILM COATED ORAL at 08:06

## 2023-06-07 RX ADMIN — ENOXAPARIN SODIUM 80 MG: 80 INJECTION SUBCUTANEOUS at 11:06

## 2023-06-07 RX ADMIN — ATORVASTATIN CALCIUM 80 MG: 40 TABLET, FILM COATED ORAL at 08:06

## 2023-06-07 RX ADMIN — TICAGRELOR 90 MG: 90 TABLET ORAL at 08:06

## 2023-06-07 RX ADMIN — ASPIRIN 81 MG: 81 TABLET, COATED ORAL at 08:06

## 2023-06-07 RX ADMIN — METOPROLOL TARTRATE 25 MG: 25 TABLET, FILM COATED ORAL at 08:06

## 2023-06-07 NOTE — ASSESSMENT & PLAN NOTE
-patient admitted to med tele unit  -in the setting of medication noncompliance since 2017  -cardiology consulted  -Troponin elevated with serial results reviewed and downward trend noted  -heparin infusion continued  -R/LHC performed on 6/6/23 per Dr. French (cardiology)  -aspirin, Brilinta, and statin continued  -Beta-blocker held due to bradycardia- then resumed on 6/6/23  -electrolytes replaced as appropriate  -echo results reviewed with EF of 20%Grade I left ventricular diastolic dysfunction.There are segmental left ventricular wall motion abnormalities.  -06/6/23- pt taken to cath lab with LAD stent replaced per Dr. French (cardiology)

## 2023-06-07 NOTE — CONSULTS
Clinical Pharmacy Consult Note: Coumadin Dosing and Monitoring     Indication: Left ventricular apical thrombus  Target INR is 2 - 3   Lab Results   Component Value Date    INR 1.1 06/03/2023    INR 1.1 08/26/2016    INR 1.0 08/11/2010       Lab order for daily PT/INR? Yes  Coumadin diet ordered? Yes  Counseled? No    Drug interactions: Acetaminophen, aspirin, ticagrelor, tramadol     Plan:  Patient will be initiated on a dose of 5 mg per day.  PT/INR will be monitored daily. Dose adjustments will be made accordingly.      Thank you for allowing us to participate in this patient's care.     Keny Santos, PharmD 6/7/2023 10:51 AM

## 2023-06-07 NOTE — ASSESSMENT & PLAN NOTE
--see plan for Elevated total Bilirubin.  Resolved.  -IV hydration weaned- pt tolerating oral intake

## 2023-06-07 NOTE — PLAN OF CARE
[facility] - Med Surg  Discharge Final Note    Primary Care Provider: Nkechi Madrid NP    Expected Discharge Date: 6/7/2023    Final Discharge Note (most recent)       Final Note - 06/07/23 1003          Final Note    Assessment Type Final Discharge Note     Anticipated Discharge Disposition Home or Self Care     Hospital Resources/Appts/Education Provided Appointments scheduled by Navigator/Coordinator;Appointments scheduled and added to AVS        Post-Acute Status    Discharge Delays None known at this time                     Contact Info       Dr. Quijano Say    [address] [facility] [facility] [address] [phone]       Next Steps: Go to    Instructions: PCP appointment scheduled for 6/28/23 at 9am.     Please bring your Medicaid insurance card.          New patient PCP appt scheduled with [facility]. Sw added information to AVS.     No additional d/c needs at this time.

## 2023-06-07 NOTE — PROGRESS NOTES
"[facility] Medicine  Progress Note    Patient Name: Michael Hopper  MRN: [id number]  Patient Class: IP- Inpatient   Admission Date: 6/2/2023  Length of Stay: 2 days  Attending Physician: Cesia Phan MD  Primary Care Provider: Nkechi Madrid NP        Subjective:     Principal Problem:Serum total bilirubin elevated        HPI:  Michael Hopper is a 57 year old male with history of CAD and hypertension who presents to ED for further evaluation of nausea and vomiting that began three days ago. He came to the ED due dizziness and lightheadedness upon stand and "cloudy thinking". He admits to some generalized abdominal pain. His last BM was one week ago. He denies fever, chest pain, or palpitations. He denies opioid use.     In ED, CXR negative. Labs revealed elevated liver enzymes and elevated Total Bilirubin. Abdominal ultrasound and lipase are pending. Initial troponin 0.159, trending down 0.131. Case has been discussed with  Cardiology. Patient will be placed in observation under the care of hospital medicine.       Overview/Hospital Course:  Michael Hopper is a 57  year old male place on observation for elevated t.bilirubin. Abdominal US showed sludge filled gallbladder, no gallstones. No evidence of cholecystitis. No biliary ductal obstruction. Fatty liver. T. Bilirubin and liver enzymes improving with IVF. Cardiology consulted due to elevated troponin. ECHO showed left ventricle is mildly enlarged with severely decreased systolic function. Ejection fraction is 20%. There is grade I diastolic dysfunction consistent with impaired relaxation. There is a small spherical solid possible thrombus. The thrombus is fixed and located in the apex. There are segmental wall motion abnormalities. He was started on heparin drip for ACS. Will need LHC once renal function improves. On 6/5/23, R/LHC on 6/6/23 with Dr. French. Heparin infusion and current plan of care continued. Hypokalemia noted with K added to " IVF. Kidney function normalized. H/H stable upon repeat evaluation. T. Bili improved and LFTs normalized. Pt verbalized symptom improvement upon exam with N/V resolved. ASA, Statin, and Plavix continued- beta blocker held secondary to bradycardia- will initiate at lower dose as appropriate. Lifevest upon discharge discussed with patient per Cardiology. On 6/6/23, pt taken to Cath lab and LAD stent replaced per Cardiology. BP elevated with Lopressor resumed and Brilinta initiated. On 6/7/23, Case discussed with Cardiology and Lovenox bridge initiated and pharmacy consulted for Coumadin due to thrombus at apex. Life vest to be obtained outpatient. Discharge anticipated once INR trending upward and patient able to demonstrate administration of Lovenox injections. Vital signs stable. Plan of care discussed with patient per  and Cardiology with understanding verbalized.          Interval History: pt in bed upon exam with no additional complaints. Need for Coumadin upon discharge discussed. Cardiology following. Plan of care discussed with understanding verbalized. Pharmacy consulted for Coumadin dosing.     Review of Systems   Constitutional:  Negative for chills, diaphoresis, fatigue and fever.   HENT:  Negative for drooling, ear pain, rhinorrhea and sore throat.    Eyes: Negative.    Respiratory:  Negative for cough, shortness of breath and wheezing.    Cardiovascular:  Negative for palpitations and leg swelling.   Gastrointestinal:  Negative for abdominal pain, constipation, diarrhea, nausea and vomiting.   Endocrine: Negative.    Genitourinary:  Negative for dysuria, hematuria and urgency.   Musculoskeletal: Negative.    Skin:  Negative for color change and wound.   Allergic/Immunologic: Negative.    Neurological:  Negative for dizziness, syncope and speech difficulty.   Hematological: Negative.    Psychiatric/Behavioral: Negative.  Negative for behavioral problems and confusion. The patient is not nervous/anxious.     Objective:     Vital Signs (Most Recent):  Temp: 98 °F (36.7 °C) (06/07/23 1137)  Pulse: 64 (06/07/23 1322)  Resp: 18 (06/07/23 1137)  BP: 132/71 (06/07/23 1137)  SpO2: 99 % (06/07/23 1137) Vital Signs (24h Range):  Temp:  [97.6 °F (36.4 °C)-98.8 °F (37.1 °C)] 98 °F (36.7 °C)  Pulse:  [57-74] 64  Resp:  [10-18] 18  SpO2:  [97 %-100 %] 99 %  BP: (119-195)/(34-76) 132/71     Weight: 75.8 kg (167 lb)  Body mass index is 22.65 kg/m².    Intake/Output Summary (Last 24 hours) at 6/7/2023 1338  Last data filed at 6/7/2023 1038  Gross per 24 hour   Intake 120 ml   Output --   Net 120 ml         Physical Exam  Vitals and nursing note reviewed.   Constitutional:       General: He is not in acute distress.     Appearance: He is well-developed.   HENT:      Head: Normocephalic and atraumatic.      Mouth/Throat:      Mouth: Mucous membranes are moist.   Eyes:      Extraocular Movements: Extraocular movements intact.      Conjunctiva/sclera: Conjunctivae normal.   Cardiovascular:      Rate and Rhythm: Normal rate and regular rhythm.      Heart sounds: Normal heart sounds.   Pulmonary:      Effort: Pulmonary effort is normal. No respiratory distress.      Breath sounds: Normal breath sounds.   Abdominal:      General: Bowel sounds are normal. There is no distension.      Palpations: Abdomen is soft.      Tenderness: There is no abdominal tenderness.   Genitourinary:     Comments: Deferred   Musculoskeletal:         General: Normal range of motion.      Cervical back: Normal range of motion and neck supple. No edema.      Comments: L radial site intact    Skin:     General: Skin is warm and dry.      Capillary Refill: Capillary refill takes less than 2 seconds.   Neurological:      General: No focal deficit present.      Mental Status: He is alert and oriented to person, place, and time. Mental status is at baseline.   Psychiatric:         Mood and Affect: Mood normal.         Behavior: Behavior normal.           Significant  Labs: All pertinent labs within the past 24 hours have been reviewed.  CBC:   Recent Labs   Lab 06/06/23  0612 06/07/23  0643   WBC 3.46* 3.90   HGB 11.1* 10.8*   HCT 31.9* 30.3*   * 149*     CMP:   Recent Labs   Lab 06/05/23  1628 06/06/23  0612 06/07/23  0643   * 135* 135*   K 3.5 3.7 3.7   CL 95 97 102   CO2 31* 28 26   GLU 88 80 98   BUN 17 17 14   CREATININE 1.1 1.0 1.0   CALCIUM 8.9 8.9 8.5*   PROT  --  5.3* 5.1*   ALBUMIN  --  2.8* 2.6*   BILITOT  --  0.9 0.8   ALKPHOS  --  90 92   AST  --  30 30   ALT  --  36 31   ANIONGAP 8 10 7*     Magnesium:   Recent Labs   Lab 06/06/23  0612 06/07/23  0643   MG 1.9 1.7       Significant Imaging: I have reviewed all pertinent imaging results/findings within the past 24 hours.      Assessment/Plan:      * Serum total bilirubin elevated  In setting of 3 day history of nausea, vomiting, and food intolerance. AST/ALT are also elevated. Bilirubin in urine noted as well.   --will need to rule out hepatobiliary involvement  --check lipase and abdominal ultrasound now  --NPO for now  --IVFs  --antiemetics    6/4/23  -Abdominal US -- sludge filled gallbladder, no gallstones. No evidence of cholecystitis. No biliary ductal obstruction. Fatty liver.  -T. Bilirubin and liver enzymes improving with IVF  -6/5/23- T. Bili improving with LFTs normalized   -6/6/23- normalized     Dehydration  -gentle hydration weaned   -adequate oral intake noted       Abnormal EKG  -noted in the setting of NSTEMI  -cardiology following  -R/LHC performed on 6/6/23- stent to LAD replaced   -electrolytes replaced    Left ventricular apical thrombus  -cardiology following  -thrombus fixed and located and apex per echo  -heparin infusion held   -Brillinta initiated post cath and pharmacy consulted for coumadin dosing   -Lovenox initiated for bridge   -echo results reviewed      Acute on chronic combined systolic and diastolic congestive heart failure  Patient is identified as having Combined  Systolic and Diastolic heart failure that is Acute on chronic. CHF is currently controlled. Latest ECHO performed and demonstrates- Results for orders placed during the hospital encounter of 06/02/23    Echo    Interpretation Summary  · The left ventricle is mildly enlarged with concentric hypertrophy and severely decreased systolic function.  · The estimated ejection fraction is 20%.  · Grade I left ventricular diastolic dysfunction.  · There are segmental left ventricular wall motion abnormalities.  · Normal right ventricular size with normal right ventricular systolic function.  · Normal central venous pressure (3 mmHg).  · The estimated PA systolic pressure is 20 mmHg.  · A small spherical solid left ventricular thrombus is possible. The thrombus is fixed and located in the apex.  . Continue Beta Blocker and monitor clinical status closely. Monitor on telemetry. Patient is off CHF pathway.  Monitor strict Is&Os and daily weights.  Place on fluid restriction of gentle hydration due to IVVD . Continue to stress to patient importance of self efficacy and  on diet for CHF. Last BNP reviewed- and noted below   Recent Labs   Lab 06/03/23  0913   BNP 71   .  -echo results reviewed and need for LifeVest upon discharge discussed with patient per Cardiology    NSTEMI (non-ST elevated myocardial infarction)  -patient admitted to med tele unit  -in the setting of medication noncompliance since 2017  -cardiology consulted  -Troponin elevated with serial results reviewed and downward trend noted  -heparin infusion continued  -R/LHC performed on 6/6/23 per Dr. French (cardiology)  -aspirin, Brilinta, and statin continued  -Beta-blocker held due to bradycardia- then resumed on 6/6/23  -electrolytes replaced as appropriate  -echo results reviewed with EF of 20%Grade I left ventricular diastolic dysfunction.There are segmental left ventricular wall motion abnormalities.  -06/6/23- pt taken to cath lab with LAD stent  replaced per Dr. French (cardiology)      Hypokalemia  -K 3.7   -IVF w/ supplementation  Repleted as needed   Magnesium wnl      Acute renal failure  -resolved  Likely related to IVVD r/t NV  Improving with IVF  Creatinine currently 1.0 from 1.7.  Continue to follow     Elevated troponin  EKG reviewed by cardiology and unchanged  Suspect demand ischemia r/t vomiting, renal failure, ?GI   Trending down  Check echocardiogram  Cardiology consulted    06/4/23  -ECHO showed left ventricle is mildly enlarged with severely decreased systolic function. Ejection fraction is 20%. There is grade I diastolic dysfunction consistent with impaired relaxation. There is a small spherical solid possible thrombus. The thrombus is fixed and located in the apex. There are segmental wall motion abnormalities.   -He was started on heparin drip for ACS. Will need LHC once renal function improves. He will likely have R/LHC on Monday with Dr. French.   -6/5/23- echo results and need for LifeVest discussed with patient per Cardiology. R/LHC planned for tomorrow per Dr. French- heparin infusion continued-serial results reviewed with downward trend  -6/6/23- cath completed with LAD stent replaced       Vomiting  --see plan for Elevated total Bilirubin.  Resolved.  -IV hydration weaned- pt tolerating oral intake       Hypertension  BP stable.  -metoprolol resumed   -IV Hydralazine as needed for PRN for SBP > 170       VTE Risk Mitigation (From admission, onward)         Ordered     warfarin (COUMADIN) tablet 5 mg  Daily         06/07/23 1050     enoxaparin injection 80 mg  Every 12 hours (non-standard times)         06/07/23 1043                Discharge Planning   GAUDENCIO: 6/7/2023     Code Status: Not on file   Is the patient medically ready for discharge?:     Reason for patient still in hospital (select all that apply): Patient trending condition, Laboratory test, Treatment and Consult recommendations  Discharge Plan A: Home   Discharge Delays:  None known at this time              Bhumika Purdy NP  Department of Hospital Medicine   '[facility]

## 2023-06-07 NOTE — SUBJECTIVE & OBJECTIVE
Interval History: pt in bed upon exam with no additional complaints. Need for Coumadin upon discharge discussed. Cardiology following. Plan of care discussed with understanding verbalized. Pharmacy consulted for Coumadin dosing.     Review of Systems   Constitutional:  Negative for chills, diaphoresis, fatigue and fever.   HENT:  Negative for drooling, ear pain, rhinorrhea and sore throat.    Eyes: Negative.    Respiratory:  Negative for cough, shortness of breath and wheezing.    Cardiovascular:  Negative for palpitations and leg swelling.   Gastrointestinal:  Negative for abdominal pain, constipation, diarrhea, nausea and vomiting.   Endocrine: Negative.    Genitourinary:  Negative for dysuria, hematuria and urgency.   Musculoskeletal: Negative.    Skin:  Negative for color change and wound.   Allergic/Immunologic: Negative.    Neurological:  Negative for dizziness, syncope and speech difficulty.   Hematological: Negative.    Psychiatric/Behavioral: Negative.  Negative for behavioral problems and confusion. The patient is not nervous/anxious.    Objective:     Vital Signs (Most Recent):  Temp: 98 °F (36.7 °C) (06/07/23 1137)  Pulse: 64 (06/07/23 1322)  Resp: 18 (06/07/23 1137)  BP: 132/71 (06/07/23 1137)  SpO2: 99 % (06/07/23 1137) Vital Signs (24h Range):  Temp:  [97.6 °F (36.4 °C)-98.8 °F (37.1 °C)] 98 °F (36.7 °C)  Pulse:  [57-74] 64  Resp:  [10-18] 18  SpO2:  [97 %-100 %] 99 %  BP: (119-195)/(34-76) 132/71     Weight: 75.8 kg (167 lb)  Body mass index is 22.65 kg/m².    Intake/Output Summary (Last 24 hours) at 6/7/2023 1338  Last data filed at 6/7/2023 1038  Gross per 24 hour   Intake 120 ml   Output --   Net 120 ml         Physical Exam  Vitals and nursing note reviewed.   Constitutional:       General: He is not in acute distress.     Appearance: He is well-developed.   HENT:      Head: Normocephalic and atraumatic.      Mouth/Throat:      Mouth: Mucous membranes are moist.   Eyes:      Extraocular  Movements: Extraocular movements intact.      Conjunctiva/sclera: Conjunctivae normal.   Cardiovascular:      Rate and Rhythm: Normal rate and regular rhythm.      Heart sounds: Normal heart sounds.   Pulmonary:      Effort: Pulmonary effort is normal. No respiratory distress.      Breath sounds: Normal breath sounds.   Abdominal:      General: Bowel sounds are normal. There is no distension.      Palpations: Abdomen is soft.      Tenderness: There is no abdominal tenderness.   Genitourinary:     Comments: Deferred   Musculoskeletal:         General: Normal range of motion.      Cervical back: Normal range of motion and neck supple. No edema.      Comments: L radial site intact    Skin:     General: Skin is warm and dry.      Capillary Refill: Capillary refill takes less than 2 seconds.   Neurological:      General: No focal deficit present.      Mental Status: He is alert and oriented to person, place, and time. Mental status is at baseline.   Psychiatric:         Mood and Affect: Mood normal.         Behavior: Behavior normal.           Significant Labs: All pertinent labs within the past 24 hours have been reviewed.  CBC:   Recent Labs   Lab 06/06/23  0612 06/07/23  0643   WBC 3.46* 3.90   HGB 11.1* 10.8*   HCT 31.9* 30.3*   * 149*     CMP:   Recent Labs   Lab 06/05/23  1628 06/06/23  0612 06/07/23  0643   * 135* 135*   K 3.5 3.7 3.7   CL 95 97 102   CO2 31* 28 26   GLU 88 80 98   BUN 17 17 14   CREATININE 1.1 1.0 1.0   CALCIUM 8.9 8.9 8.5*   PROT  --  5.3* 5.1*   ALBUMIN  --  2.8* 2.6*   BILITOT  --  0.9 0.8   ALKPHOS  --  90 92   AST  --  30 30   ALT  --  36 31   ANIONGAP 8 10 7*     Magnesium:   Recent Labs   Lab 06/06/23  0612 06/07/23  0643   MG 1.9 1.7       Significant Imaging: I have reviewed all pertinent imaging results/findings within the past 24 hours.

## 2023-06-07 NOTE — PLAN OF CARE
Patient resting in bed, remains free of falls and injuries this shift. VSS. No obvious s/sx of distress noted. No complaints of pain this shift. Pharmacy came and completed coumadin teaching with the patient. Continuous cardiac monitor in place as ordered. POC reviewed with the patient, verbalized understanding. No further needs at this time, call light within reach. Continue POC as ordered, chart check completed and all orders reviewed.

## 2023-06-07 NOTE — SUBJECTIVE & OBJECTIVE
Interval History: stable and will probably discharge tomorrow after starting coumadin today.    Review of Systems   Constitutional: Negative for chills, diaphoresis, night sweats, weight gain and weight loss.   HENT:  Negative for congestion, hoarse voice, sore throat and stridor.    Eyes:  Negative for double vision and pain.   Cardiovascular:  Negative for chest pain, claudication, cyanosis, dyspnea on exertion, irregular heartbeat, leg swelling, near-syncope, orthopnea, palpitations, paroxysmal nocturnal dyspnea and syncope.   Respiratory:  Negative for cough, hemoptysis, shortness of breath, sleep disturbances due to breathing, snoring, sputum production and wheezing.    Endocrine: Negative for cold intolerance, heat intolerance and polydipsia.   Hematologic/Lymphatic: Negative for bleeding problem. Does not bruise/bleed easily.   Skin:  Negative for color change, dry skin and rash.   Musculoskeletal:  Negative for joint swelling and muscle cramps.   Gastrointestinal:  Negative for bloating, abdominal pain, constipation, diarrhea, dysphagia, melena, nausea and vomiting.   Genitourinary:  Negative for flank pain and urgency.   Neurological:  Negative for dizziness, focal weakness, headaches, light-headedness, loss of balance, seizures and weakness.   Psychiatric/Behavioral:  Negative for altered mental status and memory loss. The patient is not nervous/anxious.    Objective:     Vital Signs (Most Recent):  Temp: 98 °F (36.7 °C) (06/07/23 1137)  Pulse: (!) 57 (06/07/23 1137)  Resp: 18 (06/07/23 1137)  BP: 132/71 (06/07/23 1137)  SpO2: 99 % (06/07/23 1137) Vital Signs (24h Range):  Temp:  [97.6 °F (36.4 °C)-98.8 °F (37.1 °C)] 98 °F (36.7 °C)  Pulse:  [54-74] 57  Resp:  [10-18] 18  SpO2:  [97 %-100 %] 99 %  BP: (113-195)/(34-76) 132/71     Weight: 75.8 kg (167 lb)  Body mass index is 22.65 kg/m².     SpO2: 99 %         Intake/Output Summary (Last 24 hours) at 6/7/2023 1237  Last data filed at 6/7/2023 1038  Gross  per 24 hour   Intake 120 ml   Output --   Net 120 ml       Lines/Drains/Airways       Peripheral Intravenous Line  Duration                  Peripheral IV - Single Lumen 06/04/23 1650 22 G Posterior;Right Forearm 2 days         Peripheral IV - Single Lumen 06/04/23 2300 22 G Anterior;Distal;Right Upper Arm 2 days                       Physical Exam  Eyes:      Pupils: Pupils are equal, round, and reactive to light.   Neck:      Trachea: No tracheal deviation.   Cardiovascular:      Rate and Rhythm: Normal rate and regular rhythm.      Pulses: Intact distal pulses.           Carotid pulses are 2+ on the right side and 2+ on the left side.       Radial pulses are 2+ on the right side and 2+ on the left side.        Femoral pulses are 2+ on the right side and 2+ on the left side.       Popliteal pulses are 2+ on the right side and 2+ on the left side.        Dorsalis pedis pulses are 2+ on the right side and 2+ on the left side.        Posterior tibial pulses are 2+ on the right side and 2+ on the left side.      Heart sounds: Normal heart sounds. No murmur heard.    No friction rub. No gallop.   Pulmonary:      Effort: Pulmonary effort is normal. No respiratory distress.      Breath sounds: Normal breath sounds. No stridor. No wheezing or rales.   Chest:      Chest wall: No tenderness.   Abdominal:      General: There is no distension.      Tenderness: There is no abdominal tenderness. There is no rebound.   Musculoskeletal:         General: No tenderness.      Cervical back: Normal range of motion.   Skin:     General: Skin is warm and dry.   Neurological:      Mental Status: He is alert and oriented to person, place, and time.          Significant Labs: CMP   Recent Labs   Lab 06/05/23  1628 06/06/23  0612 06/07/23  0643   * 135* 135*   K 3.5 3.7 3.7   CL 95 97 102   CO2 31* 28 26   GLU 88 80 98   BUN 17 17 14   CREATININE 1.1 1.0 1.0   CALCIUM 8.9 8.9 8.5*   PROT  --  5.3* 5.1*   ALBUMIN  --  2.8* 2.6*    BILITOT  --  0.9 0.8   ALKPHOS  --  90 92   AST  --  30 30   ALT  --  36 31   ANIONGAP 8 10 7*    and CBC   Recent Labs   Lab 06/06/23  0612 06/07/23  0643   WBC 3.46* 3.90   HGB 11.1* 10.8*   HCT 31.9* 30.3*   * 149*       Significant Imaging: Echocardiogram: Transthoracic echo (TTE) complete (Cupid Only):   Results for orders placed or performed during the hospital encounter of 06/02/23   Echo   Result Value Ref Range    BSA 1.96 m2    TDI SEPTAL 0.06 m/s    LV LATERAL E/E' RATIO 7.40 m/s    LV SEPTAL E/E' RATIO 6.17 m/s    LA WIDTH 3.20 cm    IVC diameter 1.91 cm    Left Ventricular Outflow Tract Mean Velocity 0.97 cm/s    Left Ventricular Outflow Tract Mean Gradient 4.25 mmHg    Pulmonary Valve Mean Velocity 0.77 m/s    TDI LATERAL 0.05 m/s    PV PEAK VELOCITY 1.02 cm/s    LVIDd 4.85 3.5 - 6.0 cm    IVS 1.53 (A) 0.6 - 1.1 cm    Posterior Wall 1.51 (A) 0.6 - 1.1 cm    Ao root annulus 3.54 cm    LVIDs 4.11 (A) 2.1 - 4.0 cm    FS 15 28 - 44 %    STJ 3.52 cm    Ascending aorta 3.61 cm    LV mass 314.33 g    RVDD 3.92 cm    TAPSE 2.30 cm    RV S' 0.02 cm/s    Left Ventricle Relative Wall Thickness 0.62 cm    AV mean gradient 8 mmHg    AV valve area 3.28 cm2    AV Velocity Ratio 0.70     AV index (prosthetic) 0.79     E/A ratio 0.80     Mean e' 0.06 m/s    E wave deceleration time 294.04 msec    IVRT 159.85 msec    Pulm vein S/D ratio 1.81     LVOT diameter 2.30 cm    LVOT area 4.2 cm2    LVOT peak stanley 1.35 m/s    LVOT peak VTI 24.40 cm    Ao peak stanley 1.94 m/s    Ao VTI 30.9 cm    RVOT peak stanley 0.76 m/s    RVOT peak VTI 16.1 cm    LVOT stroke volume 101.32 cm3    AV peak gradient 15 mmHg    PV mean gradient 1.21 mmHg    E/E' ratio 6.73 m/s    MV Peak E Stanley 0.37 m/s    TR Max Stanley 2.05 m/s    MV Peak A Stanley 0.46 m/s    PV Peak S Stanley 0.47 m/s    PV Peak D Stanley 0.26 m/s    LV Systolic Volume 74.60 mL    LV Systolic Volume Index 37.9 mL/m2    LV Diastolic Volume 110.03 mL    LV Diastolic Volume Index 55.85 mL/m2     LV Mass Index 160 g/m2    RA Major Axis 4.58 cm    Left Atrium Minor Axis 4.30 cm    Left Atrium Major Axis 4.29 cm    Triscuspid Valve Regurgitation Peak Gradient 17 mmHg    LA Volume Index (Mod) 17.6 mL/m2    LA volume (mod) 34.68 cm3    Right Atrial Pressure (from IVC) 3 mmHg    EF 20 %    TV rest pulmonary artery pressure 20 mmHg    Narrative    · The left ventricle is mildly enlarged with concentric hypertrophy and   severely decreased systolic function.  · The estimated ejection fraction is 20%.  · Grade I left ventricular diastolic dysfunction.  · There are segmental left ventricular wall motion abnormalities.  · Normal right ventricular size with normal right ventricular systolic   function.  · Normal central venous pressure (3 mmHg).  · The estimated PA systolic pressure is 20 mmHg.  · A small spherical solid left ventricular thrombus is possible. The   thrombus is fixed and located in the apex.

## 2023-06-07 NOTE — PROGRESS NOTES
[facility]  Cardiology  Progress Note    Patient Name: Michael Hopper  MRN: [id number]  Admission Date: 6/2/2023  Hospital Length of Stay: 2 days  Code Status: No Order   Attending Physician: Cesia Phan MD   Primary Care Physician: Nkechi Madrid NP  Expected Discharge Date: 6/7/2023  Principal Problem:Serum total bilirubin elevated    Subjective:     Hospital Course:   6/5/23-Patient seen and examined in room. Patient denies CP today. Plan Right and LHC tomorrow.    6/6/23-Patient seen and examined in room. Post re stent of LAD and stable.  Continue all medications    6/7/23-Patient seen and examined in room. No complaints. Patient to take ASA for 2 weeks and then d/c. Patient to continue brilinta and coumadin with lovenox bridging.      Interval History: stable and will probably discharge tomorrow after starting coumadin today.    Review of Systems   Constitutional: Negative for chills, diaphoresis, night sweats, weight gain and weight loss.   HENT:  Negative for congestion, hoarse voice, sore throat and stridor.    Eyes:  Negative for double vision and pain.   Cardiovascular:  Negative for chest pain, claudication, cyanosis, dyspnea on exertion, irregular heartbeat, leg swelling, near-syncope, orthopnea, palpitations, paroxysmal nocturnal dyspnea and syncope.   Respiratory:  Negative for cough, hemoptysis, shortness of breath, sleep disturbances due to breathing, snoring, sputum production and wheezing.    Endocrine: Negative for cold intolerance, heat intolerance and polydipsia.   Hematologic/Lymphatic: Negative for bleeding problem. Does not bruise/bleed easily.   Skin:  Negative for color change, dry skin and rash.   Musculoskeletal:  Negative for joint swelling and muscle cramps.   Gastrointestinal:  Negative for bloating, abdominal pain, constipation, diarrhea, dysphagia, melena, nausea and vomiting.   Genitourinary:  Negative for flank pain and urgency.   Neurological:  Negative for  dizziness, focal weakness, headaches, light-headedness, loss of balance, seizures and weakness.   Psychiatric/Behavioral:  Negative for altered mental status and memory loss. The patient is not nervous/anxious.    Objective:     Vital Signs (Most Recent):  Temp: 98 °F (36.7 °C) (06/07/23 1137)  Pulse: (!) 57 (06/07/23 1137)  Resp: 18 (06/07/23 1137)  BP: 132/71 (06/07/23 1137)  SpO2: 99 % (06/07/23 1137) Vital Signs (24h Range):  Temp:  [97.6 °F (36.4 °C)-98.8 °F (37.1 °C)] 98 °F (36.7 °C)  Pulse:  [54-74] 57  Resp:  [10-18] 18  SpO2:  [97 %-100 %] 99 %  BP: (113-195)/(34-76) 132/71     Weight: 75.8 kg (167 lb)  Body mass index is 22.65 kg/m².     SpO2: 99 %         Intake/Output Summary (Last 24 hours) at 6/7/2023 1237  Last data filed at 6/7/2023 1038  Gross per 24 hour   Intake 120 ml   Output --   Net 120 ml       Lines/Drains/Airways       Peripheral Intravenous Line  Duration                  Peripheral IV - Single Lumen 06/04/23 1650 22 G Posterior;Right Forearm 2 days         Peripheral IV - Single Lumen 06/04/23 2300 22 G Anterior;Distal;Right Upper Arm 2 days                       Physical Exam  Eyes:      Pupils: Pupils are equal, round, and reactive to light.   Neck:      Trachea: No tracheal deviation.   Cardiovascular:      Rate and Rhythm: Normal rate and regular rhythm.      Pulses: Intact distal pulses.           Carotid pulses are 2+ on the right side and 2+ on the left side.       Radial pulses are 2+ on the right side and 2+ on the left side.        Femoral pulses are 2+ on the right side and 2+ on the left side.       Popliteal pulses are 2+ on the right side and 2+ on the left side.        Dorsalis pedis pulses are 2+ on the right side and 2+ on the left side.        Posterior tibial pulses are 2+ on the right side and 2+ on the left side.      Heart sounds: Normal heart sounds. No murmur heard.    No friction rub. No gallop.   Pulmonary:      Effort: Pulmonary effort is normal. No respiratory  distress.      Breath sounds: Normal breath sounds. No stridor. No wheezing or rales.   Chest:      Chest wall: No tenderness.   Abdominal:      General: There is no distension.      Tenderness: There is no abdominal tenderness. There is no rebound.   Musculoskeletal:         General: No tenderness.      Cervical back: Normal range of motion.   Skin:     General: Skin is warm and dry.   Neurological:      Mental Status: He is alert and oriented to person, place, and time.          Significant Labs: CMP   Recent Labs   Lab 06/05/23  1628 06/06/23  0612 06/07/23  0643   * 135* 135*   K 3.5 3.7 3.7   CL 95 97 102   CO2 31* 28 26   GLU 88 80 98   BUN 17 17 14   CREATININE 1.1 1.0 1.0   CALCIUM 8.9 8.9 8.5*   PROT  --  5.3* 5.1*   ALBUMIN  --  2.8* 2.6*   BILITOT  --  0.9 0.8   ALKPHOS  --  90 92   AST  --  30 30   ALT  --  36 31   ANIONGAP 8 10 7*    and CBC   Recent Labs   Lab 06/06/23  0612 06/07/23  0643   WBC 3.46* 3.90   HGB 11.1* 10.8*   HCT 31.9* 30.3*   * 149*       Significant Imaging: Echocardiogram: Transthoracic echo (TTE) complete (Cupid Only):   Results for orders placed or performed during the hospital encounter of 06/02/23   Echo   Result Value Ref Range    BSA 1.96 m2    TDI SEPTAL 0.06 m/s    LV LATERAL E/E' RATIO 7.40 m/s    LV SEPTAL E/E' RATIO 6.17 m/s    LA WIDTH 3.20 cm    IVC diameter 1.91 cm    Left Ventricular Outflow Tract Mean Velocity 0.97 cm/s    Left Ventricular Outflow Tract Mean Gradient 4.25 mmHg    Pulmonary Valve Mean Velocity 0.77 m/s    TDI LATERAL 0.05 m/s    PV PEAK VELOCITY 1.02 cm/s    LVIDd 4.85 3.5 - 6.0 cm    IVS 1.53 (A) 0.6 - 1.1 cm    Posterior Wall 1.51 (A) 0.6 - 1.1 cm    Ao root annulus 3.54 cm    LVIDs 4.11 (A) 2.1 - 4.0 cm    FS 15 28 - 44 %    STJ 3.52 cm    Ascending aorta 3.61 cm    LV mass 314.33 g    RVDD 3.92 cm    TAPSE 2.30 cm    RV S' 0.02 cm/s    Left Ventricle Relative Wall Thickness 0.62 cm    AV mean gradient 8 mmHg    AV valve area 3.28 cm2     AV Velocity Ratio 0.70     AV index (prosthetic) 0.79     E/A ratio 0.80     Mean e' 0.06 m/s    E wave deceleration time 294.04 msec    IVRT 159.85 msec    Pulm vein S/D ratio 1.81     LVOT diameter 2.30 cm    LVOT area 4.2 cm2    LVOT peak stanley 1.35 m/s    LVOT peak VTI 24.40 cm    Ao peak stanley 1.94 m/s    Ao VTI 30.9 cm    RVOT peak stanley 0.76 m/s    RVOT peak VTI 16.1 cm    LVOT stroke volume 101.32 cm3    AV peak gradient 15 mmHg    PV mean gradient 1.21 mmHg    E/E' ratio 6.73 m/s    MV Peak E Stanley 0.37 m/s    TR Max Stanley 2.05 m/s    MV Peak A Stanley 0.46 m/s    PV Peak S Stanley 0.47 m/s    PV Peak D Stanley 0.26 m/s    LV Systolic Volume 74.60 mL    LV Systolic Volume Index 37.9 mL/m2    LV Diastolic Volume 110.03 mL    LV Diastolic Volume Index 55.85 mL/m2    LV Mass Index 160 g/m2    RA Major Axis 4.58 cm    Left Atrium Minor Axis 4.30 cm    Left Atrium Major Axis 4.29 cm    Triscuspid Valve Regurgitation Peak Gradient 17 mmHg    LA Volume Index (Mod) 17.6 mL/m2    LA volume (mod) 34.68 cm3    Right Atrial Pressure (from IVC) 3 mmHg    EF 20 %    TV rest pulmonary artery pressure 20 mmHg    Narrative    · The left ventricle is mildly enlarged with concentric hypertrophy and   severely decreased systolic function.  · The estimated ejection fraction is 20%.  · Grade I left ventricular diastolic dysfunction.  · There are segmental left ventricular wall motion abnormalities.  · Normal right ventricular size with normal right ventricular systolic   function.  · Normal central venous pressure (3 mmHg).  · The estimated PA systolic pressure is 20 mmHg.  · A small spherical solid left ventricular thrombus is possible. The   thrombus is fixed and located in the apex.        Assessment and Plan:     Brief HPI: interval pain free and adjust anticoagulation today    * Serum total bilirubin elevated  S/p abd u/s - f/u with GI/hepatology     Left ventricular apical thrombus  Cont heparin drip   Rec anticoag upon DC x min 3 months  with repeat ECHO     Acute on chronic combined systolic and diastolic congestive heart failure  Not on diuretics now due to n/v  Monitor volume status, BNP ordered   Will start OMT once n/v resolves and renal function stable   Discuss Lifevest upon DC      NSTEMI (non-ST elevated myocardial infarction)  Cont ACS tx - asa, statin, bb, heparin drip  R/LHC Monday with Dr. French pending stable renal function  Reg GI eval for N/V in case pt has PCI needing to remain on DAPT    6/6/23 Restent of LAD    6/7/23-Patient pain free and will continue ASA, brillinta and coumadin    Hypokalemia  Replete K > 4, Mg > 2    Acute renal failure  S/p IVF improving    Elevated troponin  See plan for NSTEMI    Vomiting  Rec GI eval if no improvement     Hypertension  Titrate meds         VTE Risk Mitigation (From admission, onward)         Ordered     warfarin (COUMADIN) tablet 5 mg  Daily         06/07/23 1050     enoxaparin injection 80 mg  Every 12 hours (non-standard times)         06/07/23 1043                Glenroy Nolen MD  Cardiology  [facility]

## 2023-06-07 NOTE — ASSESSMENT & PLAN NOTE
Cont ACS tx - asa, statin, bb, heparin drip  R/LHC Monday with Dr. French pending stable renal function  Reg GI eval for N/V in case pt has PCI needing to remain on DAPT    6/6/23 Restent of LAD    6/7/23-Patient pain free and will continue ASA, brillinta and coumadin

## 2023-06-07 NOTE — ASSESSMENT & PLAN NOTE
-cardiology following  -thrombus fixed and located and apex per echo  -heparin infusion held   -Brillinta initiated post cath and pharmacy consulted for coumadin dosing   -Lovenox initiated for bridge   -echo results reviewed

## 2023-06-08 ENCOUNTER — TELEPHONE (OUTPATIENT)
Dept: CARDIOLOGY | Facility: HOSPITAL | Age: 58
End: 2023-06-08
Payer: MEDICAID

## 2023-06-08 VITALS
RESPIRATION RATE: 16 BRPM | TEMPERATURE: 98 F | OXYGEN SATURATION: 100 % | HEART RATE: 57 BPM | BODY MASS INDEX: 22.64 KG/M2 | HEIGHT: 72 IN | SYSTOLIC BLOOD PRESSURE: 140 MMHG | DIASTOLIC BLOOD PRESSURE: 90 MMHG | WEIGHT: 167.13 LBS

## 2023-06-08 LAB
ALBUMIN SERPL BCP-MCNC: 2.8 G/DL (ref 3.5–5.2)
ALP SERPL-CCNC: 73 U/L (ref 55–135)
ALT SERPL W/O P-5'-P-CCNC: 33 U/L (ref 10–44)
ANION GAP SERPL CALC-SCNC: 7 MMOL/L (ref 8–16)
AST SERPL-CCNC: 29 U/L (ref 10–40)
BILIRUB SERPL-MCNC: 0.8 MG/DL (ref 0.1–1)
BUN SERPL-MCNC: 13 MG/DL (ref 6–20)
CALCIUM SERPL-MCNC: 8.9 MG/DL (ref 8.7–10.5)
CHLORIDE SERPL-SCNC: 107 MMOL/L (ref 95–110)
CO2 SERPL-SCNC: 25 MMOL/L (ref 23–29)
CREAT SERPL-MCNC: 1 MG/DL (ref 0.5–1.4)
EST. GFR  (NO RACE VARIABLE): >60 ML/MIN/1.73 M^2
GLUCOSE SERPL-MCNC: 97 MG/DL (ref 70–110)
INR PPP: 1.1 (ref 0.8–1.2)
MAGNESIUM SERPL-MCNC: 1.7 MG/DL (ref 1.6–2.6)
POTASSIUM SERPL-SCNC: 4.3 MMOL/L (ref 3.5–5.1)
PROT SERPL-MCNC: 5.5 G/DL (ref 6–8.4)
PROTHROMBIN TIME: 11.6 SEC (ref 9–12.5)
SODIUM SERPL-SCNC: 139 MMOL/L (ref 136–145)

## 2023-06-08 PROCEDURE — 85610 PROTHROMBIN TIME: CPT | Performed by: INTERNAL MEDICINE

## 2023-06-08 PROCEDURE — 83735 ASSAY OF MAGNESIUM: CPT | Performed by: INTERNAL MEDICINE

## 2023-06-08 PROCEDURE — 25000003 PHARM REV CODE 250: Performed by: PHYSICIAN ASSISTANT

## 2023-06-08 PROCEDURE — 25000003 PHARM REV CODE 250: Performed by: INTERNAL MEDICINE

## 2023-06-08 PROCEDURE — 80053 COMPREHEN METABOLIC PANEL: CPT | Performed by: INTERNAL MEDICINE

## 2023-06-08 PROCEDURE — 36415 COLL VENOUS BLD VENIPUNCTURE: CPT | Performed by: INTERNAL MEDICINE

## 2023-06-08 RX ORDER — METOPROLOL SUCCINATE 25 MG/1
25 TABLET, EXTENDED RELEASE ORAL DAILY
Status: DISCONTINUED | OUTPATIENT
Start: 2023-06-08 | End: 2023-06-08 | Stop reason: HOSPADM

## 2023-06-08 RX ORDER — LOSARTAN POTASSIUM 25 MG/1
25 TABLET ORAL DAILY
Status: DISCONTINUED | OUTPATIENT
Start: 2023-06-08 | End: 2023-06-08 | Stop reason: HOSPADM

## 2023-06-08 RX ORDER — METOPROLOL SUCCINATE 25 MG/1
25 TABLET, EXTENDED RELEASE ORAL DAILY
Qty: 30 TABLET | Refills: 0 | Status: SHIPPED | OUTPATIENT
Start: 2023-06-09 | End: 2023-07-09

## 2023-06-08 RX ORDER — LOSARTAN POTASSIUM 25 MG/1
25 TABLET ORAL DAILY
Qty: 30 TABLET | Refills: 0 | Status: SHIPPED | OUTPATIENT
Start: 2023-06-09 | End: 2023-06-21

## 2023-06-08 RX ADMIN — ATORVASTATIN CALCIUM 80 MG: 40 TABLET, FILM COATED ORAL at 09:06

## 2023-06-08 RX ADMIN — METOPROLOL SUCCINATE 25 MG: 25 TABLET, EXTENDED RELEASE ORAL at 09:06

## 2023-06-08 RX ADMIN — TICAGRELOR 90 MG: 90 TABLET ORAL at 09:06

## 2023-06-08 RX ADMIN — LOSARTAN POTASSIUM 25 MG: 25 TABLET, FILM COATED ORAL at 09:06

## 2023-06-08 RX ADMIN — TRAMADOL HYDROCHLORIDE 50 MG: 50 TABLET, COATED ORAL at 12:06

## 2023-06-08 RX ADMIN — ASPIRIN 81 MG: 81 TABLET, COATED ORAL at 09:06

## 2023-06-08 RX ADMIN — APIXABAN 5 MG: 2.5 TABLET, FILM COATED ORAL at 09:06

## 2023-06-08 NOTE — PLAN OF CARE
Pt. Received awake and alert. NADN. Family at the bedside. Plan of care discussed; verbalized understanding. Plan of care ongoing.

## 2023-06-08 NOTE — DISCHARGE SUMMARY
"[facility] Medicine  Discharge Summary      Patient Name: Michael Hopper  MRN: [id number]  NEELIMA: [id number]  Patient Class: IP- Inpatient  Admission Date: 6/2/2023  Hospital Length of Stay: 3 days  Discharge Date and Time: 6/8/2023 12:07 PM  Attending Physician: Dr. Cesia Phan   Discharging Provider: Bhumika Purdy NP  Primary Care Provider: Nkechi Madrid NP    Primary Care Team: Networked reference to record PCT     HPI:   Michael Hopper is a 57 year old male with history of CAD and hypertension who presents to ED for further evaluation of nausea and vomiting that began three days ago. He came to the ED due dizziness and lightheadedness upon stand and "cloudy thinking". He admits to some generalized abdominal pain. His last BM was one week ago. He denies fever, chest pain, or palpitations. He denies opioid use.     In ED, CXR negative. Labs revealed elevated liver enzymes and elevated Total Bilirubin. Abdominal ultrasound and lipase are pending. Initial troponin 0.159, trending down 0.131. Case has been discussed with  Cardiology. Patient will be placed in observation under the care of [facility] medicine.       Procedure(s) (LRB):  CATHETERIZATION, HEART, BOTH LEFT AND RIGHT (N/A)  Ultrasound-coronary (Left)  Angioplasty-coronary  Stent, Drug Eluting, Single Vessel, Coronary (Left)      Hospital Course:   Michael Hopper is a 57  year old male place on observation for elevated t.bilirubin. Abdominal US showed sludge filled gallbladder, no gallstones. No evidence of cholecystitis. No biliary ductal obstruction. Fatty liver. T. Bilirubin and liver enzymes improving with IVF. Cardiology consulted due to elevated troponin. ECHO showed left ventricle is mildly enlarged with severely decreased systolic function. Ejection fraction is 20%. There is grade I diastolic dysfunction consistent with impaired relaxation. There is a small spherical solid possible thrombus. The thrombus is fixed and located in the " apex. There are segmental wall motion abnormalities. He was started on heparin drip for ACS. Will need LHC once renal function improves. On 6/5/23, R/LHC on 6/6/23 with Dr. French. Heparin infusion and current plan of care continued. Hypokalemia noted with K added to IVF. Kidney function normalized. H/H stable upon repeat evaluation. T. Bili improved and LFTs normalized. Pt verbalized symptom improvement upon exam with N/V resolved. ASA, Statin, and Plavix continued- beta blocker held secondary to bradycardia- will initiate at lower dose as appropriate. Lifevest upon discharge discussed with patient per Cardiology. On 6/6/23, pt taken to Cath lab and LAD stent replaced per Cardiology. BP elevated with Lopressor resumed and Brilinta initiated. On 6/7/23, Case discussed with Cardiology and Lovenox bridge initiated and pharmacy consulted for Coumadin due to thrombus at apex. Life vest to be obtained outpatient. Discharge anticipated once INR trending upward and patient able to demonstrate administration of Lovenox injections. Vital signs stable. Plan of care discussed with patient per  and Cardiology with understanding verbalized. On 6/8/23, patient transition to Eliquis for apical thrombus.  Case discussed with Cardiology and patient cleared for discharge with recommendations obtained.  Vital signs stable with no signs of acute distress witnessed or reported. Patient seen and examined in deemed medically stable for discharge to home.  Medications reconciled and aspirin, Brilinta, Eliquis, and Cozaar prescribed.  Patient instructed to maintain compliance with low-sodium diet, prescribed medications, and follow-up appointments with understanding verbalized.  Patient instructed to follow-up with PCP and Cardiology upon discharge further evaluation.          Goals of Care Treatment Preferences:         Consults:   Consults (From admission, onward)        Status Ordering Provider     Inpatient consult to Cardiology  Once         Provider:  (Not yet assigned)    KATIE Morales          Final Active Diagnoses:    Diagnosis Date Noted POA    PRINCIPAL PROBLEM:  Serum total bilirubin elevated [R17]  Yes    Abnormal EKG [R94.31] 06/05/2023 Yes    Dehydration [E86.0] 06/05/2023 Yes    NSTEMI (non-ST elevated myocardial infarction) [I21.4] 06/03/2023 Yes    Acute on chronic combined systolic and diastolic congestive heart failure [I50.43] 06/03/2023 Yes    Left ventricular apical thrombus [I51.3] 06/03/2023 Yes    Hypertension [I10]  Yes    Vomiting [R11.10]  Yes    Elevated troponin [R77.8]  Yes    Acute renal failure [N17.9]  Yes    Hypokalemia [E87.6]  Yes      Problems Resolved During this Admission:       Discharged Condition: stable    Disposition: Home or Self Care    Follow Up:   Follow-up Information     Dr. Quijano Say. Go to.    Why: PCP appointment scheduled for 6/28/23 at 9am.     Please bring your Medicaid insurance card.  Contact information:  [address] [facility] [address] [phone]           Roxanna French MD Follow up in 2 week(s).    Specialties: Interventional Cardiology, Cardiology  Why: -hospital follow up  Contact information:  [address]  [place] [address] [phone]                       Patient Instructions:   No discharge procedures on file.    Significant Diagnostic Studies: Labs: All labs within the past 24 hours have been reviewed    Pending Diagnostic Studies:     None         Medications:  Reconciled Home Medications:      Medication List      START taking these medications    aspirin 81 MG EC tablet  Commonly known as: ECOTRIN  Take 1 tablet (81 mg total) by mouth once daily. for 14 days     BRILINTA 90 mg tablet  Generic drug: ticagrelor  Take 1 tablet (90 mg total) by mouth 2 (two) times daily.     ELIQUIS 5 mg Tab  Generic drug: apixaban  Take 1 tablet (5 mg total) by mouth 2 (two) times daily.     losartan 25 MG tablet  Commonly known as:  COZAAR  Take 1 tablet (25 mg total) by mouth once daily.        CONTINUE taking these medications    atorvastatin 10 MG tablet  Commonly known as: LIPITOR  Take 10 mg by mouth once daily.     metoprolol succinate 25 MG 24 hr tablet  Commonly known as: TOPROL-XL  Take 1 tablet (25 mg total) by mouth once daily.     traMADoL 50 mg tablet  Commonly known as: ULTRAM  Take 1 tablet (50 mg total) by mouth every 6 (six) hours as needed for Pain.        STOP taking these medications    clopidogreL 75 mg tablet  Commonly known as: PLAVIX     diclofenac 75 MG EC tablet  Commonly known as: VOLTAREN     ibuprofen 800 MG tablet  Commonly known as: ADVIL,MOTRIN     oxyCODONE-acetaminophen  mg per tablet  Commonly known as: PERCOCET     penicillin v potassium 500 MG tablet  Commonly known as: VEETID            Indwelling Lines/Drains at time of discharge:   Lines/Drains/Airways     None                 Time spent on the discharge of patient: > 40 minutes         Bhumika Purdy NP  Department of Hospital Medicine  [facility]

## 2023-06-08 NOTE — TELEPHONE ENCOUNTER
His insurance is pending. Needs f/u next week, can do nurse visit if necessary.    Please arrange.    Thanks

## 2023-06-08 NOTE — PROGRESS NOTES
"[facility] - Med Surg  Adult Nutrition  Progress Note    SUMMARY       Recommendations  1. Recommend pt continues Cardiac; Coumadin Restriction diet as medically appropriate.   2. Recommend to weigh pt daily.    Goals:   1. Pt will continue to consume % of energy needs by RD follow up.  2. Pt BP will improve to WNL by RD follow up  Nutrition Goal Status: new  Communication of RD Recs: other (comment) (POC: Sticky note)  Andres Ni, Registration Eligible, Provisional LDN    Assessment and Plan    Nutrition Problem  Undesirable food choices    Related to (etiology):   Pt reported food recall    Signs and Symptoms (as evidenced by):   Elevated BP "140/90"    Interventions(treatment strategy):  1. Sodium/ Fat modified diet  2. Healthy heart education    Nutrition Diagnosis Status:   New       Malnutrition Assessment     Skin (Micronutrient): dry                                 Reason for Assessment    Reason For Assessment: consult  Diagnosis: other (see comments) (Serum total bilirubin elevated)  Relevant Medical History: CAD, HTN, CHF  Interdisciplinary Rounds: did not attend  General Information Comments:   6/8: 57 y.o male admitted v/ active principal problem of Serum total bilirubin elevated. Pt states that he has a good appetite and is consuming % of his meals. 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 The pt states that he prepares majority of his meals in the microwave unless he is consuming fresh fruits and vegetables. NFPE not performed, pt appears well nourished. Pt reports that he has had an unintentional wt loss 24-33 lbs. The pt past weight encounters endorses his wt over a 4 year time span. Pt denies v/n/d, chewing, and swallowing difficulties. Pt LBM was 6/7, no stool consistency noted. Dietitian will continue to monitor.  Nutrition Discharge Planning: Cardiac; Low Na    Wt Readings from Last 20 Encounters:   06/08/23 " 75.8 kg (167 lb 1.7 oz)   08/06/19 95.3 kg (210 lb)   11/26/16 86.2 kg (190 lb)   08/26/16 83.9 kg (185 lb)   11/13/15 90.7 kg (200 lb)   10/01/13 97.5 kg (215 lb)   ]    Nutrition Risk Screen    Nutrition Risk Screen: no indicators present    Nutrition/Diet History    Typical Food/Fluid Intake: Microwaveable meals, Fresh fruits and vegetables  Spiritual, Cultural Beliefs, Restorationist Practices, Values that Affect Care: no  Food Allergies: NKFA  Factors Affecting Nutritional Intake: socio-economic    Anthropometrics    Temp: 98 °F (36.7 °C)  Height Method: Stated  Height: 6' (182.9 cm)  Height (inches): 72 in  Weight Method: Standard Scale  Weight: 75.8 kg (167 lb 1.7 oz)  Weight (lb): 167.11 lb  Ideal Body Weight (IBW), Male: 178 lb  % Ideal Body Weight, Male (lb): 93.88 %  BMI (Calculated): 22.7  BMI Grade: 18.5-24.9 - normal       Lab/Procedures/Meds  BMP  Lab Results   Component Value Date     06/08/2023    K 4.3 06/08/2023     06/08/2023    CO2 25 06/08/2023    BUN 13 06/08/2023    CREATININE 1.0 06/08/2023    CALCIUM 8.9 06/08/2023    ANIONGAP 7 (L) 06/08/2023    EGFRNORACEVR >60 06/08/2023       Pertinent Labs Reviewed: reviewed  Pertinent Medications Reviewed: reviewed  Scheduled Meds:   apixaban  5 mg Oral BID    aspirin  81 mg Oral Daily    atorvastatin  80 mg Oral Daily    LIDOcaine HCl 2%  15 mL Oral Once    losartan  25 mg Oral Daily    metoprolol succinate  25 mg Oral Daily    ticagrelor  90 mg Oral BID     Continuous Infusions:  PRN Meds:.acetaminophen, aluminum-magnesium hydroxide-simethicone, hydrALAZINE, ondansetron, ondansetron, promethazine (PHENERGAN) IVPB, traMADoL         Estimated/Assessed Needs    Weight Used For Calorie Calculations: 75.8 kg (167 lb 1.7 oz)  Energy Calorie Requirements (kcal): 2556-3829 (MSJ, AF 1.2-1.4)  Energy Need Method: Kolby Frey  Protein Requirements: 61-76 (0.8-1.0g/kg)  Weight Used For Protein Calculations: 75.8 kg (167 lb 1.7 oz)  Fluid Requirements  (mL): 9792-2560 (Per MD)     RDA Method (mL): 1933  CHO Requirement: 242-282      Nutrition Prescription Ordered    [facility]; Coumadin Restriction  (Diet/Nutrition)    Evaluation of Received Nutrient/Fluid Intake    I/O: +5377.1 Since Admit  Energy Calories Required: meeting needs  Protein Required: meeting needs  Fluid Required: exceeds needs  Total Fluid Intake (mL/kg): +5377.1 Since admit  Tolerance: tolerating  % Intake of Estimated Energy Needs: 75 - 100 %  % Meal Intake: 75 - 100 %    Nutrition Risk    Level of Risk/Frequency of Follow-up: low (x1 weekly)     Monitor and Evaluation    Food and Nutrient Intake: energy intake, food and beverage intake  Food and Nutrient Adminstration: diet order  Knowledge/Beliefs/Attitudes: food and nutrition knowledge/skill, beliefs and attitudes  Physical Activity and Function: nutrition-related ADLs and IADLs, factors affecting access to physical activity  Anthropometric Measurements: weight, weight change, body mass index  Biochemical Data, Medical Tests and Procedures: electrolyte and renal panel, gastrointestinal profile, glucose/endocrine profile, inflammatory profile, lipid profile  Nutrition-Focused Physical Findings: overall appearance, skin     Nutrition Follow-Up    RD Follow-up?: Yes  Andres Ni, Registration Eligible, Provisional LDN

## 2023-06-08 NOTE — PLAN OF CARE
Nutrition Recs: 6/8  1. Recommend pt continues Cardiac; Coumadin Restriction diet as medically appropriate.   2. Recommend to weigh pt daily.    Goals:   1. Pt will continue to consume % of energy needs by RD follow up.  2. Pt BP will improve to WNL by RD follow up  Nutrition Goal Status: new  Communication of RD Recs: other (comment) (POC: Sticky note)  Andres Ni, Registration Eligible, Provisional LDN

## 2023-06-08 NOTE — PLAN OF CARE
No changes to previous FN.     Pt to f/u with Cardiology outpatient for lifevest.     Sw discussed social security/disability with pt and steps to apply; pt voiced his understanding and stated he will look into this information.     No d/c needs.

## 2023-06-08 NOTE — PLAN OF CARE
Patient ready for discharge, IV and telemetry removed as ordered. Medications discussed in length with the patient, verbalized understanding. Discharge instructions reviewed with the patient, verbalized understanding. Waiting on 2 medications to be delivered to the bedside then patient will be ready for discharge.

## 2023-06-12 ENCOUNTER — TELEPHONE (OUTPATIENT)
Dept: CARDIOLOGY | Facility: HOSPITAL | Age: 58
End: 2023-06-12
Payer: MEDICAID

## 2023-06-15 ENCOUNTER — TELEPHONE (OUTPATIENT)
Dept: CARDIOLOGY | Facility: HOSPITAL | Age: 58
End: 2023-06-15
Payer: MEDICAID

## 2023-06-15 DIAGNOSIS — I50.43 ACUTE ON CHRONIC COMBINED SYSTOLIC AND DIASTOLIC CONGESTIVE HEART FAILURE: Primary | ICD-10-CM

## 2023-06-21 ENCOUNTER — OFFICE VISIT (OUTPATIENT)
Dept: CARDIOLOGY | Facility: CLINIC | Age: 58
End: 2023-06-21
Payer: MEDICAID

## 2023-06-21 VITALS
BODY MASS INDEX: 22.84 KG/M2 | SYSTOLIC BLOOD PRESSURE: 130 MMHG | WEIGHT: 168.44 LBS | OXYGEN SATURATION: 99 % | HEART RATE: 63 BPM | DIASTOLIC BLOOD PRESSURE: 80 MMHG

## 2023-06-21 DIAGNOSIS — I50.43 ACUTE ON CHRONIC COMBINED SYSTOLIC AND DIASTOLIC CONGESTIVE HEART FAILURE: Primary | ICD-10-CM

## 2023-06-21 PROCEDURE — 1111F DSCHRG MED/CURRENT MED MERGE: CPT | Mod: CPTII,,, | Performed by: PHYSICIAN ASSISTANT

## 2023-06-21 PROCEDURE — 3079F DIAST BP 80-89 MM HG: CPT | Mod: CPTII,,, | Performed by: PHYSICIAN ASSISTANT

## 2023-06-21 PROCEDURE — 3008F BODY MASS INDEX DOCD: CPT | Mod: CPTII,,, | Performed by: PHYSICIAN ASSISTANT

## 2023-06-21 PROCEDURE — 1159F PR MEDICATION LIST DOCUMENTED IN MEDICAL RECORD: ICD-10-PCS | Mod: CPTII,,, | Performed by: PHYSICIAN ASSISTANT

## 2023-06-21 PROCEDURE — 99204 PR OFFICE/OUTPT VISIT, NEW, LEVL IV, 45-59 MIN: ICD-10-PCS | Mod: S$PBB,,, | Performed by: PHYSICIAN ASSISTANT

## 2023-06-21 PROCEDURE — 1159F MED LIST DOCD IN RCRD: CPT | Mod: CPTII,,, | Performed by: PHYSICIAN ASSISTANT

## 2023-06-21 PROCEDURE — 3075F SYST BP GE 130 - 139MM HG: CPT | Mod: CPTII,,, | Performed by: PHYSICIAN ASSISTANT

## 2023-06-21 PROCEDURE — 1111F PR DISCHARGE MEDS RECONCILED W/ CURRENT OUTPATIENT MED LIST: ICD-10-PCS | Mod: CPTII,,, | Performed by: PHYSICIAN ASSISTANT

## 2023-06-21 PROCEDURE — 99204 OFFICE O/P NEW MOD 45 MIN: CPT | Mod: S$PBB,,, | Performed by: PHYSICIAN ASSISTANT

## 2023-06-21 PROCEDURE — 3075F PR MOST RECENT SYSTOLIC BLOOD PRESS GE 130-139MM HG: ICD-10-PCS | Mod: CPTII,,, | Performed by: PHYSICIAN ASSISTANT

## 2023-06-21 PROCEDURE — 3008F PR BODY MASS INDEX (BMI) DOCUMENTED: ICD-10-PCS | Mod: CPTII,,, | Performed by: PHYSICIAN ASSISTANT

## 2023-06-21 PROCEDURE — 1160F RVW MEDS BY RX/DR IN RCRD: CPT | Mod: CPTII,,, | Performed by: PHYSICIAN ASSISTANT

## 2023-06-21 PROCEDURE — 3079F PR MOST RECENT DIASTOLIC BLOOD PRESSURE 80-89 MM HG: ICD-10-PCS | Mod: CPTII,,, | Performed by: PHYSICIAN ASSISTANT

## 2023-06-21 PROCEDURE — 99999 PR PBB SHADOW E&M-EST. PATIENT-LVL IV: CPT | Mod: PBBFAC,,, | Performed by: PHYSICIAN ASSISTANT

## 2023-06-21 PROCEDURE — 99999 PR PBB SHADOW E&M-EST. PATIENT-LVL IV: ICD-10-PCS | Mod: PBBFAC,,, | Performed by: PHYSICIAN ASSISTANT

## 2023-06-21 PROCEDURE — 99214 OFFICE O/P EST MOD 30 MIN: CPT | Mod: PBBFAC | Performed by: PHYSICIAN ASSISTANT

## 2023-06-21 PROCEDURE — 4010F PR ACE/ARB THEARPY RXD/TAKEN: ICD-10-PCS | Mod: CPTII,,, | Performed by: PHYSICIAN ASSISTANT

## 2023-06-21 PROCEDURE — 4010F ACE/ARB THERAPY RXD/TAKEN: CPT | Mod: CPTII,,, | Performed by: PHYSICIAN ASSISTANT

## 2023-06-21 PROCEDURE — 1160F PR REVIEW ALL MEDS BY PRESCRIBER/CLIN PHARMACIST DOCUMENTED: ICD-10-PCS | Mod: CPTII,,, | Performed by: PHYSICIAN ASSISTANT

## 2023-06-21 RX ORDER — SACUBITRIL AND VALSARTAN 24; 26 MG/1; MG/1
1 TABLET, FILM COATED ORAL 2 TIMES DAILY
Qty: 60 TABLET | Refills: 1 | Status: SHIPPED | OUTPATIENT
Start: 2023-06-21

## 2023-06-22 PROCEDURE — 99285 EMERGENCY DEPT VISIT HI MDM: CPT | Mod: 25

## 2023-06-22 NOTE — PROGRESS NOTES
[facility] Provider Note (Initial Clinic) Consult Note    Date of original referral: 6/1/2023  Age: 57 y.o.  Gender: male  Ethnicity: Not  or /a   Number of admissions for CHF within the preceding year: 1   Duration of CHF: 2008 CAD   Type of Congestive Heart Failure: Systolic   Etiology: Ischemic   Social history: none  Enrolled in Infusion suite: no    Diagnostic Labs:   EKG - 06/07/2023  CXR - 06/02/2023  ECHO - 06/03/2023  Stress test -   Stress echo -   Pharmacologic stress -   Cardiac catheterization - 06/06/2023   Cardiac MRI -     Lab Results   Component Value Date     06/08/2023     (L) 06/07/2023    K 4.3 06/08/2023    K 3.7 06/07/2023     06/08/2023     06/07/2023    CO2 25 06/08/2023    CO2 26 06/07/2023    GLU 97 06/08/2023    GLU 98 06/07/2023    BUN 13 06/08/2023    BUN 14 06/07/2023    CREATININE 1.0 06/08/2023    CREATININE 1.0 06/07/2023    CALCIUM 8.9 06/08/2023    CALCIUM 8.5 (L) 06/07/2023    PROT 5.5 (L) 06/08/2023    PROT 5.1 (L) 06/07/2023    ALBUMIN 2.8 (L) 06/08/2023    ALBUMIN 2.6 (L) 06/07/2023    BILITOT 0.8 06/08/2023    BILITOT 0.8 06/07/2023    ALKPHOS 73 06/08/2023    ALKPHOS 92 06/07/2023    AST 29 06/08/2023    AST 30 06/07/2023    ALT 33 06/08/2023    ALT 31 06/07/2023    ANIONGAP 7 (L) 06/08/2023    ANIONGAP 7 (L) 06/07/2023    ESTGFRAFRICA >60 08/06/2019    ESTGFRAFRICA >60 08/26/2016    EGFRNONAA >60 08/06/2019    EGFRNONAA >60 08/26/2016       Lab Results   Component Value Date    WBC 3.90 06/07/2023    WBC 3.46 (L) 06/06/2023    RBC 3.14 (L) 06/07/2023    RBC 3.27 (L) 06/06/2023    HGB 10.8 (L) 06/07/2023    HGB 11.1 (L) 06/06/2023    HCT 30.3 (L) 06/07/2023    HCT 31.9 (L) 06/06/2023    MCV 97 06/07/2023    MCV 98 06/06/2023    MCH 34.4 (H) 06/07/2023    MCH 33.9 (H) 06/06/2023    MCHC 35.6 06/07/2023    MCHC 34.8 06/06/2023    RDW 11.9 06/07/2023    RDW 11.7 06/06/2023     (L) 06/07/2023     (L) 06/06/2023    MPV 9.5  06/07/2023    MPV 9.5 06/06/2023    IMMGR 0.3 06/07/2023    IMMGR 0.3 06/06/2023    IGABS 0.01 06/07/2023    IGABS 0.01 06/06/2023    LYMPH 1.0 06/07/2023    LYMPH 25.6 06/07/2023    MONO 0.6 06/07/2023    MONO 14.9 06/07/2023    EOS 0.0 06/07/2023    EOS 0.0 06/06/2023    BASO 0.03 06/07/2023    BASO 0.03 06/06/2023    NRBC 0 06/07/2023    NRBC 0 06/06/2023    GRAN 2.3 06/07/2023    GRAN 57.9 06/07/2023    EOSINOPHIL 0.5 06/07/2023    EOSINOPHIL 0.9 06/06/2023    BASOPHIL 0.8 06/07/2023    BASOPHIL 0.9 06/06/2023    PLTEST Appears normal 06/05/2023       Lab Results   Component Value Date    BNP 71 06/03/2023    MG 1.7 06/08/2023    MG 1.7 06/07/2023    NTPROBNP 211 (H) 08/11/2010    TROPONINI 0.119 (H) 06/03/2023    TROPONINI 0.127 (H) 06/03/2023       Lab Results   Component Value Date    CHOL 137 01/06/2009    TRIG 142 01/06/2009    HDL 25 (L) 01/06/2009    LDLCALC 83.6 01/06/2009    CHOLHDL 18.2 (L) 01/06/2009    TOTALCHOLEST 5.5 (H) 01/06/2009    COLORU [place] (A) 06/03/2023    APPEARANCEUA [place] (A) 06/03/2023    PHUR 5.0 06/03/2023    SPECGRAV >1.030 (A) 06/03/2023    PROTEINUA 2+ (A) 06/03/2023    GLUCUA Negative 06/03/2023    KETONESU 1+ (A) 06/03/2023    BILIRUBINUA 1+ (A) 06/03/2023    OCCULTUA 1+ (A) 06/03/2023    NITRITE Negative 06/03/2023    LEUKOCYTESUR Negative 06/03/2023       List all implanted cardiac devices:     Cardiomems: no    Current Outpatient Medications on File Prior to Visit   Medication Sig Dispense Refill    apixaban (ELIQUIS) 5 mg Tab Take 1 tablet (5 mg total) by mouth 2 (two) times daily. 60 tablet 0    aspirin (ECOTRIN) 81 MG EC tablet Take 1 tablet (81 mg total) by mouth once daily. for 14 days 14 tablet 0    atorvastatin (LIPITOR) 10 MG tablet Take 10 mg by mouth once daily.      metoprolol succinate (TOPROL-XL) 25 MG 24 hr tablet Take 1 tablet (25 mg total) by mouth once daily. 30 tablet 0    ticagrelor (BRILINTA) 90 mg tablet Take 1 tablet (90 mg total) by mouth 2 (two) times  "daily. 60 tablet 2    traMADol (ULTRAM) 50 mg tablet Take 1 tablet (50 mg total) by mouth every 6 (six) hours as needed for Pain. 12 tablet 0     No current facility-administered medications on file prior to visit.         HPI:  Patient can walk to clinic feels weak   Patient sleeps on 2 pillows   Patient wakes up SOB, has to get out of bed, associated cough, sputum none   Palpitations - none   Dizzy, light-headed, pre-syncope or syncope none   Since discharge frequency of performing weights, home weight and weight change   Other information felt pertinent to HPI    Michael Hopper is a 57 year old male with history of CAD and hypertension who presents to ED for further evaluation of nausea and vomiting that began three days ago. He came to the ED due dizziness and lightheadedness upon stand and "cloudy thinking". He admits to some generalized abdominal pain. His last BM was one week ago. He denies fever, chest pain, or palpitations. He denies opioid use.      In ED, CXR negative. Labs revealed elevated liver enzymes and elevated Total Bilirubin. Abdominal ultrasound and lipase are pending. Initial troponin 0.159, trending down 0.131. Case has been discussed with  Cardiology. Patient will be placed in observation under the care of hospital medicine.         Procedure(s) (LRB):  CATHETERIZATION, HEART, BOTH LEFT AND RIGHT (N/A)  Ultrasound-coronary (Left)  Angioplasty-coronary  Stent, Drug Eluting, Single Vessel, Coronary (Left)       Hospital Course:   Michael Hopper is a 57  year old male place on observation for elevated t.bilirubin. Abdominal US showed sludge filled gallbladder, no gallstones. No evidence of cholecystitis. No biliary ductal obstruction. Fatty liver. T. Bilirubin and liver enzymes improving with IVF. Cardiology consulted due to elevated troponin. ECHO showed left ventricle is mildly enlarged with severely decreased systolic function. Ejection fraction is 20%. There is grade I diastolic dysfunction " consistent with impaired relaxation. There is a small spherical solid possible thrombus. The thrombus is fixed and located in the apex. There are segmental wall motion abnormalities. He was started on heparin drip for ACS. Will need LHC once renal function improves. On 6/5/23, R/LHC on 6/6/23 with Dr. French. Heparin infusion and current plan of care continued. Hypokalemia noted with K added to IVF. Kidney function normalized. H/H stable upon repeat evaluation. T. Bili improved and LFTs normalized. Pt verbalized symptom improvement upon exam with N/V resolved. ASA, Statin, and Plavix continued- beta blocker held secondary to bradycardia- will initiate at lower dose as appropriate. Lifevest upon discharge discussed with patient per Cardiology. On 6/6/23, pt taken to Cath lab and LAD stent replaced per Cardiology. BP elevated with Lopressor resumed and Brilinta initiated. On 6/7/23, Case discussed with Cardiology and Lovenox bridge initiated and pharmacy consulted for Coumadin due to thrombus at apex. Life vest to be obtained outpatient. Discharge anticipated once INR trending upward and patient able to demonstrate administration of Lovenox injections. Vital signs stable. Plan of care discussed with patient per HM and Cardiology with understanding verbalized. On 6/8/23, patient transition to Eliquis for apical thrombus.  Case discussed with Cardiology and patient cleared for discharge with recommendations obtained.  Vital signs stable with no signs of acute distress witnessed or reported. Patient seen and examined in deemed medically stable for discharge to home.  Medications reconciled and aspirin, Brilinta, Eliquis, and Cozaar prescribed.  Patient instructed to maintain compliance with low-sodium diet, prescribed medications, and follow-up appointments with understanding verbalized.  Patient instructed to follow-up with PCP and Cardiology upon discharge further evaluation.    Since dc feeling ok, not SOB but feels  weak. Not working, was driving cabs at night    No PND, orthopnea      PHYSICAL:   Vitals:    06/21/23 1024   BP: 130/80   Pulse: 63      Wt Readings from Last 3 Encounters:   06/21/23 76.4 kg (168 lb 6.9 oz)   06/08/23 75.8 kg (167 lb 1.7 oz)   08/06/19 95.3 kg (210 lb)       JVD: no,    Heart rhythm: regular  Cardiac murmur: No    S3: no  S4: no  Lungs: clear  Liver span: 10 cm:   Hepatojugular reflux: no  Edema: no,       ASSESSMENT: acute systolic HF    PLAN:      Patient Instructions:   Instruct the patient to notify this clinic if HH, a physician or an advanced care provider wants to change medication one of their HF medications   Activity and Diet restrictions:   Recommend 2-3 gram sodium restriction and 1500cc- 2000cc fluid restriction.  Encourage physical activity with graded exercise program.  Requested patient to weigh themselves daily, and to notify us if their weight increases by more than 3 lbs in 1 day or 5 lbs in 3 days.    Assigned dry weight on home scale: 75 kg  Medication changes (include current dose and changed dose)  Stop ARB  Start entresto 24-26 BID  Awaiting life vest from [facility]  Echo in 3 months  HF education given   RTC 2 weeks with labs

## 2023-06-23 ENCOUNTER — HOSPITAL ENCOUNTER (EMERGENCY)
Facility: HOSPITAL | Age: 58
Discharge: HOME | End: 2023-06-23
Attending: EMERGENCY MEDICINE
Payer: MEDICAID

## 2023-06-23 VITALS
RESPIRATION RATE: 20 BRPM | DIASTOLIC BLOOD PRESSURE: 79 MMHG | HEART RATE: 71 BPM | OXYGEN SATURATION: 100 % | BODY MASS INDEX: 23.46 KG/M2 | TEMPERATURE: 97 F | SYSTOLIC BLOOD PRESSURE: 128 MMHG | HEIGHT: 71 IN | WEIGHT: 167.56 LBS

## 2023-06-23 DIAGNOSIS — R11.0 NAUSEA: Primary | ICD-10-CM

## 2023-06-23 DIAGNOSIS — R06.02 SHORTNESS OF BREATH: ICD-10-CM

## 2023-06-23 LAB
ALBUMIN SERPL BCP-MCNC: 3.6 G/DL (ref 3.5–5.2)
ALP SERPL-CCNC: 97 U/L (ref 55–135)
ALT SERPL W/O P-5'-P-CCNC: 36 U/L (ref 10–44)
ANION GAP SERPL CALC-SCNC: 12 MMOL/L (ref 8–16)
AST SERPL-CCNC: 50 U/L (ref 10–40)
BASOPHILS # BLD AUTO: 0.02 K/UL (ref 0–0.2)
BASOPHILS NFR BLD: 0.3 % (ref 0–1.9)
BILIRUB SERPL-MCNC: 1.3 MG/DL (ref 0.1–1)
BNP SERPL-MCNC: 181 PG/ML (ref 0–99)
BUN SERPL-MCNC: 10 MG/DL (ref 6–20)
CALCIUM SERPL-MCNC: 9.9 MG/DL (ref 8.7–10.5)
CHLORIDE SERPL-SCNC: 107 MMOL/L (ref 95–110)
CO2 SERPL-SCNC: 25 MMOL/L (ref 23–29)
CREAT SERPL-MCNC: 1 MG/DL (ref 0.5–1.4)
DIFFERENTIAL METHOD: ABNORMAL
EOSINOPHIL # BLD AUTO: 0 K/UL (ref 0–0.5)
EOSINOPHIL NFR BLD: 0.3 % (ref 0–8)
ERYTHROCYTE [DISTWIDTH] IN BLOOD BY AUTOMATED COUNT: 12.3 % (ref 11.5–14.5)
EST. GFR  (NO RACE VARIABLE): >60 ML/MIN/1.73 M^2
GLUCOSE SERPL-MCNC: 118 MG/DL (ref 70–110)
HCT VFR BLD AUTO: 39.5 % (ref 40–54)
HGB BLD-MCNC: 14 G/DL (ref 14–18)
IMM GRANULOCYTES # BLD AUTO: 0.01 K/UL (ref 0–0.04)
IMM GRANULOCYTES NFR BLD AUTO: 0.1 % (ref 0–0.5)
LIPASE SERPL-CCNC: 19 U/L (ref 4–60)
LYMPHOCYTES # BLD AUTO: 1.8 K/UL (ref 1–4.8)
LYMPHOCYTES NFR BLD: 25.6 % (ref 18–48)
MCH RBC QN AUTO: 34.4 PG (ref 27–31)
MCHC RBC AUTO-ENTMCNC: 35.4 G/DL (ref 32–36)
MCV RBC AUTO: 97 FL (ref 82–98)
MONOCYTES # BLD AUTO: 0.4 K/UL (ref 0.3–1)
MONOCYTES NFR BLD: 6.1 % (ref 4–15)
NEUTROPHILS # BLD AUTO: 4.7 K/UL (ref 1.8–7.7)
NEUTROPHILS NFR BLD: 67.6 % (ref 38–73)
NRBC BLD-RTO: 0 /100 WBC
PLATELET # BLD AUTO: 360 K/UL (ref 150–450)
PMV BLD AUTO: 8.7 FL (ref 9.2–12.9)
POTASSIUM SERPL-SCNC: 4.1 MMOL/L (ref 3.5–5.1)
PROT SERPL-MCNC: 7.2 G/DL (ref 6–8.4)
RBC # BLD AUTO: 4.07 M/UL (ref 4.6–6.2)
SODIUM SERPL-SCNC: 144 MMOL/L (ref 136–145)
TROPONIN I SERPL DL<=0.01 NG/ML-MCNC: 0.17 NG/ML (ref 0–0.03)
TROPONIN I SERPL DL<=0.01 NG/ML-MCNC: 0.17 NG/ML (ref 0–0.03)
WBC # BLD AUTO: 6.88 K/UL (ref 3.9–12.7)

## 2023-06-23 PROCEDURE — 83690 ASSAY OF LIPASE: CPT | Performed by: NURSE PRACTITIONER

## 2023-06-23 PROCEDURE — 83880 ASSAY OF NATRIURETIC PEPTIDE: CPT | Performed by: NURSE PRACTITIONER

## 2023-06-23 PROCEDURE — 93010 ELECTROCARDIOGRAM REPORT: CPT | Mod: ,,, | Performed by: INTERNAL MEDICINE

## 2023-06-23 PROCEDURE — 25000003 PHARM REV CODE 250: Performed by: EMERGENCY MEDICINE

## 2023-06-23 PROCEDURE — 93005 ELECTROCARDIOGRAM TRACING: CPT

## 2023-06-23 PROCEDURE — 80053 COMPREHEN METABOLIC PANEL: CPT | Performed by: NURSE PRACTITIONER

## 2023-06-23 PROCEDURE — 93010 EKG 12-LEAD: ICD-10-PCS | Mod: ,,, | Performed by: INTERNAL MEDICINE

## 2023-06-23 PROCEDURE — 85025 COMPLETE CBC W/AUTO DIFF WBC: CPT | Performed by: NURSE PRACTITIONER

## 2023-06-23 PROCEDURE — 84484 ASSAY OF TROPONIN QUANT: CPT | Mod: 91 | Performed by: NURSE PRACTITIONER

## 2023-06-23 PROCEDURE — 84484 ASSAY OF TROPONIN QUANT: CPT | Performed by: EMERGENCY MEDICINE

## 2023-06-23 RX ORDER — ONDANSETRON 4 MG/1
4 TABLET, FILM COATED ORAL EVERY 8 HOURS PRN
Qty: 12 TABLET | Refills: 0 | Status: SHIPPED | OUTPATIENT
Start: 2023-06-23

## 2023-06-23 RX ORDER — SUCRALFATE 1 G/10ML
1 SUSPENSION ORAL
Status: COMPLETED | OUTPATIENT
Start: 2023-06-23 | End: 2023-06-23

## 2023-06-23 RX ADMIN — SUCRALFATE 1 G: 1 SUSPENSION ORAL at 03:06

## 2023-06-23 NOTE — ED PROVIDER NOTES
"SCRIBE #1 NOTE: I, Adalberto Garcia, am scribing for, and in the presence of, Joanne Noble MD. I have scribed the entire note.       History     Chief Complaint   Patient presents with    Nausea     Nausea, vomiting, SOB, and right sided abd pain. States he was here for the same thing 3 weeks ago, worsening sx.      Review of patient's allergies indicates:   Allergen Reactions    Hydrocodone      "I get flustered and disoriented when I take hydrocodone"         History of Present Illness     HPI    6/23/2023, 1:27 AM  History obtained from the patient      History of Present Illness: Michael Hopper is a 57 y.o. male patient with a PMHx of appendectomy, CHF, HTN, CAD who presents to the Emergency Department for evaluation of n/v and SOB which gradually worsened 10 days ago. Pt states he was discharged from here 3 weeks ago for the same sxs and had no sxs for 5 days. He reports his SOB onset 10 days ago and has gradually worsened and his n/v onset yesterday. Symptoms are constant and moderate in severity. No mitigating or exacerbating factors reported. Patient denies any fever, CP, diarrhea, dysuria, weakness, and all other sxs at this time. Pt denies any changes in BM or flatulence. No further complaints or concerns at this time.       Arrival mode: Personal vehicle    PCP: Nkechi Madrid NP        Past Medical History:  Past Medical History:   Diagnosis Date    Coronary artery disease     Hypertension        Past Surgical History:  Past Surgical History:   Procedure Laterality Date    APPENDECTOMY      CARDIAC SURGERY      cardiac stents     CATHETERIZATION OF BOTH LEFT AND RIGHT HEART N/A 6/6/2023    Procedure: CATHETERIZATION, HEART, BOTH LEFT AND RIGHT;  Surgeon: Mary Kate French MD;  Location: [facility] CATH LAB;  Service: Cardiology;  Laterality: N/A;    INTRAVASCULAR ULTRASOUND, CORONARY Left 6/6/2023    Procedure: Ultrasound-coronary;  Surgeon: Mary Kate French MD;  Location: [facility] CATH LAB;  Service: Cardiology;  " Laterality: Left;    PERCUTANEOUS TRANSLUMINAL BALLOON ANGIOPLASTY OF CORONARY ARTERY  6/6/2023    Procedure: Angioplasty-coronary;  Surgeon: Mary Kate French MD;  Location: [facility] CATH LAB;  Service: Cardiology;;    STENT, DRUG ELUTING, SINGLE VESSEL, CORONARY Left 6/6/2023    Procedure: Stent, Drug Eluting, Single Vessel, Coronary;  Surgeon: Mary Kate French MD;  Location: [facility] CATH LAB;  Service: Cardiology;  Laterality: Left;         Family History:  No family history on file.    Social History:  Social History     Tobacco Use    Smoking status: Every Day    Smokeless tobacco: Not on file   Substance and Sexual Activity    Alcohol use: No    Drug use: Not on file    Sexual activity: Not on file        Review of Systems     Review of Systems   Constitutional:  Negative for fever.   HENT:  Negative for sore throat.    Respiratory:  Positive for shortness of breath.    Cardiovascular:  Negative for chest pain.   Gastrointestinal:  Positive for abdominal pain (RLQ), nausea and vomiting.   Genitourinary:  Negative for dysuria.   Musculoskeletal:  Negative for back pain.   Skin:  Negative for rash.   Neurological:  Negative for weakness.   Hematological:  Does not bruise/bleed easily.   All other systems reviewed and are negative.     Physical Exam     Initial Vitals [06/22/23 2306]   BP Pulse Resp Temp SpO2   138/72 96 20 97.4 °F (36.3 °C) 100 %      MAP       --          Physical Exam   Nursing Notes and Vital Signs Reviewed.  Constitutional: Patient is in no acute distress. Well-developed and well-nourished.  Head: Atraumatic. Normocephalic.  Eyes: PERRL. EOM intact. Conjunctivae are not pale. No scleral icterus.  ENT: Mucous membranes are moist. Oropharynx is clear and symmetric.    Neck: Supple. Full ROM. No lymphadenopathy.  Cardiovascular: Regular rate. Regular rhythm. No murmurs, rubs, or gallops. Distal pulses are 2+ and symmetric.  Pulmonary/Chest: No respiratory distress. Clear to auscultation bilaterally. No  "wheezing or rales.  Abdominal: Soft and non-distended.  There is no tenderness.  No rebound, guarding, or rigidity. Good bowel sounds.  Genitourinary: No CVA tenderness  Musculoskeletal: Moves all extremities. No obvious deformities. No edema. No calf tenderness.  Skin: Warm and dry.  Neurological:  Alert, awake, and appropriate.  Normal speech.  No acute focal neurological deficits are appreciated.  Psychiatric: Normal affect. Good eye contact. Appropriate in content.     ED Course   Procedures  ED Vital Signs:  Vitals:    06/22/23 2306 06/23/23 0100 06/23/23 0200   BP: 138/72 138/64 137/69   Pulse: 96 65 67   Resp: 20 20 19   Temp: 97.4 °F (36.3 °C)     TempSrc: Oral     SpO2: 100% 100% 100%   Weight: 76 kg (167 lb 8.8 oz)     Height: 5' 11" (1.803 m)         Abnormal Lab Results:  Labs Reviewed   B-TYPE NATRIURETIC PEPTIDE - Abnormal; Notable for the following components:       Result Value     (*)     All other components within normal limits   CBC W/ AUTO DIFFERENTIAL - Abnormal; Notable for the following components:    RBC 4.07 (*)     Hematocrit 39.5 (*)     MCH 34.4 (*)     MPV 8.7 (*)     All other components within normal limits   COMPREHENSIVE METABOLIC PANEL - Abnormal; Notable for the following components:    Glucose 118 (*)     Total Bilirubin 1.3 (*)     AST 50 (*)     All other components within normal limits   TROPONIN I - Abnormal; Notable for the following components:    Troponin I 0.165 (*)     All other components within normal limits   TROPONIN I - Abnormal; Notable for the following components:    Troponin I 0.168 (*)     All other components within normal limits   LIPASE   LIPASE        All Lab Results:  Results for orders placed or performed during the hospital encounter of 06/23/23   Brain natriuretic peptide   Result Value Ref Range     (H) 0 - 99 pg/mL   CBC auto differential   Result Value Ref Range    WBC 6.88 3.90 - 12.70 K/uL    RBC 4.07 (L) 4.60 - 6.20 M/uL    " Hemoglobin 14.0 14.0 - 18.0 g/dL    Hematocrit 39.5 (L) 40.0 - 54.0 %    MCV 97 82 - 98 fL    MCH 34.4 (H) 27.0 - 31.0 pg    MCHC 35.4 32.0 - 36.0 g/dL    RDW 12.3 11.5 - 14.5 %    Platelets 360 150 - 450 K/uL    MPV 8.7 (L) 9.2 - 12.9 fL    Immature Granulocytes 0.1 0.0 - 0.5 %    Gran # (ANC) 4.7 1.8 - 7.7 K/uL    Immature Grans (Abs) 0.01 0.00 - 0.04 K/uL    Lymph # 1.8 1.0 - 4.8 K/uL    Mono # 0.4 0.3 - 1.0 K/uL    Eos # 0.0 0.0 - 0.5 K/uL    Baso # 0.02 0.00 - 0.20 K/uL    nRBC 0 0 /100 WBC    Gran % 67.6 38.0 - 73.0 %    Lymph % 25.6 18.0 - 48.0 %    Mono % 6.1 4.0 - 15.0 %    Eosinophil % 0.3 0.0 - 8.0 %    Basophil % 0.3 0.0 - 1.9 %    Differential Method Automated    Comprehensive metabolic panel   Result Value Ref Range    Sodium 144 136 - 145 mmol/L    Potassium 4.1 3.5 - 5.1 mmol/L    Chloride 107 95 - 110 mmol/L    CO2 25 23 - 29 mmol/L    Glucose 118 (H) 70 - 110 mg/dL    BUN 10 6 - 20 mg/dL    Creatinine 1.0 0.5 - 1.4 mg/dL    Calcium 9.9 8.7 - 10.5 mg/dL    Total Protein 7.2 6.0 - 8.4 g/dL    Albumin 3.6 3.5 - 5.2 g/dL    Total Bilirubin 1.3 (H) 0.1 - 1.0 mg/dL    Alkaline Phosphatase 97 55 - 135 U/L    AST 50 (H) 10 - 40 U/L    ALT 36 10 - 44 U/L    eGFR >60 >60 mL/min/1.73 m^2    Anion Gap 12 8 - 16 mmol/L   Troponin I   Result Value Ref Range    Troponin I 0.165 (H) 0.000 - 0.026 ng/mL   Lipase   Result Value Ref Range    Lipase 19 4 - 60 U/L   Troponin I   Result Value Ref Range    Troponin I 0.168 (H) 0.000 - 0.026 ng/mL         Imaging Results:  Imaging Results              X-Ray Chest 1 View (Final result)  Result time 06/22/23 23:37:28      Final result by John Perez MD (06/22/23 23:37:28)                   Impression:      No acute abnormality.      Electronically signed by: John Perez  Date:    06/22/2023  Time:    23:37               Narrative:    EXAMINATION:  XR CHEST 1 VIEW    CLINICAL HISTORY:  Shortness of breath    TECHNIQUE:  Single frontal view of the chest was  performed.    COMPARISON:  None    FINDINGS:  The lungs are clear, with normal appearance of pulmonary vasculature and no pleural effusion or pneumothorax.    The cardiac silhouette is normal in size. The hilar and mediastinal contours are unremarkable.    Bones are intact.                                       The EKG was ordered, reviewed, and independently interpreted by the ED provider.  Interpretation time: 00:41  Rate: 69 BPM  Rhythm:  Sinus rhythm with occasional premature ventricular complexes.  Interpretation: Left anterior fascicular block. Septal infarct, age undetermined. ST and T wave abnormality, consider anterior ischemia. No STEMI.    The Emergency Provider reviewed the vital signs and test results, which are outlined above.     ED Discussion       5:12 AM: Reassessed pt at this time. Discussed with pt all pertinent ED information and results. Discussed pt dx and plan of tx. Gave pt all f/u and return to the ED instructions. All questions and concerns were addressed at this time. Pt expresses understanding of information and instructions, and is comfortable with plan to discharge. Pt is stable for discharge.    I discussed with patient and/or family/caretaker that evaluation in the ED does not suggest any emergent or life threatening medical conditions requiring immediate intervention beyond what was provided in the ED, and I believe patient is safe for discharge.  Regardless, an unremarkable evaluation in the ED does not preclude the development or presence of a serious of life threatening condition. As such, patient was instructed to return immediately for any worsening or change in current symptoms.         Medical Decision Making:   Clinical Tests:   Lab Tests: Ordered and Reviewed  Radiological Study: Ordered and Reviewed  Medical Tests: Ordered and Reviewed         ED Medication(s):  Medications   sucralfate 100 mg/mL suspension 1 g (1 g Oral Given 6/23/23 0340)       New Prescriptions     ONDANSETRON (ZOFRAN) 4 MG TABLET    Take 1 tablet (4 mg total) by mouth every 8 (eight) hours as needed for Nausea.        Follow-up Information       Nkechi Madrid NP In 3 days.    Specialty: Family Medicine  Contact information:  [address]  [place] [address] [phone]               [facility]    Specialty: Emergency Medicine  Why: If symptoms worsen, As needed  Contact information:  [address]  [facility] [address] [phone]                               Scribe Attestation:   Scribe #1: I performed the above scribed service and the documentation accurately describes the services I performed. I attest to the accuracy of the note.     Attending:   Physician Attestation Statement for Scribe #1: I, Joanne Noble MD, personally performed the services described in this documentation, as scribed by Adalberto Garcia, in my presence, and it is both accurate and complete.           Clinical Impression       ICD-10-CM ICD-9-CM   1. Nausea  R11.0 787.02   2. Shortness of breath  R06.02 786.05       Disposition:   Disposition: Discharged  Condition: Stable       Joanne Noble MD  06/23/23 0585

## 2023-07-05 ENCOUNTER — LAB VISIT (OUTPATIENT)
Dept: LAB | Facility: HOSPITAL | Age: 58
End: 2023-07-05
Attending: PHYSICIAN ASSISTANT
Payer: MEDICAID

## 2023-07-05 ENCOUNTER — OFFICE VISIT (OUTPATIENT)
Dept: CARDIOLOGY | Facility: CLINIC | Age: 58
End: 2023-07-05
Payer: MEDICAID

## 2023-07-05 VITALS
WEIGHT: 168.88 LBS | SYSTOLIC BLOOD PRESSURE: 114 MMHG | DIASTOLIC BLOOD PRESSURE: 60 MMHG | HEIGHT: 71 IN | HEART RATE: 60 BPM | BODY MASS INDEX: 23.64 KG/M2

## 2023-07-05 DIAGNOSIS — I50.43 ACUTE ON CHRONIC COMBINED SYSTOLIC AND DIASTOLIC CONGESTIVE HEART FAILURE: ICD-10-CM

## 2023-07-05 DIAGNOSIS — I50.43 ACUTE ON CHRONIC COMBINED SYSTOLIC AND DIASTOLIC CONGESTIVE HEART FAILURE: Primary | ICD-10-CM

## 2023-07-05 LAB
ANION GAP SERPL CALC-SCNC: 11 MMOL/L (ref 8–16)
BNP SERPL-MCNC: 204 PG/ML (ref 0–99)
BUN SERPL-MCNC: 9 MG/DL (ref 6–20)
CALCIUM SERPL-MCNC: 9.6 MG/DL (ref 8.7–10.5)
CHLORIDE SERPL-SCNC: 103 MMOL/L (ref 95–110)
CO2 SERPL-SCNC: 28 MMOL/L (ref 23–29)
CREAT SERPL-MCNC: 1 MG/DL (ref 0.5–1.4)
EST. GFR  (NO RACE VARIABLE): >60 ML/MIN/1.73 M^2
GLUCOSE SERPL-MCNC: 97 MG/DL (ref 70–110)
POTASSIUM SERPL-SCNC: 3.7 MMOL/L (ref 3.5–5.1)
SODIUM SERPL-SCNC: 142 MMOL/L (ref 136–145)

## 2023-07-05 PROCEDURE — 99999 PR PBB SHADOW E&M-EST. PATIENT-LVL III: ICD-10-PCS | Mod: PBBFAC,,, | Performed by: PHYSICIAN ASSISTANT

## 2023-07-05 PROCEDURE — 3008F BODY MASS INDEX DOCD: CPT | Mod: CPTII,,, | Performed by: PHYSICIAN ASSISTANT

## 2023-07-05 PROCEDURE — 3078F PR MOST RECENT DIASTOLIC BLOOD PRESSURE < 80 MM HG: ICD-10-PCS | Mod: CPTII,,, | Performed by: PHYSICIAN ASSISTANT

## 2023-07-05 PROCEDURE — 3008F PR BODY MASS INDEX (BMI) DOCUMENTED: ICD-10-PCS | Mod: CPTII,,, | Performed by: PHYSICIAN ASSISTANT

## 2023-07-05 PROCEDURE — 1111F PR DISCHARGE MEDS RECONCILED W/ CURRENT OUTPATIENT MED LIST: ICD-10-PCS | Mod: CPTII,,, | Performed by: PHYSICIAN ASSISTANT

## 2023-07-05 PROCEDURE — 1160F RVW MEDS BY RX/DR IN RCRD: CPT | Mod: CPTII,,, | Performed by: PHYSICIAN ASSISTANT

## 2023-07-05 PROCEDURE — 4010F ACE/ARB THERAPY RXD/TAKEN: CPT | Mod: CPTII,,, | Performed by: PHYSICIAN ASSISTANT

## 2023-07-05 PROCEDURE — 99213 OFFICE O/P EST LOW 20 MIN: CPT | Mod: PBBFAC | Performed by: PHYSICIAN ASSISTANT

## 2023-07-05 PROCEDURE — 83880 ASSAY OF NATRIURETIC PEPTIDE: CPT | Performed by: PHYSICIAN ASSISTANT

## 2023-07-05 PROCEDURE — 1160F PR REVIEW ALL MEDS BY PRESCRIBER/CLIN PHARMACIST DOCUMENTED: ICD-10-PCS | Mod: CPTII,,, | Performed by: PHYSICIAN ASSISTANT

## 2023-07-05 PROCEDURE — 80048 BASIC METABOLIC PNL TOTAL CA: CPT | Performed by: PHYSICIAN ASSISTANT

## 2023-07-05 PROCEDURE — 1111F DSCHRG MED/CURRENT MED MERGE: CPT | Mod: CPTII,,, | Performed by: PHYSICIAN ASSISTANT

## 2023-07-05 PROCEDURE — 99214 PR OFFICE/OUTPT VISIT, EST, LEVL IV, 30-39 MIN: ICD-10-PCS | Mod: S$PBB,,, | Performed by: PHYSICIAN ASSISTANT

## 2023-07-05 PROCEDURE — 1159F PR MEDICATION LIST DOCUMENTED IN MEDICAL RECORD: ICD-10-PCS | Mod: CPTII,,, | Performed by: PHYSICIAN ASSISTANT

## 2023-07-05 PROCEDURE — 99999 PR PBB SHADOW E&M-EST. PATIENT-LVL III: CPT | Mod: PBBFAC,,, | Performed by: PHYSICIAN ASSISTANT

## 2023-07-05 PROCEDURE — 3074F PR MOST RECENT SYSTOLIC BLOOD PRESSURE < 130 MM HG: ICD-10-PCS | Mod: CPTII,,, | Performed by: PHYSICIAN ASSISTANT

## 2023-07-05 PROCEDURE — 3078F DIAST BP <80 MM HG: CPT | Mod: CPTII,,, | Performed by: PHYSICIAN ASSISTANT

## 2023-07-05 PROCEDURE — 36415 COLL VENOUS BLD VENIPUNCTURE: CPT | Performed by: PHYSICIAN ASSISTANT

## 2023-07-05 PROCEDURE — 4010F PR ACE/ARB THEARPY RXD/TAKEN: ICD-10-PCS | Mod: CPTII,,, | Performed by: PHYSICIAN ASSISTANT

## 2023-07-05 PROCEDURE — 99214 OFFICE O/P EST MOD 30 MIN: CPT | Mod: S$PBB,,, | Performed by: PHYSICIAN ASSISTANT

## 2023-07-05 PROCEDURE — 3074F SYST BP LT 130 MM HG: CPT | Mod: CPTII,,, | Performed by: PHYSICIAN ASSISTANT

## 2023-07-05 PROCEDURE — 1159F MED LIST DOCD IN RCRD: CPT | Mod: CPTII,,, | Performed by: PHYSICIAN ASSISTANT

## 2023-07-05 NOTE — PROGRESS NOTES
[facility] Provider Note (Follow-up) Consult Note      HPI:  Patient can walk without SOB   Patient sleeps on 2 pillows   Patient wakes up SOB, has to get out of bed, associated cough, sputum none   Palpitations - none   Dizzy, light-headed, pre-syncope or syncope occasional LH   Since discharge frequency of performing weights, home weight and weight change same    Other information felt pertinent to HPI    Last visit started on entresto, feels better, less fatigued. No real SOB    Did have a dizzy spell on Monday, got up fast to answer door    No diuretics. Weight same, wants to start gaining, appetite back     PHYSICAL:   Vitals:    07/05/23 1054   BP: 114/60   Pulse: 60      Wt Readings from Last 3 Encounters:   07/05/23 76.6 kg (168 lb 14 oz)   06/22/23 76 kg (167 lb 8.8 oz)   06/21/23 76.4 kg (168 lb 6.9 oz)       JVD: no,    Heart rhythm: regular  Cardiac murmur: No    S3: no  S4: no  Lungs: clear  Liver span: 10 cm:   Hepatojugular reflux: no  Edema: no,       ASSESSMENT: acute systolic Hf    PLAN:      Patient Instructions:   Instruct the patient to notify this clinic if HH, a physician or an advanced care provider wants to change medication one of their HF medications   Activity and Diet restrictions:   Recommend 2-3 gram sodium restriction and 1500cc- 2000cc fluid restriction.  Encourage physical activity with graded exercise program.  Requested patient to weigh themselves daily, and to notify us if their weight increases by more than 3 lbs in 1 day or 5 lbs in 3 days.    Assigned dry weight on home scale: 76 kg  Medication changes (include current dose and changed dose)  Needs to stop smoking  Keep entresto at low dose BID  COntinue BB  Labs today  RTC 6 week echo in 2 months for AICD             good balance

## 2023-07-07 DIAGNOSIS — I25.118 CORONARY ARTERY DISEASE OF NATIVE ARTERY OF NATIVE HEART WITH STABLE ANGINA PECTORIS: ICD-10-CM

## 2023-07-07 DIAGNOSIS — I51.3 LEFT VENTRICULAR APICAL THROMBUS: Primary | ICD-10-CM

## 2023-08-16 ENCOUNTER — TELEPHONE (OUTPATIENT)
Dept: CARDIOLOGY | Facility: CLINIC | Age: 58
End: 2023-08-16
Payer: MEDICAID

## 2023-08-16 NOTE — TELEPHONE ENCOUNTER
I called pt to reschedule missed appt on 8/15 with Audelia FRYE. No answer. LVM for him to call back.

## 2023-08-23 ENCOUNTER — TELEPHONE (OUTPATIENT)
Dept: CARDIOLOGY | Facility: CLINIC | Age: 58
End: 2023-08-23
Payer: MEDICAID

## 2023-08-23 NOTE — TELEPHONE ENCOUNTER
----- Message from Maira Cabello LPN sent at 8/16/2023  2:49 PM CDT -----  See did he reschedule missed appt.

## 2023-08-23 NOTE — TELEPHONE ENCOUNTER
I called pt. No answer. Phone went straight to voicemail. Message left for pt to call back to reschedule missed appt with Audelia FRYE

## 2023-09-11 PROBLEM — I21.4 NSTEMI (NON-ST ELEVATED MYOCARDIAL INFARCTION): Status: RESOLVED | Noted: 2023-06-03 | Resolved: 2023-09-11

## (undated) DEVICE — CATH JL4 5FR

## (undated) DEVICE — WIRE X-SUP CHOICE PT .014X182

## (undated) DEVICE — ANGIOTOUCH KIT

## (undated) DEVICE — DRAPE ANGIO BRACH 38X44IN

## (undated) DEVICE — CATH REVOLUTION IVUS 45MHZ

## (undated) DEVICE — CATH CV QD LUMN 6FRX110CM

## (undated) DEVICE — GUIDEWIRE WHOLEY HI TORQ 175CM

## (undated) DEVICE — KIT MANIFOLD LOW PRESS TUBING

## (undated) DEVICE — CATH NC EMERGE MR 3.5X30MM

## (undated) DEVICE — CATH NC EMERGE MR 3.5X15MM

## (undated) DEVICE — KIT GLIDESHEATH SLEND 6FR 10CM

## (undated) DEVICE — OMNIPAQUE 300MG 150ML VIAL

## (undated) DEVICE — CATH ANGIOSCULPT EVO 3X15MM

## (undated) DEVICE — GUIDE LAUNCHER 6FR EBU 3.5

## (undated) DEVICE — PACK HEART CATH BR

## (undated) DEVICE — CATH INFINITI MULTIPAK JR4 5FR

## (undated) DEVICE — CATH JR4 5FR

## (undated) DEVICE — GUIDE WIRE J-TIP 260CM .025

## (undated) DEVICE — CATH PIG145 INFINITI 5X110CM

## (undated) DEVICE — KIT SYR REUSABLE

## (undated) DEVICE — SHEET THYROID W/ISO-BAC

## (undated) DEVICE — GUIDEWIRE EMERALD .035IN 260CM

## (undated) DEVICE — CATH INFINITI 4F 3DRC 100CM